# Patient Record
Sex: FEMALE | Race: WHITE | Employment: OTHER | URBAN - METROPOLITAN AREA
[De-identification: names, ages, dates, MRNs, and addresses within clinical notes are randomized per-mention and may not be internally consistent; named-entity substitution may affect disease eponyms.]

---

## 2017-05-09 PROBLEM — Z01.419 WELL WOMAN EXAM WITH ROUTINE GYNECOLOGICAL EXAM: Status: ACTIVE | Noted: 2017-05-09

## 2017-05-09 PROCEDURE — 81001 URINALYSIS AUTO W/SCOPE: CPT | Performed by: INTERNAL MEDICINE

## 2018-01-30 PROBLEM — Z87.442 HISTORY OF RENAL CALCULI: Status: ACTIVE | Noted: 2018-01-30

## 2018-01-30 PROBLEM — R31.9 HEMATURIA, UNSPECIFIED TYPE: Status: ACTIVE | Noted: 2018-01-30

## 2018-01-30 PROCEDURE — 81001 URINALYSIS AUTO W/SCOPE: CPT | Performed by: INTERNAL MEDICINE

## 2018-02-12 PROCEDURE — 87086 URINE CULTURE/COLONY COUNT: CPT | Performed by: INTERNAL MEDICINE

## 2018-06-25 PROBLEM — L30.9 DERMATITIS: Status: ACTIVE | Noted: 2018-06-25

## 2018-06-25 PROBLEM — Z00.00 ENCOUNTER FOR MEDICARE ANNUAL WELLNESS EXAM: Status: ACTIVE | Noted: 2018-06-25

## 2018-07-06 PROCEDURE — 81001 URINALYSIS AUTO W/SCOPE: CPT | Performed by: INTERNAL MEDICINE

## 2019-06-25 PROBLEM — Z00.00 ENCOUNTER FOR MEDICARE ANNUAL WELLNESS EXAM: Status: RESOLVED | Noted: 2018-06-25 | Resolved: 2019-06-25

## 2019-06-25 PROBLEM — R31.9 HEMATURIA, UNSPECIFIED TYPE: Status: RESOLVED | Noted: 2018-01-30 | Resolved: 2019-06-25

## 2019-06-25 PROBLEM — L30.9 DERMATITIS: Status: RESOLVED | Noted: 2018-06-25 | Resolved: 2019-06-25

## 2019-06-25 PROBLEM — R09.82 PND (POST-NASAL DRIP): Status: ACTIVE | Noted: 2019-06-25

## 2019-06-25 PROBLEM — J06.9 UPPER RESPIRATORY TRACT INFECTION, UNSPECIFIED TYPE: Status: ACTIVE | Noted: 2019-06-25

## 2019-06-25 PROCEDURE — 81001 URINALYSIS AUTO W/SCOPE: CPT | Performed by: INTERNAL MEDICINE

## 2020-01-23 PROBLEM — J06.9 UPPER RESPIRATORY TRACT INFECTION, UNSPECIFIED TYPE: Status: RESOLVED | Noted: 2019-06-25 | Resolved: 2020-01-23

## 2020-01-23 PROBLEM — Z78.0 POST-MENOPAUSE: Status: ACTIVE | Noted: 2020-01-23

## 2020-01-23 PROBLEM — R09.82 PND (POST-NASAL DRIP): Status: RESOLVED | Noted: 2019-06-25 | Resolved: 2020-01-23

## 2020-01-23 PROBLEM — E83.42 HYPOMAGNESEMIA: Status: ACTIVE | Noted: 2020-01-23

## 2020-06-05 PROBLEM — R73.9 HYPERGLYCEMIA: Status: ACTIVE | Noted: 2020-06-05

## 2020-06-05 PROBLEM — J22 LOWER RESP. TRACT INFECTION: Status: ACTIVE | Noted: 2020-06-05

## 2020-06-05 PROBLEM — R93.89 ABNORMAL CHEST CT: Status: ACTIVE | Noted: 2020-06-05

## 2020-06-05 PROBLEM — Z13.29 SCREENING FOR ENDOCRINE DISORDER: Status: ACTIVE | Noted: 2017-05-09

## 2020-06-13 PROBLEM — L98.9 SKIN DISORDER: Status: ACTIVE | Noted: 2020-06-13

## 2020-07-07 PROBLEM — I20.89 ANGINAL EQUIVALENT (HCC): Status: ACTIVE | Noted: 2020-07-07

## 2020-07-07 PROBLEM — E83.42 HYPOMAGNESEMIA: Status: RESOLVED | Noted: 2020-01-23 | Resolved: 2020-07-07

## 2020-07-07 PROBLEM — I20.89 ANGINAL EQUIVALENT: Status: ACTIVE | Noted: 2020-07-07

## 2020-07-07 PROBLEM — I20.8 ANGINAL EQUIVALENT (HCC): Status: ACTIVE | Noted: 2020-07-07

## 2020-07-07 PROBLEM — Z13.29 SCREENING FOR ENDOCRINE DISORDER: Status: RESOLVED | Noted: 2017-05-09 | Resolved: 2020-07-07

## 2020-07-07 PROBLEM — R73.01 IMPAIRED FASTING GLUCOSE: Status: ACTIVE | Noted: 2020-07-07

## 2020-07-07 PROBLEM — J22 LOWER RESP. TRACT INFECTION: Status: RESOLVED | Noted: 2020-06-05 | Resolved: 2020-07-07

## 2020-07-07 PROBLEM — R73.9 HYPERGLYCEMIA: Status: RESOLVED | Noted: 2020-06-05 | Resolved: 2020-07-07

## 2020-07-30 PROBLEM — I20.8 ANGINAL EQUIVALENT (HCC): Status: RESOLVED | Noted: 2020-07-07 | Resolved: 2020-07-30

## 2020-07-30 PROBLEM — Z01.818 PREOPERATIVE EXAMINATION: Status: ACTIVE | Noted: 2020-07-30

## 2020-07-30 PROBLEM — I20.89 ANGINAL EQUIVALENT (HCC): Status: RESOLVED | Noted: 2020-07-07 | Resolved: 2020-07-30

## 2020-07-30 PROBLEM — I20.89 ANGINAL EQUIVALENT: Status: RESOLVED | Noted: 2020-07-07 | Resolved: 2020-07-30

## 2021-01-05 PROBLEM — K58.9 IRRITABLE BOWEL SYNDROME, UNSPECIFIED TYPE: Status: ACTIVE | Noted: 2021-01-05

## 2021-02-11 PROCEDURE — 88305 TISSUE EXAM BY PATHOLOGIST: CPT | Performed by: INTERNAL MEDICINE

## 2021-05-05 PROBLEM — K58.8 OTHER IRRITABLE BOWEL SYNDROME: Status: ACTIVE | Noted: 2021-01-05

## 2021-09-21 PROBLEM — J06.9 UPPER RESPIRATORY TRACT INFECTION, UNSPECIFIED TYPE: Status: ACTIVE | Noted: 2021-09-21

## 2021-09-21 PROBLEM — Z01.419 WELL WOMAN EXAM WITH ROUTINE GYNECOLOGICAL EXAM: Status: ACTIVE | Noted: 2021-09-21

## 2022-01-19 PROBLEM — D89.89: Status: ACTIVE | Noted: 2022-01-19

## 2022-01-19 PROBLEM — L98.9 SKIN DISORDER: Status: RESOLVED | Noted: 2020-06-13 | Resolved: 2022-01-19

## 2022-01-19 PROBLEM — J06.9 UPPER RESPIRATORY TRACT INFECTION, UNSPECIFIED TYPE: Status: RESOLVED | Noted: 2021-09-21 | Resolved: 2022-01-19

## 2022-01-19 PROBLEM — J47.9 BRONCHIECTASIS WITHOUT COMPLICATION (HCC): Status: ACTIVE | Noted: 2022-01-19

## 2024-09-11 ENCOUNTER — APPOINTMENT (OUTPATIENT)
Dept: CT IMAGING | Age: 74
DRG: 381 | End: 2024-09-11
Attending: EMERGENCY MEDICINE
Payer: MEDICARE

## 2024-09-11 ENCOUNTER — HOSPITAL ENCOUNTER (INPATIENT)
Facility: HOSPITAL | Age: 74
LOS: 8 days | Discharge: HOME | End: 2024-09-19
Attending: EMERGENCY MEDICINE | Admitting: HOSPITALIST
Payer: MEDICARE

## 2024-09-11 ENCOUNTER — APPOINTMENT (OUTPATIENT)
Dept: CT IMAGING | Age: 74
End: 2024-09-11
Attending: EMERGENCY MEDICINE
Payer: MEDICARE

## 2024-09-11 ENCOUNTER — HOSPITAL ENCOUNTER (INPATIENT)
Facility: HOSPITAL | Age: 74
LOS: 8 days | Discharge: HOME | DRG: 381 | End: 2024-09-19
Attending: EMERGENCY MEDICINE | Admitting: HOSPITALIST
Payer: MEDICARE

## 2024-09-11 DIAGNOSIS — K25.1 ACUTE GASTRIC ULCER WITH PERFORATION (HCC): Primary | ICD-10-CM

## 2024-09-11 LAB
ALBUMIN SERPL-MCNC: 3.4 G/DL (ref 3.4–5)
ALBUMIN/GLOB SERPL: 0.7 {RATIO} (ref 1–2)
ALP LIVER SERPL-CCNC: 64 U/L
ALT SERPL-CCNC: 22 U/L
ANION GAP SERPL CALC-SCNC: 7 MMOL/L (ref 0–18)
AST SERPL-CCNC: 19 U/L (ref 15–37)
BASOPHILS # BLD AUTO: 0.04 X10(3) UL (ref 0–0.2)
BASOPHILS NFR BLD AUTO: 0.3 %
BILIRUB SERPL-MCNC: 0.8 MG/DL (ref 0.1–2)
BILIRUB UR QL CFM: POSITIVE
BUN BLD-MCNC: 37 MG/DL (ref 9–23)
CALCIUM BLD-MCNC: 9.7 MG/DL (ref 8.5–10.1)
CHLORIDE SERPL-SCNC: 96 MMOL/L (ref 98–112)
CLARITY UR REFRACT.AUTO: CLEAR
CO2 SERPL-SCNC: 29 MMOL/L (ref 21–32)
CREAT BLD-MCNC: 1.16 MG/DL
EGFRCR SERPLBLD CKD-EPI 2021: 49 ML/MIN/1.73M2 (ref 60–?)
EOSINOPHIL # BLD AUTO: 0.14 X10(3) UL (ref 0–0.7)
EOSINOPHIL NFR BLD AUTO: 0.9 %
ERYTHROCYTE [DISTWIDTH] IN BLOOD BY AUTOMATED COUNT: 13.7 %
GLOBULIN PLAS-MCNC: 4.8 G/DL (ref 2.8–4.4)
GLUCOSE BLD-MCNC: 109 MG/DL (ref 70–99)
GLUCOSE UR STRIP.AUTO-MCNC: NEGATIVE MG/DL
HCT VFR BLD AUTO: 48.9 %
HGB BLD-MCNC: 16.4 G/DL
IMM GRANULOCYTES # BLD AUTO: 0.06 X10(3) UL (ref 0–1)
IMM GRANULOCYTES NFR BLD: 0.4 %
LIPASE SERPL-CCNC: 41 U/L (ref 12–53)
LYMPHOCYTES # BLD AUTO: 2.25 X10(3) UL (ref 1–4)
LYMPHOCYTES NFR BLD AUTO: 15.2 %
MCH RBC QN AUTO: 30 PG (ref 26–34)
MCHC RBC AUTO-ENTMCNC: 33.5 G/DL (ref 31–37)
MCV RBC AUTO: 89.6 FL
MONOCYTES # BLD AUTO: 1.11 X10(3) UL (ref 0.1–1)
MONOCYTES NFR BLD AUTO: 7.5 %
NEUTROPHILS # BLD AUTO: 11.19 X10 (3) UL (ref 1.5–7.7)
NEUTROPHILS # BLD AUTO: 11.19 X10(3) UL (ref 1.5–7.7)
NEUTROPHILS NFR BLD AUTO: 75.7 %
NITRITE UR QL STRIP.AUTO: POSITIVE
OSMOLALITY SERPL CALC.SUM OF ELEC: 283 MOSM/KG (ref 275–295)
PH UR STRIP.AUTO: 5.5 [PH] (ref 5–8)
PLATELET # BLD AUTO: 572 10(3)UL (ref 150–450)
POTASSIUM SERPL-SCNC: 3.4 MMOL/L (ref 3.5–5.1)
PROT SERPL-MCNC: 8.2 G/DL (ref 6.4–8.2)
RBC # BLD AUTO: 5.46 X10(6)UL
RBC UR QL AUTO: NEGATIVE
SODIUM SERPL-SCNC: 132 MMOL/L (ref 136–145)
SP GR UR STRIP.AUTO: 1.02 (ref 1–1.03)
UROBILINOGEN UR STRIP.AUTO-MCNC: 1 MG/DL
WBC # BLD AUTO: 14.8 X10(3) UL (ref 4–11)

## 2024-09-11 PROCEDURE — 99497 ADVNCD CARE PLAN 30 MIN: CPT | Performed by: HOSPITALIST

## 2024-09-11 PROCEDURE — 74177 CT ABD & PELVIS W/CONTRAST: CPT | Performed by: EMERGENCY MEDICINE

## 2024-09-11 PROCEDURE — 99223 1ST HOSP IP/OBS HIGH 75: CPT | Performed by: HOSPITALIST

## 2024-09-11 PROCEDURE — 99222 1ST HOSP IP/OBS MODERATE 55: CPT | Performed by: STUDENT IN AN ORGANIZED HEALTH CARE EDUCATION/TRAINING PROGRAM

## 2024-09-11 RX ORDER — HYDROMORPHONE HYDROCHLORIDE 1 MG/ML
0.2 INJECTION, SOLUTION INTRAMUSCULAR; INTRAVENOUS; SUBCUTANEOUS EVERY 2 HOUR PRN
Status: DISCONTINUED | OUTPATIENT
Start: 2024-09-11 | End: 2024-09-19

## 2024-09-11 RX ORDER — METOCLOPRAMIDE HYDROCHLORIDE 5 MG/ML
5 INJECTION INTRAMUSCULAR; INTRAVENOUS EVERY 8 HOURS PRN
Status: DISCONTINUED | OUTPATIENT
Start: 2024-09-11 | End: 2024-09-19

## 2024-09-11 RX ORDER — LISINOPRIL 10 MG/1
10 TABLET ORAL DAILY
COMMUNITY

## 2024-09-11 RX ORDER — HYDROMORPHONE HYDROCHLORIDE 1 MG/ML
0.1 INJECTION, SOLUTION INTRAMUSCULAR; INTRAVENOUS; SUBCUTANEOUS EVERY 2 HOUR PRN
Status: DISCONTINUED | OUTPATIENT
Start: 2024-09-11 | End: 2024-09-19

## 2024-09-11 RX ORDER — HYDRALAZINE HYDROCHLORIDE 20 MG/ML
10 INJECTION INTRAMUSCULAR; INTRAVENOUS EVERY 6 HOURS PRN
Status: DISCONTINUED | OUTPATIENT
Start: 2024-09-11 | End: 2024-09-19

## 2024-09-11 RX ORDER — SODIUM CHLORIDE 9 MG/ML
INJECTION, SOLUTION INTRAVENOUS CONTINUOUS
Status: DISCONTINUED | OUTPATIENT
Start: 2024-09-11 | End: 2024-09-18

## 2024-09-11 RX ORDER — ONDANSETRON 2 MG/ML
4 INJECTION INTRAMUSCULAR; INTRAVENOUS ONCE
Status: COMPLETED | OUTPATIENT
Start: 2024-09-11 | End: 2024-09-11

## 2024-09-11 RX ORDER — MELATONIN
3 NIGHTLY PRN
Status: DISCONTINUED | OUTPATIENT
Start: 2024-09-11 | End: 2024-09-19

## 2024-09-11 RX ORDER — HEPARIN SODIUM 5000 [USP'U]/ML
5000 INJECTION, SOLUTION INTRAVENOUS; SUBCUTANEOUS EVERY 12 HOURS SCHEDULED
Status: DISCONTINUED | OUTPATIENT
Start: 2024-09-11 | End: 2024-09-19

## 2024-09-11 RX ORDER — ACETAMINOPHEN 500 MG
500 TABLET ORAL EVERY 4 HOURS PRN
Status: DISCONTINUED | OUTPATIENT
Start: 2024-09-11 | End: 2024-09-19

## 2024-09-11 RX ORDER — SODIUM PHOSPHATE, DIBASIC AND SODIUM PHOSPHATE, MONOBASIC 7; 19 G/230ML; G/230ML
1 ENEMA RECTAL ONCE AS NEEDED
Status: DISCONTINUED | OUTPATIENT
Start: 2024-09-11 | End: 2024-09-19

## 2024-09-11 RX ORDER — SODIUM CHLORIDE 9 MG/ML
INJECTION, SOLUTION INTRAVENOUS ONCE
Status: DISCONTINUED | OUTPATIENT
Start: 2024-09-11 | End: 2024-09-14

## 2024-09-11 RX ORDER — BISACODYL 10 MG
10 SUPPOSITORY, RECTAL RECTAL
Status: DISCONTINUED | OUTPATIENT
Start: 2024-09-11 | End: 2024-09-14

## 2024-09-11 RX ORDER — SODIUM CHLORIDE 9 MG/ML
INJECTION, SOLUTION INTRAVENOUS CONTINUOUS
Status: DISCONTINUED | OUTPATIENT
Start: 2024-09-11 | End: 2024-09-13

## 2024-09-11 RX ORDER — HYDROMORPHONE HYDROCHLORIDE 1 MG/ML
0.4 INJECTION, SOLUTION INTRAMUSCULAR; INTRAVENOUS; SUBCUTANEOUS EVERY 2 HOUR PRN
Status: DISCONTINUED | OUTPATIENT
Start: 2024-09-11 | End: 2024-09-19

## 2024-09-11 RX ORDER — METRONIDAZOLE 500 MG/100ML
500 INJECTION, SOLUTION INTRAVENOUS ONCE
Status: COMPLETED | OUTPATIENT
Start: 2024-09-11 | End: 2024-09-11

## 2024-09-11 RX ORDER — SENNOSIDES 8.6 MG
17.2 TABLET ORAL NIGHTLY PRN
Status: DISCONTINUED | OUTPATIENT
Start: 2024-09-11 | End: 2024-09-19

## 2024-09-11 RX ORDER — ALBUTEROL SULFATE 90 UG/1
2 INHALANT RESPIRATORY (INHALATION) 4 TIMES DAILY
Status: DISCONTINUED | OUTPATIENT
Start: 2024-09-11 | End: 2024-09-19

## 2024-09-11 RX ORDER — POLYETHYLENE GLYCOL 3350 17 G/17G
17 POWDER, FOR SOLUTION ORAL DAILY PRN
Status: DISCONTINUED | OUTPATIENT
Start: 2024-09-11 | End: 2024-09-19

## 2024-09-11 RX ORDER — ONDANSETRON 2 MG/ML
4 INJECTION INTRAMUSCULAR; INTRAVENOUS EVERY 6 HOURS PRN
Status: DISCONTINUED | OUTPATIENT
Start: 2024-09-11 | End: 2024-09-19

## 2024-09-11 NOTE — ED INITIAL ASSESSMENT (HPI)
States intermittent abd pain x1 month with vomiting states this episode started Monday - denies diarrhea

## 2024-09-11 NOTE — ED QUICK NOTES
Orders for admission, patient is aware of plan and ready to go upstairs. Any questions, please call ED RN merly at extension [phone].     Patient Covid vaccination status: Fully vaccinated     COVID Test Ordered in ED: None    COVID Suspicion at Admission: N/A    Running Infusions:    sodium chloride 125 mL (09/11/24 1622)        Mental Status/LOC at time of transport: A&Ox3    Other pertinent information:   CIWA score: N/A   NIH score:  N/A

## 2024-09-11 NOTE — H&P
[facility] [facility] [facility]  History and Physical     Yudy Lama Patient Status:  Emergency    1950 MRN [id number]   Location [facility] [facility] Attending Aj Soria MD   Hosp Day # 0 PCP Robert Kraus MD     Chief Complaint: \"Abdominal pain for a month with associated emesis\"    Subjective:    History of Present Illness:     Yudy Lama is a 74 year old female with PMHx essential hypertension, hyperlipidemia, GERD presents to hospital today with abdominal pain for about a month. Pt first noticed some intermittent abd x1 month. Had some emesis over last 24h. No fevers or chills. Pt decided to come in for further evaluation.     -S/p IV ceftriaxone, IV metronidazole, IV Zofran, IV Protonix, IV fluid bolus, general surgery consulted in the emergency    History/Other:    Past Medical History:  Past Medical History:    Atherosclerosis of coronary artery    Description: mild, medical therapy.     Atrophic vaginitis    Bundle branch block    Calculus of gallbladder without cholecystitis without obstruction    Chronic venous insufficiency    Cyst of ovary    Diverticulosis of colon    Left side    Eczema    Gastroesophageal reflux disease with esophagitis    Hx of colonic polyps    Hyperlipidemia, mixed    Hypertension, essential    Mild intermittent asthma without complication (HCC)    Primary generalized (osteo)arthritis    Ventral hernia without obstruction or gangrene    Repaired 2020     Past Surgical History:   Past Surgical History:   Procedure Laterality Date      1980    Colonoscopy  2015    Diverticulosis    Egd N/A 2021    Procedure: ESOPHAGOGASTRODUODENOSCOPY, COLONOSCOPY, POSSIBLE BIOPSY, POSSIBLE POLYPECTOMY 01127, 25083;  Surgeon: Sampson Damon MD;  Location: [facility]    Inguinal hernia repair  1968    Lithotripsy      Ventral hernia repair  2020    1.7 inch Ventralex ST hernia patch      Family History:   Family  History   Problem Relation Age of Onset    Heart Surgery Mother 63    Other (Other) Mother          at 75y/o;  AAA surgery.    Breast Cancer Maternal Grandmother 87        87    Heart Surgery Brother         Coronary stents in 40's; CABG in 50's    Other (Other) Brother         AAA repair     Social History:    reports that she has quit smoking. She has never used smokeless tobacco. She reports current alcohol use. She reports that she does not use drugs.     Allergies: No Known Allergies    Medications:    No current facility-administered medications on file prior to encounter.     Current Outpatient Medications on File Prior to Encounter   Medication Sig Dispense Refill    lisinopril 10 MG Oral Tab Take 1 tablet (10 mg total) by mouth daily.      albuterol (2.5 MG/3ML) 0.083% Inhalation Nebu Soln Take 3 mL (2.5 mg total) by nebulization every 6 (six) hours as needed for Wheezing. 1 each 5    chlorthalidone 25 MG Oral Tab Take 1 tablet (25 mg total) by mouth daily. 90 tablet 1    pantoprazole 40 MG Oral Tab EC Take 1 tablet (40 mg total) by mouth daily as needed. 90 tablet 1    rosuvastatin 5 MG Oral Tab Take 1 tablet (5 mg total) by mouth nightly. 90 tablet 1    fenofibrate 145 MG Oral Tab Take 1 tablet (145 mg total) by mouth once daily. 90 tablet 1    ADVAIR -21 MCG/ACT Inhalation Aerosol TAKE 2 PUFFS BY MOUTH TWICE A DAY **$492 3 each 2    Fluticasone-Salmeterol 113-14 MCG/ACT Inhalation Aerosol Powder, Breath Activated Inhale 1 puff into the lungs 2 (two) times daily. 3 each 3    Albuterol Sulfate HFA (VENTOLIN HFA) 108 (90 Base) MCG/ACT Inhalation Aero Soln Inhale 2 puffs into the lungs every 6 (six) hours as needed for Wheezing. 18 g 3    DICYCLOMINE HCL 10 MG Oral Cap TAKE 1 CAPSULE (10 MG TOTAL) BY MOUTH 4 (FOUR) TIMES DAILY BEFORE MEALS AND NIGHTLY. 120 capsule 2    Clobetasol Propionate 0.05 % External Ointment Apply twice daily to affected areas, reduce to using once, daily as the areas begin  to resolve. 60 g 5    OYSCO 500 500 MG Oral Tab TAKE 1 TABLET BY MOUTH 3 TIMES A DAY 90 tablet 0    Magnesium Oxide 140 MG Oral Cap 500 MG PO Q DAY 28 capsule 0    silver sulfADIAZINE 1 % External Cream Apply 1 Application topically 3 (three) times daily. 50 g 0    aspirin 325 MG Oral Tab Take 1 tablet (325 mg total) by mouth daily.      Respiratory Therapy Supplies (NEBULIZER) Does not apply Device One nebulizer 1 each 0    Quinapril HCl 10 MG Oral Tab Take 1 tablet (10 mg total) by mouth daily. (Patient not taking: Reported on 9/11/2024) 90 tablet 1    FLORASTOR 250 MG Oral Cap As directed (Patient not taking: Reported on 9/11/2024) 1 capsule 0       Review of Systems:   A comprehensive review of systems was completed.    Pertinent positives and negatives noted in the HPI.    Objective:   Physical Exam:    BP 98/48 (BP Location: Left arm)   Pulse 84   Temp 98.1 °F (36.7 °C) (Oral)   Resp 16   Ht 5' 5\" (1.651 m)   Wt 157 lb (71.2 kg)   LMP 01/01/1995   SpO2 100%   BMI 26.13 kg/m²   General: No acute distress, Alert  Respiratory: No rhonchi, no wheezes  Cardiovascular: S1, S2. Regular rate and rhythm  Abdomen: Soft, epigastric tender, non-distended, positive bowel sounds  Neuro: No new focal deficits  Extremities: No edema      Results:    Labs:      Labs Last 24 Hours:    Recent Labs   Lab 09/11/24  1355   RBC 5.46*   HGB 16.4*   HCT 48.9*   MCV 89.6   MCH 30.0   MCHC 33.5   RDW 13.7   NEPRELIM 11.19*   WBC 14.8*   .0*       Recent Labs   Lab 09/11/24  1355   *   BUN 37*   CREATSERUM 1.16*   EGFRCR 49*   CA 9.7   ALB 3.4   *   K 3.4*   CL 96*   CO2 29.0   ALKPHO 64   AST 19   ALT 22   BILT 0.8   TP 8.2       Lab Results   Component Value Date    INR 1.0 07/30/2020    INR 1 12/14/2011       No results for input(s): \"TROP\", \"TROPHS\", \"CK\" in the last 168 hours.    No results for input(s): \"TROP\", \"PBNP\" in the last 168 hours.    No results for input(s): \"PCT\" in the last 168  hours.    Imaging: Imaging data reviewed in Epic.    Assessment & Plan:      #Perforated gastric ulcer  #Acute vs chronic cholecystitis  -gen sx consulted from ER  -iv zosyn (9/11-)  -iv PPI BID  -pain meds, ivf    #Hyponatremia  #Acute kidney injury  #Dehydration  -ivf, repeat labs    #Pyuira, asymptomatic   -Ucx pending    #Thrombocytosis  -2/2 above    #Hypokalemia  -Replace and monitor     #CAD    #Essential hypertension  -PTA: Lisinopril  -PRN hydralazine for now    #Hyperlipidemia  -PTA: Rosuvastatin, fenofibrate    #GERD  -PTA: PPI    #Mild intermittent asthma  -prn albuterol     #ACP  -full code, 17 mins spent face to face w/ patient. We reviewed the meaning of cardiac arrest and the use of acls/cpr. This was a voluntary conversation. All questions were answered. Order updated on epic.         Plan of care discussed with er physician & patient / patient's  at the bedside    Sharonda Nunez DO    Supplementary Documentation:     The 21st Century Cures Act makes medical notes like these available to patients in the interest of transparency. Please be advised this is a medical document. Medical documents are intended to carry relevant information, facts as evident, and the clinical opinion of the practitioner. The medical note is intended as peer to peer communication and may appear blunt or direct. It is written in medical language and may contain abbreviations or verbiage that are unfamiliar.

## 2024-09-11 NOTE — ED PROVIDER NOTES
Patient Seen in: [facility] In [place]      History     Chief Complaint   Patient presents with    Abdomen/Flank Pain    Nausea/Vomiting/Diarrhea     Stated Complaint: states since Monday has had abd pain and vomiting, no diarrhea, no fever, sent *    Subjective:   HPI    74-year-old female with a past medical history as below including hypertension, hyperlipidemia, GERD, CAD and mild asthma presents with abdominal pain and vomiting that started 2 days ago.  Patient states abdominal pain is mainly in the epigastric and periumbilical area.  She reports multiple episodes of nonbloody/gray-colored emesis Monday afternoon and evening.  Patient is vomiting has gotten less frequent since then with last episode last night.  She still feels nauseated.   states she has had very little to eat and drink since Monday.  Patient states she had a normal bowel movement Monday.  Denies diarrhea, rectal bleeding or melena.  She denies fever, chills, cough or cold symptoms.  Denies urinary symptoms.    Objective:   Past Medical History:    Atherosclerosis of coronary artery    Description: mild, medical therapy.     Atrophic vaginitis    Bundle branch block    Calculus of gallbladder without cholecystitis without obstruction    Chronic venous insufficiency    Cyst of ovary    Diverticulosis of colon    Left side    Eczema    Gastroesophageal reflux disease with esophagitis    Hx of colonic polyps    Hyperlipidemia, mixed    Hypertension, essential    Mild intermittent asthma without complication (HCC)    Primary generalized (osteo)arthritis    Ventral hernia without obstruction or gangrene    Repaired 2020              Past Surgical History:   Procedure Laterality Date      1980    Colonoscopy  2015    Diverticulosis    Egd N/A 2021    Procedure: ESOPHAGOGASTRODUODENOSCOPY, COLONOSCOPY, POSSIBLE BIOPSY, POSSIBLE POLYPECTOMY 27480, 79879;  Surgeon: Sampson Damon MD;  Location:  [facility]    Inguinal hernia repair  06/1968    Lithotripsy  2002    Ventral hernia repair  08/12/2020    1.7 inch Ventralex ST hernia patch                Social History     Socioeconomic History    Marital status:    Tobacco Use    Smoking status: Former    Smokeless tobacco: Never   Vaping Use    Vaping status: Never Used   Substance and Sexual Activity    Alcohol use: Yes     Alcohol/week: 0.0 standard drinks of alcohol     Comment: ocassionally    Drug use: No              Review of Systems    Positive for stated Chief Complaint: Abdomen/Flank Pain and Nausea/Vomiting/Diarrhea    Other systems are as noted in HPI.  Constitutional and vital signs reviewed.      All other systems reviewed and negative except as noted above.    Physical Exam     ED Triage Vitals [09/11/24 1222]   /65   Pulse 100   Resp 20   Temp 98 °F (36.7 °C)   Temp src Temporal   SpO2 96 %   O2 Device None (Room air)       Current Vitals:   Vital Signs  BP: 119/38  Pulse: 79  Resp: 16  Temp: 97.7 °F (36.5 °C)  Temp src: Oral    Oxygen Therapy  SpO2: 95 %  O2 Device: None (Room air)            Physical Exam  Vitals and nursing note reviewed.   Constitutional:       Appearance: She is well-developed.   HENT:      Head: Normocephalic and atraumatic.      Mouth/Throat:      Mouth: Mucous membranes are moist.   Eyes:      General: No scleral icterus.  Cardiovascular:      Rate and Rhythm: Normal rate and regular rhythm.   Pulmonary:      Effort: Pulmonary effort is normal.      Breath sounds: Normal breath sounds.   Abdominal:      General: Bowel sounds are normal. There is no distension.      Palpations: Abdomen is soft.      Tenderness: There is abdominal tenderness. There is no guarding or rebound.      Comments: Diffuse tenderness across upper abdomen greatest in the epigastrium  Soft reducible supraumbilical hernia   Skin:     General: Skin is warm and dry.   Neurological:      General: No focal deficit present.      Mental  Status: She is alert and oriented to person, place, and time.      Cranial Nerves: No cranial nerve deficit.      Motor: No weakness.   Psychiatric:         Mood and Affect: Mood normal.         Behavior: Behavior normal.               ED Course     Labs Reviewed   COMP METABOLIC PANEL (14) - Abnormal; Notable for the following components:       Result Value    Glucose 109 (*)     Sodium 132 (*)     Potassium 3.4 (*)     Chloride 96 (*)     BUN 37 (*)     Creatinine 1.16 (*)     eGFR-Cr 49 (*)     Globulin  4.8 (*)     A/G Ratio 0.7 (*)     All other components within normal limits   CBC WITH DIFFERENTIAL WITH PLATELET - Abnormal; Notable for the following components:    WBC 14.8 (*)     RBC 5.46 (*)     HGB 16.4 (*)     HCT 48.9 (*)     .0 (*)     Neutrophil Absolute Prelim 11.19 (*)     Neutrophil Absolute 11.19 (*)     Monocyte Absolute 1.11 (*)     All other components within normal limits   URINALYSIS WITH CULTURE REFLEX - Abnormal; Notable for the following components:    Urine Color Patrizia (*)     Ketones Urine Trace (*)     Protein Urine 30 mg/dL (*)     Urobilinogen Urine 1.0 (*)     Nitrite Urine Positive (*)     Leukocyte Esterase Urine Trace (*)     All other components within normal limits   UA MICROSCOPIC ONLY, URINE - Abnormal; Notable for the following components:    WBC Urine 6-10 (*)     Bacteria Urine Rare (*)     Squamous Epi. Cells Few (*)     All other components within normal limits   ICTOTEST - Abnormal; Notable for the following components:    Ictotest Positive (*)     All other components within normal limits   LIPASE - Normal   URINE CULTURE, ROUTINE             CT ABDOMEN+PELVIS(CONTRAST ONLY)(CPT=74177)    Result Date: 9/11/2024  CONCLUSION:  1. Findings concerning for perforated gastric ulcer with small amount of free air in the right upper quadrant. 2.  Mild gallbladder wall thickening with cholelithiasis may be seen with chronic versus acute cholecystitis.  This report was  communicated by telephone to Dr. Soria at the dictation time shown below.   LOCATION:  [facility]   Dictated by (CST): Vinnie Bhatia MD on 9/11/2024 at 3:57 PM     Finalized by (CST): Vinnie Bhatia MD on 9/11/2024 at 4:03 PM             A total of 55 minutes of critical care time (exclusive of billable procedures) was administered to manage the patient's critical imaging findings due to her perforated gastric ulcer.  This involved direct patient intervention, complex decision making, and/or extensive discussions with the patient, family, and clinical staff.    MDM      74-year-old female with a past medical history as below including hypertension, hyperlipidemia, GERD, CAD and mild asthma presents with abdominal pain and vomiting that started 2 days ago.      Differential includes but is not limited to gastritis, PUD, pancreatitis, cholecystitis, dehydration, electrolyte abnormality less likely bowel obstruction    Labs show elevated WBC 14.8.  Patient also has some mild hyponatremia and hypokalemia with slightly elevated creatinine.  LFTs and lipase are normal.    Independent interpretation CT abdomen/pelvis shows some free air in the right upper quadrant.  Discussed with radiologist states that findings are concerning for perforated gastric ulcer.    Discussed with general surgery Dr. Arthur recommends sending patient to preop.  Patient treated with Protonix, Rocephin and Flagyl.  Discussed with hospitalist Dr. Goodrich.        Discussed with general surgery Dr. Conley states after his reviewing the CT the perforation appears contained.  Recommends admission but patient will likely not need surgical intervention today.      Admission disposition: 9/11/2024  4:28 PM                                        Medical Decision Making  Amount and/or Complexity of Data Reviewed  Independent Historian: spouse     Details: See HPI  Labs: ordered. Decision-making details documented in ED Course.  Radiology: ordered and  independent interpretation performed. Decision-making details documented in ED Course.  Discussion of management or test interpretation with external provider(s): General Surgery, hospitalist, radiology    Risk  Decision regarding hospitalization.        Disposition and Plan     Clinical Impression:  1. Acute gastric ulcer with perforation (HCC)         Disposition:  Admit  9/11/2024  4:28 pm    Follow-up:  No follow-up provider specified.        Medications Prescribed:  Current Discharge Medication List                            Hospital Problems       Present on Admission  Date Reviewed: 3/10/2022            ICD-10-CM Noted POA    * (Principal) Acute gastric ulcer with perforation (HCC) K25.1 9/11/2024 Unknown

## 2024-09-12 LAB
ALBUMIN SERPL-MCNC: 3.4 G/DL (ref 3.2–4.8)
ALBUMIN/GLOB SERPL: 1.1 {RATIO} (ref 1–2)
ALP LIVER SERPL-CCNC: 49 U/L
ALT SERPL-CCNC: 10 U/L
ANION GAP SERPL CALC-SCNC: 6 MMOL/L (ref 0–18)
AST SERPL-CCNC: 19 U/L (ref ?–34)
BASOPHILS # BLD AUTO: 0.05 X10(3) UL (ref 0–0.2)
BASOPHILS NFR BLD AUTO: 0.6 %
BILIRUB SERPL-MCNC: 0.6 MG/DL (ref 0.2–1.1)
BUN BLD-MCNC: 23 MG/DL (ref 9–23)
CALCIUM BLD-MCNC: 8.9 MG/DL (ref 8.7–10.4)
CHLORIDE SERPL-SCNC: 105 MMOL/L (ref 98–112)
CO2 SERPL-SCNC: 29 MMOL/L (ref 21–32)
CREAT BLD-MCNC: 0.81 MG/DL
EGFRCR SERPLBLD CKD-EPI 2021: 75 ML/MIN/1.73M2 (ref 60–?)
EOSINOPHIL # BLD AUTO: 0.22 X10(3) UL (ref 0–0.7)
EOSINOPHIL NFR BLD AUTO: 2.5 %
ERYTHROCYTE [DISTWIDTH] IN BLOOD BY AUTOMATED COUNT: 13.7 %
GLOBULIN PLAS-MCNC: 3.1 G/DL (ref 2–3.5)
GLUCOSE BLD-MCNC: 86 MG/DL (ref 70–99)
HCT VFR BLD AUTO: 39.5 %
HGB BLD-MCNC: 12.8 G/DL
IMM GRANULOCYTES # BLD AUTO: 0.03 X10(3) UL (ref 0–1)
IMM GRANULOCYTES NFR BLD: 0.3 %
LYMPHOCYTES # BLD AUTO: 1.24 X10(3) UL (ref 1–4)
LYMPHOCYTES NFR BLD AUTO: 14.4 %
MAGNESIUM SERPL-MCNC: 1.8 MG/DL (ref 1.6–2.6)
MCH RBC QN AUTO: 29.7 PG (ref 26–34)
MCHC RBC AUTO-ENTMCNC: 32.4 G/DL (ref 31–37)
MCV RBC AUTO: 91.6 FL
MONOCYTES # BLD AUTO: 0.82 X10(3) UL (ref 0.1–1)
MONOCYTES NFR BLD AUTO: 9.5 %
NEUTROPHILS # BLD AUTO: 6.27 X10 (3) UL (ref 1.5–7.7)
NEUTROPHILS # BLD AUTO: 6.27 X10(3) UL (ref 1.5–7.7)
NEUTROPHILS NFR BLD AUTO: 72.7 %
OSMOLALITY SERPL CALC.SUM OF ELEC: 293 MOSM/KG (ref 275–295)
PLATELET # BLD AUTO: 371 10(3)UL (ref 150–450)
POTASSIUM SERPL-SCNC: 3.8 MMOL/L (ref 3.5–5.1)
PROT SERPL-MCNC: 6.5 G/DL (ref 5.7–8.2)
RBC # BLD AUTO: 4.31 X10(6)UL
SODIUM SERPL-SCNC: 140 MMOL/L (ref 136–145)
WBC # BLD AUTO: 8.6 X10(3) UL (ref 4–11)

## 2024-09-12 PROCEDURE — 99232 SBSQ HOSP IP/OBS MODERATE 35: CPT | Performed by: STUDENT IN AN ORGANIZED HEALTH CARE EDUCATION/TRAINING PROGRAM

## 2024-09-12 PROCEDURE — 99232 SBSQ HOSP IP/OBS MODERATE 35: CPT | Performed by: HOSPITALIST

## 2024-09-12 RX ORDER — MAGNESIUM SULFATE HEPTAHYDRATE 40 MG/ML
2 INJECTION, SOLUTION INTRAVENOUS ONCE
Status: COMPLETED | OUTPATIENT
Start: 2024-09-12 | End: 2024-09-12

## 2024-09-12 NOTE — CONSULTS
[facility]  Report of Surgical Consultation with History and Physical Exam    Yudy Lama Patient Status:  Inpatient    1950 MRN [id number]   Location [facility] 3NW-A Attending Walter Goodrich MD   Hosp Day # 0 PCP Robert Kraus MD     Reason for Surgical Consultation: Seeing this patient in consultation at the request of Dr. Soria for perforated gastric ulcer    History of Present Illness:  Yudy Lama is a a(n) 74 year old female who presented to [place] emergency room after seeing her primary care physician today.  Patient reports having some abdominal bloating which led to severe abdominal pain and vomiting starting Monday.  Eventually, the vomiting improved but patient followed up with her primary care to be evaluated.  Due to the concern for decreased p.o. intake and severe dehydration, patient was recommended to go to the emergency room.    In [place] emergency room, patient was hemodynamically stable.  She had a leukocytosis to 14 and a mild DEBORAH at creatinine 1.16.  Labs were otherwise normal.  CT imaging was performed and showed small bubbles of free air near the liver and thickening of the distal stomach concerning for perforated gastric ulcer.  General surgery was consulted for further evaluation and management.    On interview, patient reports having a similar episode approximately 1 month ago.  She states that she became bloated and had severe pain leading to nausea and vomiting.  She was out of town at this time and did not go to the hospital.  Eventually, her symptoms subsided.  She reports having a similar episode nearly 5 years ago where she was in severe pain.  She was out of town again during this episode.  She reports having an endoscopy at that time which was negative.  Since being admitted to the hospital, patient reports improvement of her abdominal pain as well as nausea and vomiting.    She reports having a  as well as a ventral hernia repair  with mesh as her only abdominal surgeries.      History:  Past Medical History:    Atherosclerosis of coronary artery    Description: mild, medical therapy.     Atrophic vaginitis    Bundle branch block    Calculus of gallbladder without cholecystitis without obstruction    Chronic venous insufficiency    Cyst of ovary    Diverticulosis of colon    Left side    Eczema    Gastroesophageal reflux disease with esophagitis    Hx of colonic polyps    Hyperlipidemia, mixed    Hypertension, essential    Mild intermittent asthma without complication (HCC)    Primary generalized (osteo)arthritis    Ventral hernia without obstruction or gangrene    Repaired 2020       Past Surgical History:   Procedure Laterality Date      1980    Colonoscopy  2015    Diverticulosis    Egd N/A 2021    Procedure: ESOPHAGOGASTRODUODENOSCOPY, COLONOSCOPY, POSSIBLE BIOPSY, POSSIBLE POLYPECTOMY 99368, 52203;  Surgeon: Sampson Damon MD;  Location: [facility]    Inguinal hernia repair  1968    Lithotripsy      Ventral hernia repair  2020    1.7 inch Ventralex ST hernia patch       Family History   Problem Relation Age of Onset    Heart Surgery Mother 63    Other (Other) Mother          at 75y/o;  AAA surgery.    Breast Cancer Maternal Grandmother 87        87    Heart Surgery Brother         Coronary stents in 40's; CABG in 50's    Other (Other) Brother         AAA repair        reports that she has quit smoking. She has never used smokeless tobacco. She reports current alcohol use. She reports that she does not use drugs.      Allergies:  No Known Allergies      Medications:    Current Facility-Administered Medications:     sodium chloride 0.9% infusion, , Intravenous, Continuous    sodium chloride 0.9% infusion, , Intravenous, Once    piperacillin-tazobactam (Zosyn) 3.375 g in dextrose 5% 100 mL IVPB-ADDV, 3.375 g, Intravenous, Q8H    pantoprazole (Protonix) 40 mg in sodium chloride 0.9% PF 10 mL  IV push, 40 mg, Intravenous, Q12H    HYDROmorphone (Dilaudid) 1 MG/ML injection 0.1 mg, 0.1 mg, Intravenous, Q2H PRN **OR** HYDROmorphone (Dilaudid) 1 MG/ML injection 0.2 mg, 0.2 mg, Intravenous, Q2H PRN **OR** HYDROmorphone (Dilaudid) 1 MG/ML injection 0.4 mg, 0.4 mg, Intravenous, Q2H PRN    sodium chloride 0.9% infusion, , Intravenous, Continuous    hydrALAzine (Apresoline) 20 mg/mL injection 10 mg, 10 mg, Intravenous, Q6H PRN    albuterol (Ventolin HFA) 108 (90 Base) MCG/ACT inhaler 2 puff, 2 puff, Inhalation, QID    acetaminophen (Tylenol Extra Strength) tab 500 mg, 500 mg, Oral, Q4H PRN    melatonin tab 3 mg, 3 mg, Oral, Nightly PRN    ondansetron (Zofran) 4 MG/2ML injection 4 mg, 4 mg, Intravenous, Q6H PRN    metoclopramide (Reglan) 5 mg/mL injection 5 mg, 5 mg, Intravenous, Q8H PRN    heparin (Porcine) 5000 UNIT/ML injection 5,000 Units, 5,000 Units, Subcutaneous, 2 times per day    polyethylene glycol (PEG 3350) (Miralax) 17 g oral packet 17 g, 17 g, Oral, Daily PRN    sennosides (Senokot) tab 17.2 mg, 17.2 mg, Oral, Nightly PRN    bisacodyl (Dulcolax) 10 MG rectal suppository 10 mg, 10 mg, Rectal, Daily PRN    fleet enema (Fleet) rectal enema 133 mL, 1 enema, Rectal, Once PRN      Review of Systems:  The review of systems was negative other than the above listed HPI and past medical history including the HEENT, Heart, Lungs, GI, , Neuro, Musculoskeletal, Hematologic, Endocrine and Psych.       Physical Exam:  Blood pressure 94/41, pulse 76, temperature 98.1 °F (36.7 °C), temperature source Oral, resp. rate 18, height 65\", weight 157 lb (71.2 kg), last menstrual period 01/01/1995, SpO2 100%, not currently breastfeeding.  General: Alert, orientated x3.  Cooperative.  No apparent distress.  HEENT: Exam is unremarkable.  Without scleral icterus.  Mucous membranes are moist. EOM are WNL.  Neck: No tenderness to palpitation.  Full range of motion to flexion and extension, lateral rotation and lateral flexion of  cervical spine.  No JVD. Supple.   Lungs: Bilateral chest rise. Good excursion of the diaphragms. No secondary use of accessory respiratory musculature.  Cardiac: Regular rate and rhythm. No murmur.  Abdomen: Soft, tender to palpation but no peritoneal or rebound, nondistended, reducible ventral hernia appreciated just superior to her umbilicus  Extremities:  No lower extremity edema noted.  Without clubbing or cyanosis.  2+ pulses x4  Skin: Normal texture and turgor.      Laboratory Data and Relevant Imaging:  Recent Labs   Lab 09/11/24  1355   RBC 5.46*   HGB 16.4*   HCT 48.9*   MCV 89.6   MCH 30.0   MCHC 33.5   RDW 13.7   NEPRELIM 11.19*   WBC 14.8*   .0*       Recent Labs   Lab 09/11/24  1355   *   BUN 37*   CREATSERUM 1.16*   CA 9.7   ALB 3.4   *   K 3.4*   CL 96*   CO2 29.0   ALKPHO 64   AST 19   ALT 22   BILT 0.8   TP 8.2         No results for input(s): \"PTP\", \"INR\", \"PTT\" in the last 168 hours.    Imaging  CT ABDOMEN+PELVIS(CONTRAST ONLY)(CPT=74177)    Result Date: 9/11/2024  CONCLUSION:  1. Findings concerning for perforated gastric ulcer with small amount of free air in the right upper quadrant. 2.  Mild gallbladder wall thickening with cholelithiasis may be seen with chronic versus acute cholecystitis.  This report was communicated by telephone to Dr. Soria at the dictation time shown below.   LOCATION:  [facility]   Dictated by (CST): Vinnie Bhatia MD on 9/11/2024 at 3:57 PM     Finalized by (CST): Vinnie Bhatia MD on 9/11/2024 at 4:03 PM          Impression/Plan:  Patient Active Problem List   Diagnosis    Essential hypertension    Mixed hyperlipidemia    Other hemorrhoids    Mild intermittent asthma without complication (HCC)    Colon cancer screening    Eczema    Medicare annual wellness visit, subsequent    Visit for screening mammogram    Fatty liver    Asymptomatic varicose veins of unspecified lower extremity    Calculus of gallbladder without cholecystitis without obstruction     Atherosclerosis of coronary artery    Gastroesophageal reflux disease without esophagitis    Benign hematuria    Atrophic vaginitis    Primary generalized (osteo)arthritis    Irreducible umbilical hernia    Chronic venous insufficiency    History of renal calculi    Post-menopause    Abnormal chest CT    Impaired fasting glucose    Diverticulosis of colon    Hx of colonic polyps    Other irritable bowel syndrome    Well woman exam with routine gynecological exam    Kappa light chain deposition disorder (HCC)    Bronchiectasis without complication (HCC)    Acute gastric ulcer with perforation (HCC)       74 year old female with contained perforation of gastric ulcer    CT images were reviewed personally by me  CT shows potentially some fluid collection near the anterior wall of the distal stomach/pylorus with small bubbles of free air noted around the liver  There is no gross pneumoperitoneum concerning for free perforation  No acute surgical intervention at this time  Recommend trial of conservative management as patient may have sealed her perforation  Recommend NG tube placement for gastric decompression  IV fluids for hydration  IV antibiotics  Monitor for leukocytosis and fevers  If patient continues to show signs of infection, she may need IR drainage of fluid collection on imaging    Surgery will continue to follow  Rest of care per primary    Thank you for letting me participate in the care of this patient    Selin Conley MD  9/11/2024  9:33 PM

## 2024-09-12 NOTE — PLAN OF CARE
Upon assessment pt A&Ox4.  Bps soft but remain stable.  Other VS wdl.  Nsr on tele.  Denies chest pain, sob.  Nausea noted after NG placement, improved w/antiemetic.  Abdomen soft, round, tender.  Passing gas. NG secured at 55cm, bile and now clear output. Voids freely in adequate amounts.  2 rn skin check performed; flattened red patches scattered throughout body.  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 Patches are intact, not open.  PT npo. Ivf and abx running per order.  Pt updated on poc. Call light within reach.

## 2024-09-12 NOTE — PROGRESS NOTES
[facility]  General Surgery Progress Note    Yudy SEBAS Kelby Patient Status:  Inpatient    1950 MRN [id number]   Location [facility] 3NW-A Attending Bradly Fuentes MD   Hosp Day # 1 PCP Robert Kraus MD     Subjective:  NG tube was placed with 300 cc out.  Patient reports some worsening of her abdominal pain and reports back pain today.  She still feels bloated.  She remains afebrile.  Blood pressure was soft in the high 90s overnight but is now in the 100s.    Objective/Physical Exam:      Intake/Output Summary (Last 24 hours) at 2024 0823  Last data filed at 2024 0807  Gross per 24 hour   Intake 907 ml   Output 800 ml   Net 107 ml       Vital Signs:  Blood pressure 101/48, pulse 60, temperature 97.9 °F (36.6 °C), temperature source Oral, resp. rate 16, height 65\", weight 157 lb (71.2 kg), last menstrual period 1995, SpO2 100%, not currently breastfeeding.    General: Alert, orientated x3.  Cooperative.  No apparent distress.  HEENT: Exam is unremarkable.  Without scleral icterus.  Mucous membranes are moist. Oropharynx is clear.  NG tube in place with clear output  Neck: No JVD. Supple.   Lungs: Non labored breathing, equal chest rise  Cardiac: Regular rate and rhythm. No murmur.  Abdomen:  Soft, non-distended, tender to palpation but no rebound or peritoneal signs  Extremities:  No lower extremity edema noted.  Without clubbing or cyanosis.  2+ pulses x4, motor and sensation grossly intact      Labs:  Lab Results   Component Value Date    WBC 8.6 2024    RBC 4.31 2024    HGB 12.8 2024    HCT 39.5 2024    MCV 91.6 2024    MCH 29.7 2024    MCHC 32.4 2024    RDW 13.7 2024    .0 2024     Lab Results   Component Value Date     2024    K 3.8 2024     2024    CO2 29.0 2024    BUN 23 2024    CREATSERUM 0.81 2024    GLU 86 2024    CA 8.9 2024    ALKPHO 49 2024     ALT 10 09/12/2024    AST 19 09/12/2024    BILT 0.6 09/12/2024    ALB 3.4 09/12/2024    TP 6.5 09/12/2024     Lab Results   Component Value Date    MG 1.8 09/12/2024       Images:  CT ABDOMEN+PELVIS(CONTRAST ONLY)(CPT=74177)    Result Date: 9/11/2024  CONCLUSION:  1. Findings concerning for perforated gastric ulcer with small amount of free air in the right upper quadrant. 2.  Mild gallbladder wall thickening with cholelithiasis may be seen with chronic versus acute cholecystitis.  This report was communicated by telephone to Dr. Soria at the dictation time shown below.   LOCATION:  [facility]   Dictated by (CST): Vinnie Bhatia MD on 9/11/2024 at 3:57 PM     Finalized by (CST): Vinnie Bhatia MD on 9/11/2024 at 4:03 PM        Assessment/Plan:  Patient Active Problem List   Diagnosis    Essential hypertension    Mixed hyperlipidemia    Other hemorrhoids    Mild intermittent asthma without complication (HCC)    Colon cancer screening    Eczema    Medicare annual wellness visit, subsequent    Visit for screening mammogram    Fatty liver    Asymptomatic varicose veins of unspecified lower extremity    Calculus of gallbladder without cholecystitis without obstruction    Atherosclerosis of coronary artery    Gastroesophageal reflux disease without esophagitis    Benign hematuria    Atrophic vaginitis    Primary generalized (osteo)arthritis    Irreducible umbilical hernia    Chronic venous insufficiency    History of renal calculi    Post-menopause    Abnormal chest CT    Impaired fasting glucose    Diverticulosis of colon    Hx of colonic polyps    Other irritable bowel syndrome    Well woman exam with routine gynecological exam    Kappa light chain deposition disorder (HCC)    Bronchiectasis without complication (HCC)    Acute gastric ulcer with perforation (HCC)       74 year old female with contained gastric ulcer perforation    The patient remains afebrile  Patient did have soft blood pressures in the high 90s but this  is up to the 100s this morning  Leukocytosis has resolved  On physical exam, patient remains tender but is not peritonitic  NG tube has been placed to decompress her stomach  No acute surgical invention at this time  Will continue conservative management with NG tube decompression  Serial abdominal exams  I discussed with the patient that she may need surgical intervention especially if she begins to decompensate with her hemodynamics or have worsening abdominal pain  Continue IV antibiotics  Surgery will continue to follow  Rest of care per primary    Selin Conley MD  [facility]  9/12/2024  8:23 AM

## 2024-09-12 NOTE — PROGRESS NOTES
A&Ox4. VSS. RA.   Telemetry: NSR  GI: Abdomen soft, nondistended. Passing gas.  Nausea controlled with PRN zofran  : Voids.  Pain controlled with PRN pain medications  Up with standby assist.  Drains: NGT to lis in R nare at 55cm  Diet: NPO  IVF running per order. IV zosyn  All appropriate safety measures in place. Patient updated on plan of care. All questions and concerns addressed.

## 2024-09-12 NOTE — PROGRESS NOTES
[facility]   part of [facility]     Hospitalist Progress Note     Yudy Lama Patient Status:  Inpatient    1950 MRN [id number]   Location [facility] 3NW-A Attending Bradly Fuentes MD   Hosp Day # 1 PCP Robert Kraus MD     Chief Complaint: abd pain    Subjective:     Patient w/ epigastric pain but improving. No RUQ pain     Objective:    Review of Systems:   A comprehensive review of systems was completed; pertinent positive and negatives stated in subjective.    Vital signs:  Temp:  [97.6 °F (36.4 °C)-98.2 °F (36.8 °C)] 97.9 °F (36.6 °C)  Pulse:  [60-84] 70  Resp:  [16-18] 16  BP: ()/(41-56) 111/45  SpO2:  [100 %] 100 %    Physical Exam:    General: No acute distress  Respiratory: No wheezes, no rhonchi  Cardiovascular: S1, S2, regular rate and rhythm  Abdomen: Soft, epigastric-tender, non-distended, positive bowel sounds  Neuro: No new focal deficits.   Extremities: No edema      Diagnostic Data:    Labs:  Recent Labs   Lab 24  1355 24  0525   WBC 14.8* 8.6   HGB 16.4* 12.8   MCV 89.6 91.6   .0* 371.0       Recent Labs   Lab 24  1355 24  0525   * 86   BUN 37* 23   CREATSERUM 1.16* 0.81   CA 9.7 8.9   ALB 3.4 3.4   * 140   K 3.4* 3.8   CL 96* 105   CO2 29.0 29.0   ALKPHO 64 49*   AST 19 19   ALT 22 10   BILT 0.8 0.6   TP 8.2 6.5       Estimated Creatinine Clearance: 53.2 mL/min (based on SCr of 0.81 mg/dL).    No results for input(s): \"TROP\", \"TROPHS\", \"CK\" in the last 168 hours.    No results for input(s): \"PTP\", \"INR\" in the last 168 hours.               Microbiology    Hospital Encounter on 24   1. Urine Culture, Routine     Status: None (Preliminary result)    Collection Time: 24  3:15 PM    Specimen: Urine, clean catch   Result Value Ref Range    Urine Culture No Growth at <18 hours N/A         Imaging: Reviewed in Epic.    Medications:    sodium chloride   Intravenous Once    piperacillin-tazobactam  3.375 g  Intravenous Q8H    pantoprazole  40 mg Intravenous Q12H    albuterol  2 puff Inhalation QID    heparin  5,000 Units Subcutaneous 2 times per day       Assessment & Plan:      #Perforated gastric ulcer  #Acute vs chronic cholecystitis - no RUQ pain, likely chronic  -gen sx consulted from ER  -iv zosyn (9/11-)  -iv PPI BID  -pain meds, ivf  -no urgent surgery  -Ngt to decompress     #Hyponatremia  #Acute kidney injury  #Dehydration  -ivf, repeat labs     #Pyuira, asymptomatic   -Ucx no growth. No abx needed     #Thrombocytosis  -2/2 above     #Hypokalemia  -Replace and monitor      #CAD     #Essential hypertension  -PTA: Lisinopril  -PRN hydralazine for now     #Hyperlipidemia  -PTA: Rosuvastatin, fenofibrate     #GERD  -PTA: PPI    #Mild intermittent asthma  -prn albuterol      #ACP  -full code, 17 mins spent face to face w/ patient. We reviewed the meaning of cardiac arrest and the use of acls/cpr. This was a voluntary conversation. All questions were answered. Order updated on epic.          Bradly Fuentes MD    Supplementary Documentation:     Quality:  DVT Mechanical Prophylaxis:   SCDs,    DVT Pharmacologic Prophylaxis   Medication    heparin (Porcine) 5000 UNIT/ML injection 5,000 Units                Code Status: Full Code  Rosales: No urinary catheter in place  Rosales Duration (in days):   Central line:    ROSA:     Discharge is dependent on: course  At this point Ms. Lama is expected to be discharge to: home    The 21st Century Cures Act makes medical notes like these available to patients in the interest of transparency. Please be advised this is a medical document. Medical documents are intended to carry relevant information, facts as evident, and the clinical opinion of the practitioner. The medical note is intended as peer to peer communication and may appear blunt or direct. It is written in medical language and may contain abbreviations or verbiage that are unfamiliar.               **Certification      PHYSICIAN Certification of Need for Inpatient Hospitalization - Initial Certification    Patient will require inpatient services that will reasonably be expected to span two midnight's based on the clinical documentation in H+P.   Based on patients current state of illness, I anticipate that, after discharge, patient will require TBD.

## 2024-09-12 NOTE — PROGRESS NOTES
NURSING ADMISSION NOTE      Patient admitted via Ambulance  Oriented to room.  Safety precautions initiated.  Bed in low position.  Call light in reach.  Alert and Oriented x 4 VSS  Afebrile.    Denies nausea or emesis..  Denies pain at this time.  IVF continues per  orders.  Resting calmly in bed. Family at bedside.  Surgery informed that patient has been admitted.

## 2024-09-13 LAB
ALBUMIN SERPL-MCNC: 3.4 G/DL (ref 3.2–4.8)
ALBUMIN/GLOB SERPL: 1.2 {RATIO} (ref 1–2)
ALP LIVER SERPL-CCNC: 48 U/L
ALT SERPL-CCNC: 10 U/L
ANION GAP SERPL CALC-SCNC: 9 MMOL/L (ref 0–18)
AST SERPL-CCNC: 18 U/L (ref ?–34)
BASOPHILS # BLD AUTO: 0.04 X10(3) UL (ref 0–0.2)
BASOPHILS NFR BLD AUTO: 0.8 %
BILIRUB SERPL-MCNC: 0.5 MG/DL (ref 0.2–1.1)
BUN BLD-MCNC: 14 MG/DL (ref 9–23)
CALCIUM BLD-MCNC: 8.8 MG/DL (ref 8.7–10.4)
CHLORIDE SERPL-SCNC: 106 MMOL/L (ref 98–112)
CO2 SERPL-SCNC: 27 MMOL/L (ref 21–32)
CREAT BLD-MCNC: 0.67 MG/DL
EGFRCR SERPLBLD CKD-EPI 2021: 91 ML/MIN/1.73M2 (ref 60–?)
EOSINOPHIL # BLD AUTO: 0.25 X10(3) UL (ref 0–0.7)
EOSINOPHIL NFR BLD AUTO: 4.9 %
ERYTHROCYTE [DISTWIDTH] IN BLOOD BY AUTOMATED COUNT: 13.5 %
GLOBULIN PLAS-MCNC: 2.8 G/DL (ref 2–3.5)
GLUCOSE BLD-MCNC: 72 MG/DL (ref 70–99)
HCT VFR BLD AUTO: 37.1 %
HGB BLD-MCNC: 12.3 G/DL
IMM GRANULOCYTES # BLD AUTO: 0.02 X10(3) UL (ref 0–1)
IMM GRANULOCYTES NFR BLD: 0.4 %
LYMPHOCYTES # BLD AUTO: 0.96 X10(3) UL (ref 1–4)
LYMPHOCYTES NFR BLD AUTO: 18.9 %
MAGNESIUM SERPL-MCNC: 1.9 MG/DL (ref 1.6–2.6)
MAGNESIUM SERPL-MCNC: 1.9 MG/DL (ref 1.6–2.6)
MCH RBC QN AUTO: 30.4 PG (ref 26–34)
MCHC RBC AUTO-ENTMCNC: 33.2 G/DL (ref 31–37)
MCV RBC AUTO: 91.8 FL
MONOCYTES # BLD AUTO: 0.45 X10(3) UL (ref 0.1–1)
MONOCYTES NFR BLD AUTO: 8.9 %
NEUTROPHILS # BLD AUTO: 3.36 X10 (3) UL (ref 1.5–7.7)
NEUTROPHILS # BLD AUTO: 3.36 X10(3) UL (ref 1.5–7.7)
NEUTROPHILS NFR BLD AUTO: 66.1 %
OSMOLALITY SERPL CALC.SUM OF ELEC: 293 MOSM/KG (ref 275–295)
PLATELET # BLD AUTO: 300 10(3)UL (ref 150–450)
POTASSIUM SERPL-SCNC: 3.5 MMOL/L (ref 3.5–5.1)
PROT SERPL-MCNC: 6.2 G/DL (ref 5.7–8.2)
RBC # BLD AUTO: 4.04 X10(6)UL
SODIUM SERPL-SCNC: 142 MMOL/L (ref 136–145)
WBC # BLD AUTO: 5.1 X10(3) UL (ref 4–11)

## 2024-09-13 PROCEDURE — 99232 SBSQ HOSP IP/OBS MODERATE 35: CPT | Performed by: HOSPITALIST

## 2024-09-13 RX ORDER — DIPHENHYDRAMINE HCL 25 MG
25 CAPSULE ORAL EVERY 4 HOURS PRN
Status: DISCONTINUED | OUTPATIENT
Start: 2024-09-13 | End: 2024-09-19

## 2024-09-13 RX ORDER — DIPHENHYDRAMINE HYDROCHLORIDE 50 MG/ML
12.5 INJECTION INTRAMUSCULAR; INTRAVENOUS NIGHTLY PRN
Status: DISCONTINUED | OUTPATIENT
Start: 2024-09-13 | End: 2024-09-19

## 2024-09-13 NOTE — PAYOR COMM NOTE
--------------  ADMISSION REVIEW     Payor: [employer] MEDICARE ADV PPO  Subscriber #:  [id number]  Authorization Number: [id number]    Admit date: 9/11/24  Admit time:  6:40 PM       REVIEW DOCUMENTATION:     ED Provider Notes        ED Provider Notes signed by Aj Soria MD at 9/11/2024  5:12 PM       Author: Aj Soria MD Service: -- Author Type: Physician    Filed: 9/11/2024  5:12 PM Date of Service: 9/11/2024  2:00 PM Status: Signed    : Aj Soria MD (Physician)           Patient Seen in: [facility] [place]      History     Chief Complaint   Patient presents with    Abdomen/Flank Pain    Nausea/Vomiting/Diarrhea     Stated Complaint: states since Monday has had abd pain and vomiting, no diarrhea, no fever, sent *    Subjective:   HPI    74-year-old female with a past medical history as below including hypertension, hyperlipidemia, GERD, CAD and mild asthma presents with abdominal pain and vomiting that started 2 days ago.  Patient states abdominal pain is mainly in the epigastric and periumbilical area.  She reports multiple episodes of nonbloody/gray-colored emesis Monday afternoon and evening.  Patient is vomiting has gotten less frequent since then with last episode last night.  She still feels nauseated.   states she has had very little to eat and drink since Monday.  Patient states she had a normal bowel movement Monday.  Denies diarrhea, rectal bleeding or melena.  She denies fever, chills, cough or cold symptoms.  Denies urinary symptoms.    Objective:   Past Medical History:    Atherosclerosis of coronary artery    Description: mild, medical therapy.     Atrophic vaginitis    Bundle branch block    Calculus of gallbladder without cholecystitis without obstruction    Chronic venous insufficiency    Cyst of ovary    Diverticulosis of colon    Left side    Eczema    Gastroesophageal reflux disease with esophagitis    Hx of colonic polyps    Hyperlipidemia,  mixed    Hypertension, essential    Mild intermittent asthma without complication (HCC)    Primary generalized (osteo)arthritis    Ventral hernia without obstruction or gangrene    Repaired 2020              Past Surgical History:   Procedure Laterality Date      1980    Colonoscopy  2015    Diverticulosis    Egd N/A 2021    Procedure: ESOPHAGOGASTRODUODENOSCOPY, COLONOSCOPY, POSSIBLE BIOPSY, POSSIBLE POLYPECTOMY 69280, 24433;  Surgeon: Sampson Damon MD;  Location: [facility]    Inguinal hernia repair  1968    Lithotripsy      Ventral hernia repair  2020    1.7 inch Ventralex ST hernia patch                Social History     Socioeconomic History    Marital status:    Tobacco Use    Smoking status: Former    Smokeless tobacco: Never   Vaping Use    Vaping status: Never Used   Substance and Sexual Activity    Alcohol use: Yes     Alcohol/week: 0.0 standard drinks of alcohol     Comment: ocassionally    Drug use: No              Review of Systems    Positive for stated Chief Complaint: Abdomen/Flank Pain and Nausea/Vomiting/Diarrhea    Other systems are as noted in HPI.  Constitutional and vital signs reviewed.      All other systems reviewed and negative except as noted above.    Physical Exam     ED Triage Vitals [24 1222]   /65   Pulse 100   Resp 20   Temp 98 °F (36.7 °C)   Temp src Temporal   SpO2 96 %   O2 Device None (Room air)       Current Vitals:   Vital Signs  BP: 119/38  Pulse: 79  Resp: 16  Temp: 97.7 °F (36.5 °C)  Temp src: Oral    Oxygen Therapy  SpO2: 95 %  O2 Device: None (Room air)            Physical Exam  Vitals and nursing note reviewed.   Constitutional:       Appearance: She is well-developed.   HENT:      Head: Normocephalic and atraumatic.      Mouth/Throat:      Mouth: Mucous membranes are moist.   Eyes:      General: No scleral icterus.  Cardiovascular:      Rate and Rhythm: Normal rate and regular rhythm.   Pulmonary:       Effort: Pulmonary effort is normal.      Breath sounds: Normal breath sounds.   Abdominal:      General: Bowel sounds are normal. There is no distension.      Palpations: Abdomen is soft.      Tenderness: There is abdominal tenderness. There is no guarding or rebound.      Comments: Diffuse tenderness across upper abdomen greatest in the epigastrium  Soft reducible supraumbilical hernia   Skin:     General: Skin is warm and dry.   Neurological:      General: No focal deficit present.      Mental Status: She is alert and oriented to person, place, and time.      Cranial Nerves: No cranial nerve deficit.      Motor: No weakness.   Psychiatric:         Mood and Affect: Mood normal.         Behavior: Behavior normal.               ED Course     Labs Reviewed   COMP METABOLIC PANEL (14) - Abnormal; Notable for the following components:       Result Value    Glucose 109 (*)     Sodium 132 (*)     Potassium 3.4 (*)     Chloride 96 (*)     BUN 37 (*)     Creatinine 1.16 (*)     eGFR-Cr 49 (*)     Globulin  4.8 (*)     A/G Ratio 0.7 (*)     All other components within normal limits   CBC WITH DIFFERENTIAL WITH PLATELET - Abnormal; Notable for the following components:    WBC 14.8 (*)     RBC 5.46 (*)     HGB 16.4 (*)     HCT 48.9 (*)     .0 (*)     Neutrophil Absolute Prelim 11.19 (*)     Neutrophil Absolute 11.19 (*)     Monocyte Absolute 1.11 (*)     All other components within normal limits   URINALYSIS WITH CULTURE REFLEX - Abnormal; Notable for the following components:    Urine Color Patrizia (*)     Ketones Urine Trace (*)     Protein Urine 30 mg/dL (*)     Urobilinogen Urine 1.0 (*)     Nitrite Urine Positive (*)     Leukocyte Esterase Urine Trace (*)     All other components within normal limits   UA MICROSCOPIC ONLY, URINE - Abnormal; Notable for the following components:    WBC Urine 6-10 (*)     Bacteria Urine Rare (*)     Squamous Epi. Cells Few (*)     All other components within normal limits   ICTOTEST -  Abnormal; Notable for the following components:    Ictotest Positive (*)     All other components within normal limits   LIPASE - Normal   URINE CULTURE, ROUTINE             CT ABDOMEN+PELVIS(CONTRAST ONLY)(CPT=74177)    Result Date: 9/11/2024  CONCLUSION:  1. Findings concerning for perforated gastric ulcer with small amount of free air in the right upper quadrant. 2.  Mild gallbladder wall thickening with cholelithiasis may be seen with chronic versus acute cholecystitis.  This report was communicated by telephone to Dr. Soria at the dictation time shown below.   LOCATION:  [facility]   Dictated by (CST): Vinnie Bhatia MD on 9/11/2024 at 3:57 PM     Finalized by (CST): Vinnie Bhatia MD on 9/11/2024 at 4:03 PM            A total of 55 minutes of critical care time (exclusive of billable procedures) was administered to manage the patient's critical imaging findings due to her perforated gastric ulcer.  This involved direct patient intervention, complex decision making, and/or extensive discussions with the patient, family, and clinical staff.    MDM      74-year-old female with a past medical history as below including hypertension, hyperlipidemia, GERD, CAD and mild asthma presents with abdominal pain and vomiting that started 2 days ago.      Differential includes but is not limited to gastritis, PUD, pancreatitis, cholecystitis, dehydration, electrolyte abnormality less likely bowel obstruction    Labs show elevated WBC 14.8.  Patient also has some mild hyponatremia and hypokalemia with slightly elevated creatinine.  LFTs and lipase are normal.    Independent interpretation CT abdomen/pelvis shows some free air in the right upper quadrant.  Discussed with radiologist states that findings are concerning for perforated gastric ulcer.    Discussed with general surgery Dr. Arthur recommends sending patient to preop.  Patient treated with Protonix, Rocephin and Flagyl.  Discussed with hospitalist Dr. Goodrich.         Discussed with general surgery Dr. Conley states after his reviewing the CT the perforation appears contained.  Recommends admission but patient will likely not need surgical intervention today.      Admission disposition: 9/11/2024  4:28 PM                                        Medical Decision Making  Amount and/or Complexity of Data Reviewed  Independent Historian: spouse     Details: See HPI  Labs: ordered. Decision-making details documented in ED Course.  Radiology: ordered and independent interpretation performed. Decision-making details documented in ED Course.  Discussion of management or test interpretation with external provider(s): General Surgery, hospitalist, radiology    Risk  Decision regarding hospitalization.        Disposition and Plan     Clinical Impression:  1. Acute gastric ulcer with perforation (HCC)         Disposition:  Admit  9/11/2024  4:28 pm    Follow-up:  No follow-up provider specified.        Medications Prescribed:  Current Discharge Medication List                            Hospital Problems       Present on Admission  Date Reviewed: 3/10/2022            ICD-10-CM Noted POA    * (Principal) Acute gastric ulcer with perforation (HCC) K25.1 9/11/2024 Unknown                     Signed by Aj Soria MD on 9/11/2024  5:12 PM         MEDICATIONS ADMINISTERED IN LAST 1 DAY:  albuterol (Ventolin HFA) 108 (90 Base) MCG/ACT inhaler 2 puff       Date Action Dose Route User    9/13/2024 1242 Given 2 puff Inhalation Yolande Swanson RN    9/13/2024 0846 Given 2 puff Inhalation Yolande Swanson RN    9/12/2024 2006 Given 2 puff Inhalation Minerva Guerra RN          heparin (Porcine) 5000 UNIT/ML injection 5,000 Units       Date Action Dose Route User    9/13/2024 0846 Given 5,000 Units Subcutaneous (Left Lower Abdomen) Yolande Swanson RN    9/12/2024 2006 Given 5,000 Units Subcutaneous (Left Lower Abdomen) Minerva Guerra RN          HYDROmorphone (Dilaudid) 1 MG/ML injection 0.2 mg        Date Action Dose Route User    9/12/2024 2352 Given 0.2 mg Intravenous Minerva Guerra RN          ondansetron (Zofran) 4 MG/2ML injection 4 mg       Date Action Dose Route User    9/12/2024 1829 Given 4 mg Intravenous Yolande Swanson RN          pantoprazole (Protonix) 40 mg in sodium chloride 0.9% PF 10 mL IV push       Date Action Dose Route User    9/13/2024 0422 Given 40 mg Intravenous Minerva Guerra RN          piperacillin-tazobactam (Zosyn) 3.375 g in dextrose 5% 100 mL IVPB-ADDV       Date Action Dose Route User    9/13/2024 1242 New Bag 3.375 g Intravenous Yolande Swanson RN    9/13/2024 0422 New Bag 3.375 g Intravenous Minerva Guerra RN    9/12/2024 2006 New Bag 3.375 g Intravenous Minerva Guerra RN          sodium chloride 0.9% infusion       Date Action Dose Route User    9/13/2024 1206 Rate/Dose Change (none) Intravenous Yolande Swanson RN    9/13/2024 0421 New Bag (none) Intravenous Minerva Guerra RN    9/12/2024 1811 New Bag (none) Intravenous Leonarda Acosta RN            Vitals (last day)       Date/Time Temp Pulse Resp BP SpO2 Weight O2 Device O2 Flow Rate (L/min) [facility]    09/13/24 1249 -- -- -- 105/49 -- -- -- --     09/13/24 1247 -- -- -- 114/42 -- -- -- --     09/13/24 1245 -- -- -- 105/38 -- -- -- --     09/13/24 1220 97.7 °F (36.5 °C) 63 18 107/48 95 % -- None (Room air) --     09/13/24 0808 97.7 °F (36.5 °C) 57 18 103/41 97 % -- None (Room air) --     09/13/24 0422 98.1 °F (36.7 °C) 58 18 103/38 99 % -- None (Room air) --     09/12/24 2346 98.8 °F (37.1 °C) 61 18 103/42 98 % -- None (Room air) 0 L/min KS    09/12/24 2203 -- 78 -- -- -- -- -- -- KS    09/12/24 1914 98.4 °F (36.9 °C) 68 18 116/44 -- -- None (Room air) 0 L/min KS    09/12/24 1650 97.9 °F (36.6 °C) 70 16 111/45 100 % -- None (Room air) --     09/12/24 1322 97.6 °F (36.4 °C) 63 16 113/53 100 % -- None (Room air) --     09/12/24 0807 97.9 °F (36.6 °C) 60 16 101/48 100 % -- None (Room air) --     09/12/24  0409 97.9 °F (36.6 °C) 60 16 108/56 100 % -- None (Room air) -- AJ         H&P    Subjective:  History of Present Illness:      Yudy Lama is a 74 year old female with PMHx essential hypertension, hyperlipidemia, GERD presents to hospital today with abdominal pain for about a month. Pt first noticed some intermittent abd x1 month. Had some emesis over last 24h. No fevers or chills. Pt decided to come in for further evaluation.      -S/p IV ceftriaxone, IV metronidazole, IV Zofran, IV Protonix, IV fluid bolus, general surgery consulted in the emergency           Assessment & Plan:  #Perforated gastric ulcer  #Acute vs chronic cholecystitis  -gen sx consulted from ER  -iv zosyn (9/11-)  -iv PPI BID  -pain meds, ivf     #Hyponatremia  #Acute kidney injury  #Dehydration  -ivf, repeat labs     #Pyuira, asymptomatic   -Ucx pending     #Thrombocytosis  -2/2 above     #Hypokalemia  -Replace and monitor      #CAD     #Essential hypertension  -PTA: Lisinopril  -PRN hydralazine for now     #Hyperlipidemia  -PTA: Rosuvastatin, fenofibrate     #GERD  -PTA: PPI    #Mild intermittent asthma  -prn albuterol      #ACP  -full code,    9/11 GEN SURGERY CONSULT NOTE  Reason for Surgical Consultation: Seeing this patient in consultation at the request of Dr. Soria for perforated gastric ulcer     History of Present Illness:  Yudy Lama is a a(n) 74 year old female who presented to [facility] emergency room after seeing her primary care physician today.  Patient reports having some abdominal bloating which led to severe abdominal pain and vomiting starting Monday.  Eventually, the vomiting improved but patient followed up with her primary care to be evaluated.  Due to the concern for decreased p.o. intake and severe dehydration, patient was recommended to go to the emergency room.     In [facility] emergency room, patient was hemodynamically stable.  She had a leukocytosis to 14 and a mild DEBORAH at creatinine 1.16.  Labs  were otherwise normal.  CT imaging was performed and showed small bubbles of free air near the liver and thickening of the distal stomach concerning for perforated gastric ulcer.  General surgery was consulted for further evaluation and management.     On interview, patient reports having a similar episode approximately 1 month ago.  She states that she became bloated and had severe pain leading to nausea and vomiting.  She was out of town at this time and did not go to the hospital.  Eventually, her symptoms subsided.  She reports having a similar episode nearly 5 years ago where she was in severe pain.  She was out of town again during this episode.  She reports having an endoscopy at that time which was negative.  Since being admitted to the hospital, patient reports improvement of her abdominal pain as well as nausea and vomiting.     She reports having a  as well as a ventral hernia repair with mesh as her only abdominal surgeries.      Impression/Plan:       74 year old female with contained perforation of gastric ulcer     CT images were reviewed personally by me  CT shows potentially some fluid collection near the anterior wall of the distal stomach/pylorus with small bubbles of free air noted around the liver  There is no gross pneumoperitoneum concerning for free perforation  No acute surgical intervention at this time  Recommend trial of conservative management as patient may have sealed her perforation  Recommend NG tube placement for gastric decompression  IV fluids for hydration  IV antibiotics  Monitor for leukocytosis and fevers  If patient continues to show signs of infection, she may need IR drainage of fluid collection on imaging     Surgery will continue to follow  Rest of care per primary     GENSURGERY NOTE    Subjective:  NG tube was placed with 300 cc out.  Patient reports some worsening of her abdominal pain and reports back pain today.  She still feels bloated.  She remains  afebrile.  Blood pressure was soft in the high 90s overnight but is now in the 100s.     Objective/Physical Exam:        Intake/Output Summary (Last 24 hours) at 9/12/2024 0823  Last data filed at 9/12/2024 0807      Gross per 24 hour   Intake 907 ml   Output 800 ml   Net 107 ml         Vital Signs:  Blood pressure 101/48, pulse 60, temperature 97.9 °F (36.6 °C), temperature source Oral, resp. rate 16, height 65\", weight 157 lb (71.2 kg), last menstrual period 01/01/1995, SpO2 100%, not currently breastfeeding.    Abdomen:  Soft, non-distended, tender to palpation but no rebound or peritoneal signs          Labs:        Lab Results   Component Value Date     WBC 8.6 09/12/2024     RBC 4.31 09/12/2024     HGB 12.8 09/12/2024     HCT 39.5 09/12/2024     MCV 91.6 09/12/2024     MCH 29.7 09/12/2024     MCHC 32.4 09/12/2024     RDW 13.7 09/12/2024     .0 09/12/2024            Lab Results   Component Value Date      09/12/2024     K 3.8 09/12/2024      09/12/2024     CO2 29.0 09/12/2024     BUN 23 09/12/2024     CREATSERUM 0.81 09/12/2024     GLU 86 09/12/2024     CA 8.9 09/12/2024     ALKPHO 49 09/12/2024     ALT 10 09/12/2024     AST 19 09/12/2024     BILT 0.6 09/12/2024     ALB 3.4 09/12/2024     TP 6.5 09/12/2024            Lab Results   Component Value Date     MG 1.8 09/12/2024        Assessment/Plan:     74 year old female with contained gastric ulcer perforation     The patient remains afebrile  Patient did have soft blood pressures in the high 90s but this is up to the 100s this morning  Leukocytosis has resolved  On physical exam, patient remains tender but is not peritonitic  NG tube has been placed to decompress her stomach  No acute surgical invention at this time  Will continue conservative management with NG tube decompression  Serial abdominal exams  I discussed with the patient that she may need surgical intervention especially if she begins to decompensate with her hemodynamics or have  worsening abdominal pain  Continue IV antibiotics  Surgery will continue to follow  Rest of care per primary    9/13 GENSURGERY NOTE    Subjective:  Patient is resting comfortably in bed.  No acute overnight events or complaints.  She continues to endorse upper abdominal discomfort, though states that this is stable or may be slightly improved.  NG remains in place.  Reports some mild nausea.  Afebrile.     Objective/Physical Exam:  General: Alert, orientated x3.  Cooperative.  No apparent distress.  Vital Signs:  Blood pressure 103/41, pulse 57, temperature 97.7 °F (36.5 °C), temperature source Oral, resp. rate 18, height 65\", weight 157 lb (71.2 kg),    Abdomen:  Soft, non distended, mild epigastric tenderness, with no rebound or guarding.  No peritoneal signs.       Labs:        Lab Results   Component Value Date     WBC 5.1 09/13/2024     HGB 12.3 09/13/2024     HCT 37.1 09/13/2024     .0 09/13/2024            Lab Results   Component Value Date      09/13/2024     K 3.5 09/13/2024      09/13/2024     CO2 27.0 09/13/2024     BUN 14 09/13/2024     CREATSERUM 0.67 09/13/2024     GLU 72 09/13/2024     CA 8.8 09/13/2024     ALKPHO 48 09/13/2024     ALT 10 09/13/2024     AST 18 09/13/2024     BILT 0.5 09/13/2024     ALB 3.4 09/13/2024     TP 6.2 09/13/2024        Assessment       Contained gastric ulcer perforation     Plan:  Patient remained stable/improving with conservative management.  No acute surgical intervention planned at this time.  Continue n.p.o. with NG to LIS  Continue IV antibiotics and IV Protonix  Analgesics and antiemetics as needed  Plan to repeat CT with oral contrast prior to removal of NG  Ambulate and up to chair  DVT prophylaxis with heparin  GI prophylaxis with Protonix

## 2024-09-13 NOTE — PROGRESS NOTES
A&Ox4. VSS. RA. .  Telemetry: NSR  GI: Abdomen soft, nondistended.   Denies nausea.NG to LIS, small amount of brown drainage.  : Voids.  Pain controlled with PRN pain medications  Up ad cary.  Diet:strict NPO  IVF running per order.  All appropriate safety measures in place. All questions and concerns addressed. Will continue to monitor

## 2024-09-13 NOTE — PROGRESS NOTES
[facility]   part of [facility]     Hospitalist Progress Note     Yudy Lama Patient Status:  Inpatient    1950 MRN [id number]   Location [facility] 3NW-A Attending Bradly Fuentes MD   Hosp Day # 2 PCP Robert Kraus MD     Chief Complaint: abd pain    Subjective:     Improving. Some nausea but no vomiting.     Objective:    Review of Systems:   A comprehensive review of systems was completed; pertinent positive and negatives stated in subjective.    Vital signs:  Temp:  [97.6 °F (36.4 °C)-98.8 °F (37.1 °C)] 97.7 °F (36.5 °C)  Pulse:  [57-78] 57  Resp:  [16-18] 18  BP: (103-116)/(38-53) 103/41  SpO2:  [97 %-100 %] 97 %    Physical Exam:    General: No acute distress  Respiratory: No wheezes, no rhonchi  Cardiovascular: S1, S2, regular rate and rhythm  Abdomen: Soft, epigastric-tender, non-distended, positive bowel sounds  Neuro: No new focal deficits.   Extremities: No edema      Diagnostic Data:    Labs:  Recent Labs   Lab 24  1355 24  0525 24  0601   WBC 14.8* 8.6 5.1   HGB 16.4* 12.8 12.3   MCV 89.6 91.6 91.8   .0* 371.0 300.0       Recent Labs   Lab 24  1355 24  0525 24  0601   * 86 72   BUN 37* 23 14   CREATSERUM 1.16* 0.81 0.67   CA 9.7 8.9 8.8   ALB 3.4 3.4 3.4   * 140 142   K 3.4* 3.8 3.5   CL 96* 105 106   CO2 29.0 29.0 27.0   ALKPHO 64 49* 48*   AST 19 19 18   ALT 22 10 10   BILT 0.8 0.6 0.5   TP 8.2 6.5 6.2       Estimated Creatinine Clearance: 64.3 mL/min (based on SCr of 0.67 mg/dL).    No results for input(s): \"TROP\", \"TROPHS\", \"CK\" in the last 168 hours.    No results for input(s): \"PTP\", \"INR\" in the last 168 hours.               Microbiology    Hospital Encounter on 24   1. Urine Culture, Routine     Status: None    Collection Time: 24  3:15 PM    Specimen: Urine, clean catch   Result Value Ref Range    Urine Culture No Growth at 18-24 hrs. N/A         Imaging: Reviewed in Epic.    Medications:    sodium  chloride   Intravenous Once    piperacillin-tazobactam  3.375 g Intravenous Q8H    pantoprazole  40 mg Intravenous Q12H    albuterol  2 puff Inhalation QID    heparin  5,000 Units Subcutaneous 2 times per day       Assessment & Plan:      #Perforated gastric ulcer  #Acute vs chronic cholecystitis - no RUQ pain, likely chronic  -gen sx consulted from ER  -iv zosyn (9/11-)  -iv PPI BID  -pain meds, ivf  -no urgent surgery  -Ngt to decompress  -Likely CT tomrorow     #borderline low BP  #Hyponatremia  #Acute kidney injury - resolved  #Dehydration  -increase ivf, repeat labs     #Pyuira, asymptomatic   -Ucx no growth. No abx needed     #Thrombocytosis  -2/2 above     #Hypokalemia  -Replace and monitor      #CAD     #Essential hypertension  -PTA: Lisinopril  -PRN hydralazine for now     #Hyperlipidemia  -PTA: Rosuvastatin, fenofibrate     #GERD  -PTA: PPI    #Mild intermittent asthma  -prn albuterol      #ACP  -full code         Bradly Fuentes MD    Supplementary Documentation:     Quality:  DVT Mechanical Prophylaxis:   SCDs,    DVT Pharmacologic Prophylaxis   Medication    heparin (Porcine) 5000 UNIT/ML injection 5,000 Units                Code Status: Full Code  Rosales: No urinary catheter in place  Rosales Duration (in days):   Central line:    ROSA:     Discharge is dependent on: course  At this point Ms. Lama is expected to be discharge to: home    The 21st Century Cures Act makes medical notes like these available to patients in the interest of transparency. Please be advised this is a medical document. Medical documents are intended to carry relevant information, facts as evident, and the clinical opinion of the practitioner. The medical note is intended as peer to peer communication and may appear blunt or direct. It is written in medical language and may contain abbreviations or verbiage that are unfamiliar.              **Certification      PHYSICIAN Certification of Need for Inpatient Hospitalization - Initial  Certification    Patient will require inpatient services that will reasonably be expected to span two midnight's based on the clinical documentation in H+P.   Based on patients current state of illness, I anticipate that, after discharge, patient will require TBD.

## 2024-09-13 NOTE — PLAN OF CARE
Upon assessment pt A&Ox4.  VSS on room air.  Nsr on tele.  Denies chest pain, sob.  Intermittent nausea pt states related to NG tube.  Abdomen soft, round, tender.  Passing gas. NG secured at 55cm to right nare. Voids freely in adequate amounts. npo. Ivf and abx running per order.  Pt updated on poc. Call light within reach.

## 2024-09-13 NOTE — PROGRESS NOTES
[facility]  Progress Note    Yudy Lama Patient Status:  Inpatient    1950 MRN [id number]   Location [facility] 3NW-A Attending Bradly Fuentes MD   Hosp Day # 2 PCP Robert Kraus MD     Subjective:  Patient is resting comfortably in bed.  No acute overnight events or complaints.  She continues to endorse upper abdominal discomfort, though states that this is stable or may be slightly improved.  NG remains in place.  Reports some mild nausea.  Afebrile.    Objective/Physical Exam:  General: Alert, orientated x3.  Cooperative.  No apparent distress.  Vital Signs:  Blood pressure 103/41, pulse 57, temperature 97.7 °F (36.5 °C), temperature source Oral, resp. rate 18, height 65\", weight 157 lb (71.2 kg), last menstrual period 1995, SpO2 97%, not currently breastfeeding.  Lungs: No respiratory distress.  Cardiac: Regular rate and rhythm.   Abdomen:  Soft, non distended, mild epigastric tenderness, with no rebound or guarding.  No peritoneal signs.   Extremities:  No lower extremity edema noted.        Labs:  Lab Results   Component Value Date    WBC 5.1 2024    HGB 12.3 2024    HCT 37.1 2024    .0 2024     Lab Results   Component Value Date     2024    K 3.5 2024     2024    CO2 27.0 2024    BUN 14 2024    CREATSERUM 0.67 2024    GLU 72 2024    CA 8.8 2024    ALKPHO 48 2024    ALT 10 2024    AST 18 2024    BILT 0.5 2024    ALB 3.4 2024    TP 6.2 2024     Lab Results   Component Value Date    INR 1.0 2020    INR 1 2011       I/O last 3 completed shifts:  In: 3135 [I.V.:3135]  Out: 2000 [Urine:1250; Emesis/NG output:750]  I/O this shift:  In: 0   Out: 600 [Urine:400; Emesis/NG output:200]    Assessment  Patient Active Problem List   Diagnosis    Essential hypertension    Mixed hyperlipidemia    Other hemorrhoids    Mild intermittent asthma without  complication (HCC)    Colon cancer screening    Eczema    Medicare annual wellness visit, subsequent    Visit for screening mammogram    Fatty liver    Asymptomatic varicose veins of unspecified lower extremity    Calculus of gallbladder without cholecystitis without obstruction    Atherosclerosis of coronary artery    Gastroesophageal reflux disease without esophagitis    Benign hematuria    Atrophic vaginitis    Primary generalized (osteo)arthritis    Irreducible umbilical hernia    Chronic venous insufficiency    History of renal calculi    Post-menopause    Abnormal chest CT    Impaired fasting glucose    Diverticulosis of colon    Hx of colonic polyps    Other irritable bowel syndrome    Well woman exam with routine gynecological exam    Kappa light chain deposition disorder (HCC)    Bronchiectasis without complication (HCC)    Acute gastric ulcer with perforation (HCC)       Contained gastric ulcer perforation    Plan:  Patient remained stable/improving with conservative management.  No acute surgical intervention planned at this time.  Continue n.p.o. with NG to LIS  Continue IV antibiotics and IV Protonix  Analgesics and antiemetics as needed  Plan to repeat CT with oral contrast prior to removal of NG  Ambulate and up to chair  DVT prophylaxis with heparin  GI prophylaxis with Protonix      Vymatilde Tristan PA-C  9/13/2024  12:20 PM    Patient seen and examined, I agree with the documentation above with the following addendum.    No acute events overnight. Feels improved. Reports abdominal pain is decreasing.   Physical exam:  General: nad  Abdomen: soft, tender in the epigastrium, no rebound or guarding, non distended      Assesment & Plan:  74 year old female who presented with perforated gastric ulcer   This seems to have sealed and responding well to medical management   CT with oral contrast tomorrow to assess for leak      Jose Mendenhall MD  [facility] [place]

## 2024-09-13 NOTE — PROGRESS NOTES
Orthostatic BP   09/13/24 1245 09/13/24 1247 09/13/24 1249   Vital Signs   /38 114/42 105/49   MAP (mmHg) (!) 53 (!) 60 (!) 62   BP Location Left arm Left arm Left arm   BP Method Automatic Automatic Automatic   Patient Position Lying Sitting Standing

## 2024-09-13 NOTE — PROGRESS NOTES
Orthostatic BP     09/13/24 1245 09/13/24 1247 09/13/24 1249   Vital Signs   /38 114/42 105/49   MAP (mmHg) (!) 53 (!) 60 (!) 62   BP Location Left arm Left arm Left arm   BP Method Automatic Automatic Automatic   Patient Position Lying Sitting Lying

## 2024-09-14 ENCOUNTER — APPOINTMENT (OUTPATIENT)
Dept: CT IMAGING | Facility: HOSPITAL | Age: 74
DRG: 381 | End: 2024-09-14
Payer: MEDICARE

## 2024-09-14 ENCOUNTER — APPOINTMENT (OUTPATIENT)
Dept: CT IMAGING | Facility: HOSPITAL | Age: 74
End: 2024-09-14
Payer: MEDICARE

## 2024-09-14 LAB
CREAT BLD-MCNC: 0.55 MG/DL
EGFRCR SERPLBLD CKD-EPI 2021: 95 ML/MIN/1.73M2 (ref 60–?)
PLATELET # BLD AUTO: 260 10(3)UL (ref 150–450)

## 2024-09-14 PROCEDURE — 99233 SBSQ HOSP IP/OBS HIGH 50: CPT | Performed by: HOSPITALIST

## 2024-09-14 PROCEDURE — 74177 CT ABD & PELVIS W/CONTRAST: CPT

## 2024-09-14 RX ORDER — BISACODYL 10 MG
10 SUPPOSITORY, RECTAL RECTAL
Status: DISCONTINUED | OUTPATIENT
Start: 2024-09-14 | End: 2024-09-19

## 2024-09-14 NOTE — PROGRESS NOTES
[facility]  Progress Note    Yudy SEBAS Kelby Patient Status:  Inpatient    1950 MRN [id number]   Location [facility] 3NW-A Attending Bradly Fuentes MD   Hosp Day # 3 PCP Robert Kraus MD     Subjective:  The patient is resting in bed.  She reports feeling better today.  She reports mild epigastric discomfort.  She does report a short episode of nausea yesterday.  She reports passing flatus, denies having a bowel movement.    Objective/Physical Exam:  General: Alert, orientated x3.  Cooperative.  No apparent distress.  Vital Signs:  Blood pressure 109/43, pulse 56, temperature 97.9 °F (36.6 °C), temperature source Oral, resp. rate 16, height 65\", weight 157 lb (71.2 kg), last menstrual period 1995, SpO2 96%, not currently breastfeeding.  HEENT: Normocephalic, atraumatic. No scleral icterus.  NG tube in place.  Abdomen:  Soft, non-distended, slightly tender to palpation in epigastrium, with no rebound or guarding.  No peritoneal signs.    Labs:  CBC:    Lab Results   Component Value Date    WBC 5.1 2024    WBC 8.6 2024    WBC 14.8 (H) 2024     Lab Results   Component Value Date    HEMOGLOBIN 14.9 2016    HEMOGLOBIN 14.3 2016    HEMOGLOBIN 14.9 2016    HGB 12.3 2024    HGB 12.8 2024    HGB 16.4 (H) 2024      Lab Results   Component Value Date    .0 2024    .0 2024    .0 2024     Lab Results   Component Value Date    .0 2024    CREATSERUM 0.55 2024         Assessment/Plan:  Patient Active Problem List   Diagnosis    Essential hypertension    Mixed hyperlipidemia    Other hemorrhoids    Mild intermittent asthma without complication (HCC)    Colon cancer screening    Eczema    Medicare annual wellness visit, subsequent    Visit for screening mammogram    Fatty liver    Asymptomatic varicose veins of unspecified lower extremity    Calculus of gallbladder without cholecystitis without  obstruction    Atherosclerosis of coronary artery    Gastroesophageal reflux disease without esophagitis    Benign hematuria    Atrophic vaginitis    Primary generalized (osteo)arthritis    Irreducible umbilical hernia    Chronic venous insufficiency    History of renal calculi    Post-menopause    Abnormal chest CT    Impaired fasting glucose    Diverticulosis of colon    Hx of colonic polyps    Other irritable bowel syndrome    Well woman exam with routine gynecological exam    Kappa light chain deposition disorder (HCC)    Bronchiectasis without complication (HCC)    Acute gastric ulcer with perforation (HCC)     Contained gastric ulcer perforation    Repeat CT with oral contrast today.  Further recommendations pending results.  Continue NPO.  Continue NG tube to low intermittent suction.  Continue IV antibiotics.  Continue pain control and antiemetics as needed.  Encourage ambulation and up to chair.  DVT prophylaxis with heparin.  GI prophylaxis with Protonix.      STEFANIE Romo  9/14/2024  11:18 AM     Patient seen and examined, I agree with the documentation above with the following addendum.    No acute events overnight. Feels improved. Mild pain in the epigastrium. Some nausea yesterday that resolved. Vitals stable, no fevers.   Physical exam:  General: NAD   Abdomen: soft, tender in the epigastrium, no rebound or guarding      Assesment & Plan:  74 year old female who presented with a perforated gastric ulcer  This appears to have spontaneously sealed  Clinically stable and improving  Will obtain a CT scan with oral and iv contrast today to assess for any evidence of leak  Further plans pending CT results      Jose Mendenhall MD  [facility] Surgery

## 2024-09-14 NOTE — PROGRESS NOTES
[facility]   part of [facility]     Hospitalist Progress Note     Yudy Lama Patient Status:  Inpatient    1950 MRN [id number]   Location [facility] 3NW-A Attending Bradly Fuentes MD   Hosp Day # 3 PCP Robert Kraus MD     Chief Complaint: abd pain    Subjective:     Improving. No complaints     Objective:    Review of Systems:   A comprehensive review of systems was completed; pertinent positive and negatives stated in subjective.    Vital signs:  Temp:  [97.7 °F (36.5 °C)-98.6 °F (37 °C)] 98.3 °F (36.8 °C)  Pulse:  [56-68] 68  Resp:  [16-18] 16  BP: ()/(38-64) 140/45  SpO2:  [94 %-97 %] 97 %    Physical Exam:    General: No acute distress  Respiratory: No wheezes, no rhonchi  Cardiovascular: S1, S2, regular rate and rhythm  Abdomen: Soft, epigastric-tender, non-distended, positive bowel sounds  Neuro: No new focal deficits.   Extremities: No edema      Diagnostic Data:    Labs:  Recent Labs   Lab 24  1355 24  0525 24  0601 24  0626   WBC 14.8* 8.6 5.1  --    HGB 16.4* 12.8 12.3  --    MCV 89.6 91.6 91.8  --    .0* 371.0 300.0 260.0       Recent Labs   Lab 24  1355 24  0525 24  0601 24  0900   * 86 72  --    BUN 37* 23 14  --    CREATSERUM 1.16* 0.81 0.67 0.55   CA 9.7 8.9 8.8  --    ALB 3.4 3.4 3.4  --    * 140 142  --    K 3.4* 3.8 3.5  --    CL 96* 105 106  --    CO2 29.0 29.0 27.0  --    ALKPHO 64 49* 48*  --    AST 19 19 18  --    ALT 22 10 10  --    BILT 0.8 0.6 0.5  --    TP 8.2 6.5 6.2  --        Estimated Creatinine Clearance: 78.3 mL/min (based on SCr of 0.55 mg/dL).    No results for input(s): \"TROP\", \"TROPHS\", \"CK\" in the last 168 hours.    No results for input(s): \"PTP\", \"INR\" in the last 168 hours.               Microbiology    Hospital Encounter on 24   1. Urine Culture, Routine     Status: None    Collection Time: 24  3:15 PM    Specimen: Urine, clean catch   Result Value Ref Range     Urine Culture No Growth at 18-24 hrs. N/A         Imaging: Reviewed in Epic.    Medications:    sodium chloride   Intravenous Once    piperacillin-tazobactam  3.375 g Intravenous Q8H    pantoprazole  40 mg Intravenous Q12H    albuterol  2 puff Inhalation QID    heparin  5,000 Units Subcutaneous 2 times per day       Assessment & Plan:      #Perforated gastric ulcer  #Acute vs chronic cholecystitis - no RUQ pain, likely chronic  -gen sx consulted from ER  -iv zosyn (9/11-)  -iv PPI BID  -pain meds, ivf  -no urgent surgery  -Ngt to decompress  -CT reviewed indep. Possible fistua b/w bowel and GB? Awit gen surg recs. May need surgery      #borderline low BP  #Hyponatremia  #Acute kidney injury - resolved  #Dehydration  -decrease ivf, repeat labs     #Pyuira, asymptomatic   -Ucx no growth. No abx needed     #Thrombocytosis  -2/2 above     #Hypokalemia  -Replace and monitor      #CAD     #Essential hypertension  -PTA: Lisinopril  -PRN hydralazine for now     #Hyperlipidemia  -PTA: Rosuvastatin, fenofibrate     #GERD  -PTA: PPI    #Mild intermittent asthma  -prn albuterol      #ACP  -full code         Bradly Fuentes MD    Supplementary Documentation:     Quality:  DVT Mechanical Prophylaxis:   SCDs,    DVT Pharmacologic Prophylaxis   Medication    heparin (Porcine) 5000 UNIT/ML injection 5,000 Units                Code Status: Full Code  Rosales: No urinary catheter in place  Rosales Duration (in days):   Central line:    ROSA:     Discharge is dependent on: course  At this point Ms. Lama is expected to be discharge to: home    The 21st Century Cures Act makes medical notes like these available to patients in the interest of transparency. Please be advised this is a medical document. Medical documents are intended to carry relevant information, facts as evident, and the clinical opinion of the practitioner. The medical note is intended as peer to peer communication and may appear blunt or direct. It is written in medical  language and may contain abbreviations or verbiage that are unfamiliar.              **Certification      PHYSICIAN Certification of Need for Inpatient Hospitalization - Initial Certification    Patient will require inpatient services that will reasonably be expected to span two midnight's based on the clinical documentation in H+P.   Based on patients current state of illness, I anticipate that, after discharge, patient will require TBD.

## 2024-09-14 NOTE — PLAN OF CARE
Patient A & O x4. VSS, on RA. Denies pain at this time. NGT to LIS to right nare. Intermittent nausea. Voiding freely. Up standby assist. IVF infusing per order.IV abx. NPO. Safety measures in place. Instructed to use call light.

## 2024-09-14 NOTE — PLAN OF CARE
Abd soft, no c/o n/v this am.  No c/o pain.  NGT to rt nare draining light brown output.  Passing flatus.Tele NSR, ambulates without problems.  Cont. IVF, abx, protonix.  Awaiting CT to be done.

## 2024-09-15 PROBLEM — K31.6 GASTRIC FISTULA: Status: ACTIVE | Noted: 2024-09-15

## 2024-09-15 LAB
ANION GAP SERPL CALC-SCNC: 11 MMOL/L (ref 0–18)
BASOPHILS # BLD AUTO: 0.03 X10(3) UL (ref 0–0.2)
BASOPHILS NFR BLD AUTO: 0.4 %
BUN BLD-MCNC: 7 MG/DL (ref 9–23)
CALCIUM BLD-MCNC: 9.2 MG/DL (ref 8.7–10.4)
CHLORIDE SERPL-SCNC: 106 MMOL/L (ref 98–112)
CO2 SERPL-SCNC: 25 MMOL/L (ref 21–32)
CREAT BLD-MCNC: 0.58 MG/DL
EGFRCR SERPLBLD CKD-EPI 2021: 94 ML/MIN/1.73M2 (ref 60–?)
EOSINOPHIL # BLD AUTO: 0.18 X10(3) UL (ref 0–0.7)
EOSINOPHIL NFR BLD AUTO: 2.2 %
ERYTHROCYTE [DISTWIDTH] IN BLOOD BY AUTOMATED COUNT: 13.3 %
GLUCOSE BLD-MCNC: 80 MG/DL (ref 70–99)
HCT VFR BLD AUTO: 37.3 %
HGB BLD-MCNC: 11.8 G/DL
IMM GRANULOCYTES # BLD AUTO: 0.04 X10(3) UL (ref 0–1)
IMM GRANULOCYTES NFR BLD: 0.5 %
LYMPHOCYTES # BLD AUTO: 0.83 X10(3) UL (ref 1–4)
LYMPHOCYTES NFR BLD AUTO: 10.2 %
MCH RBC QN AUTO: 29.5 PG (ref 26–34)
MCHC RBC AUTO-ENTMCNC: 31.6 G/DL (ref 31–37)
MCV RBC AUTO: 93.3 FL
MONOCYTES # BLD AUTO: 0.55 X10(3) UL (ref 0.1–1)
MONOCYTES NFR BLD AUTO: 6.8 %
NEUTROPHILS # BLD AUTO: 6.48 X10 (3) UL (ref 1.5–7.7)
NEUTROPHILS # BLD AUTO: 6.48 X10(3) UL (ref 1.5–7.7)
NEUTROPHILS NFR BLD AUTO: 79.9 %
OSMOLALITY SERPL CALC.SUM OF ELEC: 291 MOSM/KG (ref 275–295)
PLATELET # BLD AUTO: 254 10(3)UL (ref 150–450)
POTASSIUM SERPL-SCNC: 3.1 MMOL/L (ref 3.5–5.1)
RBC # BLD AUTO: 4 X10(6)UL
SODIUM SERPL-SCNC: 142 MMOL/L (ref 136–145)
WBC # BLD AUTO: 8.1 X10(3) UL (ref 4–11)

## 2024-09-15 PROCEDURE — 99233 SBSQ HOSP IP/OBS HIGH 50: CPT | Performed by: HOSPITALIST

## 2024-09-15 RX ORDER — FLUTICASONE PROPIONATE AND SALMETEROL XINAFOATE 230; 21 UG/1; UG/1
2 AEROSOL, METERED RESPIRATORY (INHALATION) 2 TIMES DAILY
Status: DISCONTINUED | OUTPATIENT
Start: 2024-09-15 | End: 2024-09-19

## 2024-09-15 NOTE — PLAN OF CARE
BS active, passing flatus, no c/o pain, no n/v.  Pt. stated \"had a couple little bowel movements after her initial small bowel movement last night.\"  Pt. Having some rectal bleeding from hemorrhoids which pt. states happens sometimes at home.  Pad placed.  Declined suppository at this time.  Instructed pt. to call when if she wanted it later.  NGT to LIS with brown clear drainage.  NPO.  RLL with crackles, Left lung clear.  Encouraged CDB and I.S.  Performed IS to 1500.  Pt. on scheduled inhaler  Pt. states she does use Cpap machine at home but MD did not want pt to utilize here.  On tele NSR.  SPO2 spot checks wnl.  Will add continuous pulse ox at night.  Pt. ambulated in halls well.

## 2024-09-15 NOTE — PROGRESS NOTES
Faxed release of records and hardcopy CD's of scans form for patient to [facility] in [place].  Daughter-in-law will pick them up and Express Mail them here when available.

## 2024-09-15 NOTE — CONSULTS
[facility]    Yudy Lama Patient Status:  Inpatient    1950 MRN [id number]   Location [facility] 3NW-A Attending Bradly Fuentes MD   Hosp Day # 4 PCP Robert Kraus MD     Yudynaz Lama is a 74 year old female for NV abdominal pain consult.  Has had symptoms for 1 month with dyspepsia.  Sig pain NV 6 days ago.  Had recurrent emesis no blood.  Had abd pain.  CT thick antrum with fluid collection.  Perf ?  Repeat CT scan yesterday.  Cholecysto-gastric fistula thick antrum, gallstone in colon?  Pain is better. Denies rectal bleeding, diarrhea, constipation, or weight loss. Had abd pain in 2020 EGD colon CT then WNL.   Lab Results   Component Value Date    WBC 8.1 09/15/2024    HGB 11.8 09/15/2024    HCT 37.3 09/15/2024    .0 09/15/2024    CREATSERUM 0.58 09/15/2024    BUN 7 09/15/2024     09/15/2024    K 3.1 09/15/2024     09/15/2024    CO2 25.0 09/15/2024    GLU 80 09/15/2024    CA 9.2 09/15/2024     Allergies: No Known Allergies   No current outpatient medications on file.      Past Medical History:    Atherosclerosis of coronary artery    Description: mild, medical therapy.     Atrophic vaginitis    Bundle branch block    Calculus of gallbladder without cholecystitis without obstruction    Chronic venous insufficiency    Cyst of ovary    Diverticulosis of colon    Left side    Eczema    Gastroesophageal reflux disease with esophagitis    Hx of colonic polyps    Hyperlipidemia, mixed    Hypertension, essential    Mild intermittent asthma without complication (HCC)    Primary generalized (osteo)arthritis    Ventral hernia without obstruction or gangrene    Repaired 2020      Past Surgical History:   Procedure Laterality Date      1980    Colonoscopy  2015    Diverticulosis    Egd N/A 2021    Procedure: ESOPHAGOGASTRODUODENOSCOPY, COLONOSCOPY, POSSIBLE BIOPSY, POSSIBLE POLYPECTOMY 83548, 47437;  Surgeon: Sampson Damon MD;  Location:  [facility]    Inguinal hernia repair  1968    Lithotripsy      Ventral hernia repair  2020    1.7 inch Ventralex ST hernia patch      Social History     Socioeconomic History    Marital status:    Tobacco Use    Smoking status: Former    Smokeless tobacco: Never   Vaping Use    Vaping status: Never Used   Substance and Sexual Activity    Alcohol use: Yes     Alcohol/week: 0.0 standard drinks of alcohol     Comment: ocassionally    Drug use: No     Social Determinants of Health     Food Insecurity: No Food Insecurity (2024)    Food Insecurity     Food Insecurity: Never true   Transportation Needs: No Transportation Needs (2024)    Transportation Needs     Lack of Transportation: No   Housing Stability: Low Risk  (2024)    Housing Stability     Housing Instability: No     Occupation:    FAMILY HX: GI HX: None, no hx of colon cancer  Family History   Problem Relation Age of Onset    Heart Surgery Mother 63    Other (Other) Mother          at 77y/o;  AAA surgery.    Breast Cancer Maternal Grandmother 87        87    Heart Surgery Brother         Coronary stents in 40's; CABG in 50's    Other (Other) Brother         AAA repair        REVIEW OF SYSTEMS:   GEN: feels well otherwise, no F/C, no wt loss  SKIN: no skin lesions  HEENT: no jaundice   LUNGS: no SOB   CV: no chest pain GI: no dysphagia, N/V  : no hematuria    MSK: no joint pain   NDOCR: no diabetes or thyroid history  EXAM:   /47 (BP Location: Left arm)   Pulse 73   Temp 98.1 °F (36.7 °C) (Oral)   Resp 16   Ht 5' 5\" (1.651 m)   Wt 157 lb (71.2 kg)   LMP 1995   SpO2 96%   BMI 26.13 kg/m²  - Body mass index is 26.13 kg/m².  GEN: well developed, well nourished, NAD LUNGS: CTA  CARDIO: RRR  GI: good BS's,no masses, HSM or tenderness RECTAL: Exam not done. EXTREM.: no edema NEURO: A + O    ASSESSMENT AND PLAN:   Yudy Lama is a 74 year old female with NV abdominal pain.  Symptoms for 1 month  dyspepsia.  Recent sig abd pain NV 6 days ago.  No hematemesis. Poss perf  with localized free air. Repeat CT scan with poss cholecysto-gastric fistula.  Gallstone in colon?? d .  Pain is better.  Had EGD colon Ct 2020/2021 WNL.  Benign exam.  Rule our perf  fistula  other.    UGI tomorrow.  May neef EGD +/- colon depending on results.  NPO    Total time spent on patient care:  35 minutes    cc: Dr. Post ref. provider found    The patient indicates understanding of these issues and agrees to the plan.    Antonio Lopez MD  9/15/2024  4:32 PM

## 2024-09-15 NOTE — PLAN OF CARE
9/14/24, 2300: Pt VSS, AOx4. Pt up with standby, steady gait, encourage to ambulate halls and she did walk the unit once this evening. Pt on room air; denies EVI/SOB/lightheadedness. Incentive spirometer ordered. Subcutaneous heparin continued, SCDs declined, pt denies leg or calf pain. Pt voids without difficulty. Pt reports last BM was 9/9; Dr rojo, orders to give rectal suppository. Pt agreeable and tolerated suppository well. Pt passing gas and belching, abdomen soft, bowel sounds present x4. Pt denies pain in abdomen at this time, complains of discomfort in nose from NGT. NGT taped at 55 cm, continued to LIS; green/brown drainage noted to cannister in moderate amounts. NPO continued. Fall & safety precautions in place. POC discussed.     9/14/24, 1667: Pt reports urge to have BM, but unsuccessful. Pt reports 5-6/10 back pain she is suggesting possibly from bed. Pain managed per MAR.      9/15/24, 0619: Pt had 1 BM overnight, small/formed/brown. Continues to pass flatus & belch. Pain minimal. VSS.

## 2024-09-15 NOTE — PROGRESS NOTES
[facility]   part of [facility]     Hospitalist Progress Note     Yudy Lama Patient Status:  Inpatient    1950 MRN [id number]   Location [facility] 3NW-A Attending Bradly Fuentes MD   Hosp Day # 4 PCP Robert Kraus MD     Chief Complaint: abd pain    Subjective:     Improving. No complaints     Objective:    Review of Systems:   A comprehensive review of systems was completed; pertinent positive and negatives stated in subjective.    Vital signs:  Temp:  [98 °F (36.7 °C)-98.8 °F (37.1 °C)] 98.1 °F (36.7 °C)  Pulse:  [63-73] 73  Resp:  [16-18] 16  BP: (115-140)/(45-62) 128/47  SpO2:  [94 %-97 %] 96 %    Physical Exam:    General: No acute distress  Respiratory: No wheezes, no rhonchi  Cardiovascular: S1, S2, regular rate and rhythm  Abdomen: Soft, epigastric-tender, non-distended, positive bowel sounds  Neuro: No new focal deficits.   Extremities: No edema      Diagnostic Data:    Labs:  Recent Labs   Lab 24  1355 24  0525 24  0601 24  0626 09/15/24  1123   WBC 14.8* 8.6 5.1  --  8.1   HGB 16.4* 12.8 12.3  --  11.8*   MCV 89.6 91.6 91.8  --  93.3   .0* 371.0 300.0 260.0 254.0       Recent Labs   Lab 24  1355 24  0524  0601 24  0900 09/15/24  1123   * 86 72  --  80   BUN 37* 23 14  --  7*   CREATSERUM 1.16* 0.81 0.67 0.55 0.58   CA 9.7 8.9 8.8  --  9.2   ALB 3.4 3.4 3.4  --   --    * 140 142  --  142   K 3.4* 3.8 3.5  --  3.1*   CL 96* 105 106  --  106   CO2 29.0 29.0 27.0  --  25.0   ALKPHO 64 49* 48*  --   --    AST 19 19 18  --   --    ALT 22 10 10  --   --    BILT 0.8 0.6 0.5  --   --    TP 8.2 6.5 6.2  --   --        Estimated Creatinine Clearance: 74.3 mL/min (based on SCr of 0.58 mg/dL).    No results for input(s): \"TROP\", \"TROPHS\", \"CK\" in the last 168 hours.    No results for input(s): \"PTP\", \"INR\" in the last 168 hours.               Microbiology    Hospital Encounter on 24   1. Urine Culture, Routine      Status: None    Collection Time: 09/11/24  3:15 PM    Specimen: Urine, clean catch   Result Value Ref Range    Urine Culture No Growth at 18-24 hrs. N/A         Imaging: Reviewed in Epic.    Medications:    potassium chloride  40 mEq Intravenous Once    piperacillin-tazobactam  3.375 g Intravenous Q8H    pantoprazole  40 mg Intravenous Q12H    albuterol  2 puff Inhalation QID    heparin  5,000 Units Subcutaneous 2 times per day       Assessment & Plan:      #Perforated gastric ulcer  #Acute vs chronic cholecystitis - no RUQ pain, likely chronic  #gastric-gallblader fistula  #gallstone in colon   -gen sx consulted from ER  -iv zosyn (9/11-)  -iv PPI BID  -pain meds, ivf  -no urgent surgery  -Ngt to decompress  -d/w surgery   -upper GI tomorrow, possible GI for scope     #borderline low BP  #Hyponatremia  #Acute kidney injury - resolved  #Dehydration  -decrease ivf, repeat labs     #Pyuira, asymptomatic   -Ucx no growth. No abx needed     #Thrombocytosis  -2/2 above     #Hypokalemia  -Replace and monitor      #CAD     #Essential hypertension  -PTA: Lisinopril  -PRN hydralazine for now     #Hyperlipidemia  -PTA: Rosuvastatin, fenofibrate     #GERD  -PTA: PPI    #Mild intermittent asthma  -prn albuterol      #ACP  -full code         Bradly Fuentes MD    Supplementary Documentation:     Quality:  DVT Mechanical Prophylaxis:   SCDs,    DVT Pharmacologic Prophylaxis   Medication    heparin (Porcine) 5000 UNIT/ML injection 5,000 Units                Code Status: Full Code  Rosales: No urinary catheter in place  Rosales Duration (in days):   Central line:    ROSA:     Discharge is dependent on: course  At this point Ms. Lama is expected to be discharge to: home    The 21st Century Cures Act makes medical notes like these available to patients in the interest of transparency. Please be advised this is a medical document. Medical documents are intended to carry relevant information, facts as evident, and the clinical opinion  of the practitioner. The medical note is intended as peer to peer communication and may appear blunt or direct. It is written in medical language and may contain abbreviations or verbiage that are unfamiliar.              **Certification      PHYSICIAN Certification of Need for Inpatient Hospitalization - Initial Certification    Patient will require inpatient services that will reasonably be expected to span two midnight's based on the clinical documentation in H+P.   Based on patients current state of illness, I anticipate that, after discharge, patient will require TBD.

## 2024-09-15 NOTE — PROGRESS NOTES
[facility]  Progress Note    Yudy SEBAS Kelby Patient Status:  Inpatient    1950 MRN [id number]   Location [facility] 3NW-A Attending Bradly Fuentes MD   Hosp Day # 4 PCP Robert Kraus MD     Subjective:  No acute events or complaints.  Patient states she is overall feeling well today.  She reports mild ongoing upper abdominal discomfort, though states this is well-controlled.  NG remains to LIS.  She denies any nausea or vomiting.  She had 1 small formed bowel movement overnight following suppository.  She is passing flatus.  Denies any fevers or chills.    Objective/Physical Exam:  General: Alert, orientated x3.  Cooperative.  No apparent distress.  Vital Signs:  Blood pressure 128/47, pulse 73, temperature 98.1 °F (36.7 °C), temperature source Oral, resp. rate 16, height 65\", weight 157 lb (71.2 kg), last menstrual period 1995, SpO2 96%, not currently breastfeeding.  Lungs: No respiratory distress.  Cardiac: Regular rate and rhythm.   Abdomen:  Soft, non distended, mild epigastric tenderness with no rebound or guarding.  No peritoneal signs.   Extremities:  No lower extremity edema noted.        Labs:  Lab Results   Component Value Date    WBC 8.1 09/15/2024    HGB 11.8 09/15/2024    HCT 37.3 09/15/2024    .0 09/15/2024     Lab Results   Component Value Date     09/15/2024    K 3.1 09/15/2024     09/15/2024    CO2 25.0 09/15/2024    BUN 7 09/15/2024    CREATSERUM 0.58 09/15/2024    GLU 80 09/15/2024    CA 9.2 09/15/2024     Lab Results   Component Value Date    INR 1.0 2020    INR 1 2011       I/O last 3 completed shifts:  In: 3831 [I.V.:3831]  Out: 2725 [Urine:1875; Emesis/NG output:850]  I/O this shift:  In: 0   Out: 150 [Emesis/NG output:150]    Assessment  Patient Active Problem List   Diagnosis    Essential hypertension    Mixed hyperlipidemia    Other hemorrhoids    Mild intermittent asthma without complication (HCC)    Colon cancer screening    Eczema     Medicare annual wellness visit, subsequent    Visit for screening mammogram    Fatty liver    Asymptomatic varicose veins of unspecified lower extremity    Calculus of gallbladder without cholecystitis without obstruction    Atherosclerosis of coronary artery    Gastroesophageal reflux disease without esophagitis    Benign hematuria    Atrophic vaginitis    Primary generalized (osteo)arthritis    Irreducible umbilical hernia    Chronic venous insufficiency    History of renal calculi    Post-menopause    Abnormal chest CT    Impaired fasting glucose    Diverticulosis of colon    Hx of colonic polyps    Other irritable bowel syndrome    Well woman exam with routine gynecological exam    Kappa light chain deposition disorder (HCC)    Bronchiectasis without complication (HCC)    Acute gastric ulcer with perforation (HCC)       Contained gastric ulcer perforation versus possible fistula from gallbladder to stomach      Plan:  Repeat CT from yesterday reviewed.  Air noted extending from gastric antrum into the gallbladder, favoring to represent a fistula between the gallbladder and gastric antrum.  Large gallstone also seen in the splenic flexure of the colon.  Case was discussed with radiologist by Dr. Mendenhall  Plan for upper GI study tomorrow.  Will also consult GI for possible scope to better delineate and rule out ulcer  In the interim, patient should remain n.p.o. with NG to LIS  Analgesics and antiemetics as needed  The above listed plan was discussed with the patient in detail and all of her questions were addressed      Vy Tristan PA-C  9/15/2024  1:00 PM     This note was initiated by STEFANIE Mcghee.  The PA saw the patient in conjunction with me. The PA performed a history, exam, and developed the assessment and plan. I agree with the mentioned assessment and plan and have provided further documentation of my own, if necessary.    Jose Mendenhall MD  [facility]

## 2024-09-16 ENCOUNTER — APPOINTMENT (OUTPATIENT)
Dept: GENERAL RADIOLOGY | Facility: HOSPITAL | Age: 74
End: 2024-09-16
Attending: PHYSICIAN ASSISTANT
Payer: MEDICARE

## 2024-09-16 ENCOUNTER — APPOINTMENT (OUTPATIENT)
Dept: GENERAL RADIOLOGY | Facility: HOSPITAL | Age: 74
DRG: 381 | End: 2024-09-16
Attending: PHYSICIAN ASSISTANT
Payer: MEDICARE

## 2024-09-16 LAB
ANION GAP SERPL CALC-SCNC: 11 MMOL/L (ref 0–18)
BASOPHILS # BLD AUTO: 0.04 X10(3) UL (ref 0–0.2)
BASOPHILS NFR BLD AUTO: 0.6 %
BUN BLD-MCNC: 6 MG/DL (ref 9–23)
CALCIUM BLD-MCNC: 8.9 MG/DL (ref 8.7–10.4)
CHLORIDE SERPL-SCNC: 107 MMOL/L (ref 98–112)
CO2 SERPL-SCNC: 26 MMOL/L (ref 21–32)
CREAT BLD-MCNC: 0.56 MG/DL
EGFRCR SERPLBLD CKD-EPI 2021: 95 ML/MIN/1.73M2 (ref 60–?)
EOSINOPHIL # BLD AUTO: 0.15 X10(3) UL (ref 0–0.7)
EOSINOPHIL NFR BLD AUTO: 2.1 %
ERYTHROCYTE [DISTWIDTH] IN BLOOD BY AUTOMATED COUNT: 13.6 %
GLUCOSE BLD-MCNC: 79 MG/DL (ref 70–99)
HCT VFR BLD AUTO: 34.2 %
HGB BLD-MCNC: 11 G/DL
IMM GRANULOCYTES # BLD AUTO: 0.02 X10(3) UL (ref 0–1)
IMM GRANULOCYTES NFR BLD: 0.3 %
LYMPHOCYTES # BLD AUTO: 0.82 X10(3) UL (ref 1–4)
LYMPHOCYTES NFR BLD AUTO: 11.4 %
MCH RBC QN AUTO: 30 PG (ref 26–34)
MCHC RBC AUTO-ENTMCNC: 32.2 G/DL (ref 31–37)
MCV RBC AUTO: 93.2 FL
MONOCYTES # BLD AUTO: 0.61 X10(3) UL (ref 0.1–1)
MONOCYTES NFR BLD AUTO: 8.5 %
NEUTROPHILS # BLD AUTO: 5.56 X10 (3) UL (ref 1.5–7.7)
NEUTROPHILS # BLD AUTO: 5.56 X10(3) UL (ref 1.5–7.7)
NEUTROPHILS NFR BLD AUTO: 77.1 %
OSMOLALITY SERPL CALC.SUM OF ELEC: 295 MOSM/KG (ref 275–295)
PLATELET # BLD AUTO: 221 10(3)UL (ref 150–450)
POTASSIUM SERPL-SCNC: 3.5 MMOL/L (ref 3.5–5.1)
POTASSIUM SERPL-SCNC: 3.5 MMOL/L (ref 3.5–5.1)
RBC # BLD AUTO: 3.67 X10(6)UL
SODIUM SERPL-SCNC: 144 MMOL/L (ref 136–145)
WBC # BLD AUTO: 7.2 X10(3) UL (ref 4–11)

## 2024-09-16 PROCEDURE — 74240 X-RAY XM UPR GI TRC 1CNTRST: CPT | Performed by: PHYSICIAN ASSISTANT

## 2024-09-16 PROCEDURE — 99232 SBSQ HOSP IP/OBS MODERATE 35: CPT | Performed by: HOSPITALIST

## 2024-09-16 NOTE — PROGRESS NOTES
A&Ox4. VSS. RA. .  Telemetry: NSR  GI: Abdomen soft, nondistended. Passing gas, reports BM today.  Denies nausea. NG to LIS draining brown drainage.  : Voids.  Pain controlled with PRN pain medications  Up ad cary.  Diet:NPO  IVF running per order.  All appropriate safety measures in place. All questions and concerns addressed. Will continue to monitor

## 2024-09-16 NOTE — PROGRESS NOTES
Gi addendum      Narrative   PROCEDURE:  XR UGI/ESOPHAGUS SINGLE CONTRAST (CPT=74240)     TECHNIQUE:  Single contrast upper gastrointestinal series was performed in the usual manner.  No  abdominal radiograph was performed.     COMPARISON:  [facility] , CT, CT ABDOMEN+PELVIS(CONTRAST ONLY)(CPT=74177), 9/14/2024, 12:25 PM.     INDICATIONS:  possible gastric ulcer with contained perforation vs now concern for gastric fistula to gallbladder, abdominal pain     PATIENT STATED HISTORY: (As transcribed by Technologist)  Patient states she had a gall stone that has created a fistula and moved towards stomach or small bowel.      FLUOROSCOPY IMAGES OBTAINED:  18  FLUOROSCOPY TIME:  2 minutes 45 seconds  RADIATION DOSE (AIR KERMA PRODUCT):  71667.0uGy/m2  CONTRAST USED:  120mL gastrografin and 200mL thin barium via NG tube     FINDINGS:    Limited single contrast study was performed initially with Gastrografin followed by barium.  Contrast was infused through the pre-existing NG tube.     There is free passage of contrast into the stomach and small bowel.  There is no obvious perforated viscus or significant gastric ulceration identified.  There is no free air identified on the  imaging.  There is no obvious passage of enteric  contrast into the gallbladder fossa where a densely calcified gallstone is noted.                      Impression   CONCLUSION:  No evidence of perforated gastric ulcer or obvious opacification of a cholecysto-enteric fistula.        LOCATION:  [facility]        Dictated by (CST): Juan Manuel Chung MD on 9/16/2024 at 11:43 AM             Reviewed barium study and discussed w/ Dr Mendenhall.  No clear fistula or ulcer on barium    Reviewed w/ pt/spouse as well       --will sign off for now, pls call w/ questions     Dimas Irizarry MD  [facility]

## 2024-09-16 NOTE — PLAN OF CARE
Pt VSS, AOx4. Pt up with standby, steady gait, encourage to ambulate halls and she did walk the unit once so far this evening as well as multiple times in the day. Pt on room air; denies EVI/SOB/lightheadedness. Achieves approximately 7296-1506 on incentive spirometer. Right side of lung/lower lobes small crackles heard during auscultation; pt scheduled inhaler from home sent to pharmacy, verified, and restarted here in hospital. Subcutaneous heparin continued, SCDs declined, pt denies leg or calf pain. Pt voids without difficulty. Pt passing gas and belching, abdomen soft, bowel sounds present x4. No BM this evening but, has had a few in the day; per her report Bms described as small/brown/formed. Pt reports some small bleeding of hemorrhoids at the rectum (peripad continued). Pt reports she experiences hemorrhoids bleeding when she is constipated and/or straining. Pt denies pain in abdomen at this time, denies rectal pain, but complains of discomfort in nose from NGT and some back pain. NGT taped at 55 cm, continued to LIS; green/brown drainage noted to cannister in moderate amounts. NPO continued.  and Tele continued. Fall & safety precautions in place. POC discussed.

## 2024-09-16 NOTE — PROGRESS NOTES
[facility]  Progress Note    Yudy Lama Patient Status:  Inpatient    1950 MRN [id number]   Former[facility] 3NW-A Attending Bradly Fuentes MD   Hosp Day # 5 PCP Robert Kraus MD     Subjective:  Yudy Lama is a(n) 74 year old female.     at bedside    Current complaints:     Denies new issues    Pain better since admit    On chronic omeprazole as outpt for chronic gerd, states controls symptoms. Also on chronic asa qday. Nsaids about 1x/1-2 weeks. Risk pud reviewed    Risk of  egd  including air insufflation, sedation, bleeding, perforation, infection, miss rate or issues with accuracy, etc and possible morbidity/mortalty discussed.  We discussed risk, benefit, alternatives, limitations as well as not having the procedures.  All questions answered, pt/spouse demonstrated understanding.        Current Facility-Administered Medications   Medication Dose Route Frequency    fluticasone-salmeterol (ADVAIR HFA) 230-21 MCG/ACT AERO 2 puff - PT Supplied  2 puff Oral BID    bisacodyl (Dulcolax) 10 MG rectal suppository 10 mg  10 mg Rectal Daily PRN    diphenhydrAMINE (Benadryl) 50 mg/mL  injection 12.5 mg  12.5 mg Intravenous Nightly PRN    Or    diphenhydrAMINE (Benadryl) cap/tab 25 mg  25 mg Oral Q4H PRN    piperacillin-tazobactam (Zosyn) 3.375 g in dextrose 5% 100 mL IVPB-ADDV  3.375 g Intravenous Q8H    pantoprazole (Protonix) 40 mg in sodium chloride 0.9% PF 10 mL IV push  40 mg Intravenous Q12H    HYDROmorphone (Dilaudid) 1 MG/ML injection 0.1 mg  0.1 mg Intravenous Q2H PRN    Or    HYDROmorphone (Dilaudid) 1 MG/ML injection 0.2 mg  0.2 mg Intravenous Q2H PRN    Or    HYDROmorphone (Dilaudid) 1 MG/ML injection 0.4 mg  0.4 mg Intravenous Q2H PRN    sodium chloride 0.9% infusion   Intravenous Continuous    hydrALAzine (Apresoline) 20 mg/mL injection 10 mg  10 mg Intravenous Q6H PRN    albuterol (Ventolin HFA) 108 (90 Base) MCG/ACT inhaler 2 puff  2 puff Inhalation QID     acetaminophen (Tylenol Extra Strength) tab 500 mg  500 mg Oral Q4H PRN    melatonin tab 3 mg  3 mg Oral Nightly PRN    ondansetron (Zofran) 4 MG/2ML injection 4 mg  4 mg Intravenous Q6H PRN    metoclopramide (Reglan) 5 mg/mL injection 5 mg  5 mg Intravenous Q8H PRN    heparin (Porcine) 5000 UNIT/ML injection 5,000 Units  5,000 Units Subcutaneous 2 times per day    polyethylene glycol (PEG 3350) (Miralax) 17 g oral packet 17 g  17 g Oral Daily PRN    sennosides (Senokot) tab 17.2 mg  17.2 mg Oral Nightly PRN    fleet enema (Fleet) rectal enema 133 mL  1 enema Rectal Once PRN        Objective:    /54 (BP Location: Right arm)   Pulse 79   Temp 98.8 °F (37.1 °C) (Oral)   Resp 18   Ht 5' 5\" (1.651 m)   Wt 157 lb (71.2 kg)   LMP 01/01/1995   SpO2 91%   BMI 26.13 kg/m²   General appearance: alert, appears stated age, and cooperative  Lungs: clear to auscultation bilaterally  Heart: reg rate    Abdomen: nl bs. Soft. Minimal/mild tender w/ deep palpation but no shawna/guard/rigidity. Midline lower abd scar (states from cscection).  Extremities: no le edema  Neurologic: Grossly normal           Labs:     Recent Labs   Lab 09/14/24  0626 09/15/24  1123 09/16/24  0630   RBC  --  4.00 3.67*   HGB  --  11.8* 11.0*   HCT  --  37.3 34.2*   MCV  --  93.3 93.2   MCH  --  29.5 30.0   MCHC  --  31.6 32.2   RDW  --  13.3 13.6   NEPRELIM  --  6.48 5.56   WBC  --  8.1 7.2   .0 254.0 221.0       Lab Results   Component Value Date    CREATSERUM 0.56 09/16/2024    BUN 6 09/16/2024     09/16/2024    K 3.5 09/16/2024    K 3.5 09/16/2024     09/16/2024    CO2 26.0 09/16/2024    GLU 79 09/16/2024    CA 8.9 09/16/2024             )             Assessment and Plan:  Patient Active Problem List   Diagnosis    Essential hypertension    Mixed hyperlipidemia    Other hemorrhoids    Mild intermittent asthma without complication (HCC)    Colon cancer screening    Eczema    Medicare annual wellness visit, subsequent    Visit  for screening mammogram    Fatty liver    Asymptomatic varicose veins of unspecified lower extremity    Calculus of gallbladder without cholecystitis without obstruction    Atherosclerosis of coronary artery    Gastroesophageal reflux disease without esophagitis    Benign hematuria    Atrophic vaginitis    Primary generalized (osteo)arthritis    Irreducible umbilical hernia    Chronic venous insufficiency    History of renal calculi    Post-menopause    Abnormal chest CT    Impaired fasting glucose    Diverticulosis of colon    Hx of colonic polyps    Other irritable bowel syndrome    Well woman exam with routine gynecological exam    Kappa light chain deposition disorder (HCC)    Bronchiectasis without complication (HCC)    Acute gastric ulcer with perforation (HCC)    Gastric fistula       1 abd pain  2 n/v  3 abnormal ct of abd    Ct w/ possible perf  with localized free air. Repeat CT scan with poss cholecysto-gastric fistula. Gallstone in colon     Symptoms controlled. On outpt and inpt ppi    --await barium study  --cont ppi for now  --management otherwise per surgery        Pt/ demonstrated understanding of risks of morbidity/mortality if diagnoses and/or treatments were delayed balanced against risks of further investigation and/or treatment.     --/patient demonstrated understanding of the results and recommendations and risks, benefits, limitations, and alternatives to the plan. All of their questions and concerns were addressed and were agreeable to the plan as listed      Dimas Irizarry MD

## 2024-09-16 NOTE — PROGRESS NOTES
[facility]  Progress Note    Yudy Lama Patient Status:  Inpatient    1950 MRN [id number]   Location [facility] 3NW-A Attending Bradly Fuentes MD   Hosp Day # 5 PCP Robert Kraus MD     Subjective:  The patient is resting is resting in bed with family at bedside. She reports improvement in her abdominal pain, but still has some mild discomfort in her epigastrium. She reports abdominal bloating which has worsened over the past few days. She also reports intermittent episodes of nausea, denies vomiting. She reports passing flatus, denies bowel movement.     Objective/Physical Exam:  General: Alert, orientated x3.  Cooperative.  No apparent distress.  Vital Signs:  Blood pressure 136/54, pulse 79, temperature 98.8 °F (37.1 °C), temperature source Oral, resp. rate 18, height 65\", weight 157 lb (71.2 kg), last menstrual period 1995, SpO2 91%, not currently breastfeeding.  HEENT: Normocephalic, atraumatic. No scleral icterus. NG tube in place.  Abdomen:  Soft, distended, slightly tender to palpation in epigastrium, with no rebound or guarding.  No peritoneal signs.    Labs:  CBC:    Lab Results   Component Value Date    WBC 7.2 2024    WBC 8.1 09/15/2024    WBC 5.1 2024     Lab Results   Component Value Date    HEMOGLOBIN 14.9 2016    HEMOGLOBIN 14.3 2016    HEMOGLOBIN 14.9 2016    HGB 11.0 (L) 2024    HGB 11.8 (L) 09/15/2024    HGB 12.3 2024      Lab Results   Component Value Date    .0 2024    .0 09/15/2024    .0 2024     Lab Results   Component Value Date    WBC 7.2 2024    HGB 11.0 2024    HCT 34.2 2024    .0 2024    CREATSERUM 0.56 2024    BUN 6 2024     2024    K 3.5 2024    K 3.5 2024     2024    CO2 26.0 2024    GLU 79 2024    CA 8.9 2024         Assessment/Plan:  Patient Active Problem List   Diagnosis     Essential hypertension    Mixed hyperlipidemia    Other hemorrhoids    Mild intermittent asthma without complication (HCC)    Colon cancer screening    Eczema    Medicare annual wellness visit, subsequent    Visit for screening mammogram    Fatty liver    Asymptomatic varicose veins of unspecified lower extremity    Calculus of gallbladder without cholecystitis without obstruction    Atherosclerosis of coronary artery    Gastroesophageal reflux disease without esophagitis    Benign hematuria    Atrophic vaginitis    Primary generalized (osteo)arthritis    Irreducible umbilical hernia    Chronic venous insufficiency    History of renal calculi    Post-menopause    Abnormal chest CT    Impaired fasting glucose    Diverticulosis of colon    Hx of colonic polyps    Other irritable bowel syndrome    Well woman exam with routine gynecological exam    Kappa light chain deposition disorder (HCC)    Bronchiectasis without complication (HCC)    Acute gastric ulcer with perforation (HCC)    Gastric fistula     Contained gastric ulcer perforation versus possible fistula from gallbladder to stomach     Plan for upper GI today. Further recommendations pending results.  Continue NG tube to LIS. Continue npo.  Continue IV antibiotics.  Continue pain control and antiemetics as needed.  Encourage ambulation and up to chair.  DVT prophylaxis with heparin  GI prophylaxis with Protonix      STEFANIE Romo  9/16/2024  10:16 AM     This note was initiated by Parul Alvarenga PA-C.  The PA saw the patient in conjunction with me. The PA performed a history, exam, and developed the assessment and plan. I agree with the mentioned assessment and plan and have provided further documentation of my own, if necessary.    Jose Mendenhall MD  [facility]

## 2024-09-16 NOTE — DIETARY MALNUTRITION NOTE
[facility]   part of [facility]    NUTRITION ASSESSMENT    Pt meets moderate malnutrition criteria at this time.    CRITERIA FOR MALNUTRITION DIAGNOSIS:  Criteria for non-severe malnutrition diagnosis: acute illness/injury related to wt loss 5% in 1 month and energy intake less than 75% for greater than 7 days      NUTRITION INTERVENTION:    RD nutrition Care Plan- See RD nutrition assessment for additional recommendations  Meal and Snacks - Monitor and encourage adequate PO intake.   Medical Food Supplements - Ensure Plus High Protein Daily  chocolate if diet advances to fulls and Ensure Clear Daily apple if diet advances to clears Rationale/use for oral supplements discussed.      PATIENT STATUS: 09/16/24 74 year old female with contained perforation of gastric ulcer vs possible fistula from gallbladder to stomach. Chart reviewed for NPO status. NG to LIS.  Has not had anything to eat since 9/8 Sunday. Describes a fluctuating appetite for about 1 month PTA- pt experienced early satiety, nausea, and emesis. NPO since admission here on 9/11. Plan for Upper GI today.  Diet to advance as medically feasible. If diet is unable to advance  in next 1-2 days, recommend nutrition support if aggressive care is pursued. Pt denies troubles chewing or swallowing, no dietary related intolerances. RD available to make recommendations.# about a month ago. Down ~13# (7.6%) x 1 month. Will continue to monitor and follow pt nutritional status.     ANTHROPOMETRICS:  Ht: 165.1 cm (5' 5\")  Wt: 71.2 kg (157 lb).   BMI: Body mass index is 26.13 kg/m².  IBW: 56.8 kg  UBW: 170#    WEIGHT HISTORY:   Weight loss: Yes 13# (7.6%) x 1 month    Wt Readings from Last 10 Encounters:   09/11/24 71.2 kg (157 lb)   03/10/22 82.1 kg (181 lb)   01/19/22 82.1 kg (181 lb)   12/08/21 79.8 kg (176 lb)   11/15/21 79.8 kg (176 lb)   11/03/21 78.5 kg (173 lb)   10/15/21 80.2 kg (176 lb 12.8 oz)   09/21/21 79.8 kg (176 lb)   05/05/21 74.4 kg  (164 lb)   02/03/21 71.2 kg (157 lb)        NUTRITION:  Diet:       Procedures    NPO      Food Allergies: No  Cultural/Ethnic/Confucianism Preferences Addressed: Yes    Percent Meals Eaten (last 3 days)       Date/Time Percent Meals Eaten (%)    09/13/24 0808 0 %    09/13/24 1123 0 %    09/13/24 1220 0 %    09/13/24 1718 0 %            GI system review: WNL Last BM: 9/14  Skin and wounds: intact    NUTRITION RELATED PHYSICAL FINDINGS:     1. Body Fat/Muscle Mass: no wasting noted, malnutrition related to PO intake and weight loss     2. Fluid Accumulation: none per RN documentation     NUTRITION PRESCRIPTION: 71.2kg  Calories: 0507-8059 calories/day (25-30 kcal/kg)  Protein:  grams protein/day (1.2-1.5 grams protein per kg)  Fluid: ~1 ml/kcal or per MD discretion    NUTRITION DIAGNOSIS/PROBLEM:  Malnutrition related to altered GI function/GI disorder as evidenced by documented/reported insufficient oral intake and documented/reported unintentional weight loss      MONITOR AND EVALUATE/NUTRITION GOALS:  Weight stable within 1 to 2 lbs during admission - New  Return to PO intake or advance diet in 24-48 hrs - New  Start alternative nutrition in 24-48 hrs if diet is not able to advance- New      MEDICATIONS:  Reviewed    LABS:  Reviewed    Pt is at High nutrition risk    Candy Crowe RD, LDN  Clinical Nutrition  [id number]

## 2024-09-16 NOTE — PROGRESS NOTES
[facility]   part of [facility]     Hospitalist Progress Note     Yudy Lama Patient Status:  Inpatient    1950 MRN [id number]   Location [facility] 3NW-A Attending Bradly Fuentes MD   Hosp Day # 5 PCP Robert Kraus MD     Chief Complaint: abd pain    Subjective:     Improving. No complaints     Objective:    Review of Systems:   A comprehensive review of systems was completed; pertinent positive and negatives stated in subjective.    Vital signs:  Temp:  [98 °F (36.7 °C)-98.8 °F (37.1 °C)] 98.4 °F (36.9 °C)  Pulse:  [71-79] 78  Resp:  [17-18] 18  BP: (110-136)/(46-74) 128/46  SpO2:  [91 %-98 %] 93 %    Physical Exam:    General: No acute distress  Respiratory: No wheezes, no rhonchi  Cardiovascular: S1, S2, regular rate and rhythm  Abdomen: Soft, epigastric-tender, non-distended, positive bowel sounds  Neuro: No new focal deficits.   Extremities: No edema      Diagnostic Data:    Labs:  Recent Labs   Lab 24  1355 24  0525 24  0601 24  0626 09/15/24  1123 24  0630   WBC 14.8* 8.6 5.1  --  8.1 7.2   HGB 16.4* 12.8 12.3  --  11.8* 11.0*   MCV 89.6 91.6 91.8  --  93.3 93.2   .0* 371.0 300.0 260.0 254.0 221.0       Recent Labs   Lab 24  1355 24  0525 24  0601 24  0900 09/15/24  1123 24  0630   * 86 72  --  80 79   BUN 37* 23 14  --  7* 6*   CREATSERUM 1.16* 0.81 0.67 0.55 0.58 0.56   CA 9.7 8.9 8.8  --  9.2 8.9   ALB 3.4 3.4 3.4  --   --   --    * 140 142  --  142 144   K 3.4* 3.8 3.5  --  3.1* 3.5  3.5   CL 96* 105 106  --  106 107   CO2 29.0 29.0 27.0  --  25.0 26.0   ALKPHO 64 49* 48*  --   --   --    AST 19 19 18  --   --   --    ALT 22 10 10  --   --   --    BILT 0.8 0.6 0.5  --   --   --    TP 8.2 6.5 6.2  --   --   --        Estimated Creatinine Clearance: 76.9 mL/min (based on SCr of 0.56 mg/dL).    No results for input(s): \"TROP\", \"TROPHS\", \"CK\" in the last 168 hours.    No results for input(s): \"PTP\",  \"INR\" in the last 168 hours.               Microbiology    Hospital Encounter on 09/11/24   1. Urine Culture, Routine     Status: None    Collection Time: 09/11/24  3:15 PM    Specimen: Urine, clean catch   Result Value Ref Range    Urine Culture No Growth at 18-24 hrs. N/A         Imaging: Reviewed in Epic.    Medications:    fluticasone-salmeterol  2 puff Oral BID    piperacillin-tazobactam  3.375 g Intravenous Q8H    pantoprazole  40 mg Intravenous Q12H    albuterol  2 puff Inhalation QID    heparin  5,000 Units Subcutaneous 2 times per day       Assessment & Plan:      #Perforated gastric ulcer  #Acute vs chronic cholecystitis - no RUQ pain, likely chronic  #cholecysto-gastric fistula  #gallstone in colon   -gen sx consulted from ER  -iv zosyn (9/11-)  -iv PPI BID  -pain meds, ivf  -no urgent surgery  -Ngt to decompress - remove per surgery?  -d/w surgery today. UGI today w/ no leak     #borderline low BP  #Hyponatremia  #Acute kidney injury - resolved  #Dehydration  -decrease ivf, repeat labs     #Pyuira, asymptomatic   -Ucx no growth. No abx needed     #Thrombocytosis  -2/2 above     #Hypokalemia  -Replace and monitor      #CAD     #Essential hypertension  -PTA: Lisinopril  -PRN hydralazine for now     #Hyperlipidemia  -PTA: Rosuvastatin, fenofibrate     #GERD  -PTA: PPI    #Mild intermittent asthma  -prn albuterol      #ACP  -full code         Bradly Fuentes MD    Supplementary Documentation:     Quality:  DVT Mechanical Prophylaxis:   SCDs,    DVT Pharmacologic Prophylaxis   Medication    heparin (Porcine) 5000 UNIT/ML injection 5,000 Units                Code Status: Full Code  Rosales: No urinary catheter in place  Rosales Duration (in days):   Central line:    ROSA:     Discharge is dependent on: course  At this point Ms. Lama is expected to be discharge to: home    The 21st Century Cures Act makes medical notes like these available to patients in the interest of transparency. Please be advised this is a  medical document. Medical documents are intended to carry relevant information, facts as evident, and the clinical opinion of the practitioner. The medical note is intended as peer to peer communication and may appear blunt or direct. It is written in medical language and may contain abbreviations or verbiage that are unfamiliar.     Dietitian Malnutrition Assessment    Evaluation for Malnutrition: Criteria for non-severe malnutrition diagnosis-         acute illness/injury related to Wt loss 5% in 1 month., Energy intake less than 75% for greater than 7 days.         RD Malnutrition Care Plan: See RD nutrition assessment for additional recommendations.    Body Fat/Muscle Mass:          Physician Assessment     Patient has a diagnosis of moderate malnutrition        **Certification      PHYSICIAN Certification of Need for Inpatient Hospitalization - Initial Certification    Patient will require inpatient services that will reasonably be expected to span two midnight's based on the clinical documentation in H+P.   Based on patients current state of illness, I anticipate that, after discharge, patient will require TBD.

## 2024-09-17 PROCEDURE — 99232 SBSQ HOSP IP/OBS MODERATE 35: CPT | Performed by: HOSPITALIST

## 2024-09-17 NOTE — PLAN OF CARE
Patient ambulating independently. VSS. Denies chest/calf pain. Bowel movement this morning. Denies any abdominal pain/nausea at present time. POC discussed with patient, all questions and concerns addressed. All safety measures in place.

## 2024-09-17 NOTE — PROGRESS NOTES
[facility]  Progress Note    Yudy SEBAS Kelby Patient Status:  Inpatient    1950 MRN [id number]   Location [facility] 3NW-A Attending Bradly Fuentes MD   Hosp Day # 6 PCP Robert Kraus MD     Subjective:  The patient was seen and examined at bedside. She denies abdominal pain this morning. She states she tolerated a clear liquid diet without nausea or vomiting yesterday. She is passing flatus and having bowel movements.     Upper GI yesterday with no extravasation of contrast. No evidence of perforated gastric ulcer or obvious opacification of a cholecysto-enteric fistula.     Objective/Physical Exam:  /44 (BP Location: Left arm)   Pulse 82   Temp 98.7 °F (37.1 °C) (Oral)   Resp 18   Ht 65\"   Wt 157 lb (71.2 kg)   LMP 1995   SpO2 90%   BMI 26.13 kg/m²     Intake/Output Summary (Last 24 hours) at 2024 0812  Last data filed at 2024 0601  Gross per 24 hour   Intake 2298 ml   Output 1100 ml   Net 1198 ml         General: Alert, oriented x3. No acute distress.  HEENT: Normocephalic, atraumatic. No scleral icterus.  Pulmonary: No respiratory distress, effort normal.   Abdomen: mildly-distended, with tympany to percussion. Soft, non-tender to palpation. No rebound or guarding. No peritoneal signs.   Extremities: No lower extremity edema. No clubbing or cyanosis.   Skin: Warm, dry. No jaundice.       Labs:      Lab Results   Component Value Date    INR 1.0 2020    INR 1 2011             Assessment:  Patient Active Problem List   Diagnosis    Essential hypertension    Mixed hyperlipidemia    Other hemorrhoids    Mild intermittent asthma without complication (HCC)    Colon cancer screening    Eczema    Medicare annual wellness visit, subsequent    Visit for screening mammogram    Fatty liver    Asymptomatic varicose veins of unspecified lower extremity    Calculus of gallbladder without cholecystitis without obstruction    Atherosclerosis of coronary artery     Gastroesophageal reflux disease without esophagitis    Benign hematuria    Atrophic vaginitis    Primary generalized (osteo)arthritis    Irreducible umbilical hernia    Chronic venous insufficiency    History of renal calculi    Post-menopause    Abnormal chest CT    Impaired fasting glucose    Diverticulosis of colon    Hx of colonic polyps    Other irritable bowel syndrome    Well woman exam with routine gynecological exam    Kappa light chain deposition disorder (HCC)    Bronchiectasis without complication (HCC)    Acute gastric ulcer with perforation (HCC)    Gastric fistula     Contained gastric ulcer perforation    Plan:  Advance to full liquid diet. Advised patient to go slow  Antiemetics and analgesics as needed  Continue IV antibiotics-zosyn  DVT prophylaxis with heparin  GI prophylaxis with protonix BID     My total face time with this patient was 25 minutes. Greater than half of the visit was spent in counseling the patient on the above listed diagnoses and treatment options.     Tayler Gomez PA-C  9/17/2024  8:12 AM    Patient seen and examined, I agree with the documentation above with the following addendum.    Upper GI yesterday did not show a perforation or free extravasation into the peritoneum. No acute events overnight. Feels better. Pain is improved. Tolerating clear liquid diet . Having flatus and diarrhea.   Physical exam:  General: NAD   Abdomen: Soft, mild tenderness at the epigastrium, non distended, no rebound or guarding      Assesment & Plan:  74 year old female admitted with abdominal pain. She has a cholecysto-enteric fistula on CT scan with a gallstone within the lumen of the colon in the splenic flexure. At this time I ruled out a free intraperitoneal perforation. The NG tube was removed and she was started on clear liquid diet. She is tolerating diet and can advance as such. She has no obstructive symptoms from the stone in the colon and this should pass with ease. May  be warranted to image with CT scan one more time to see if this stone had moved or passed prior to discharge.         Jose Mendenhall MD  [facility]

## 2024-09-17 NOTE — PROGRESS NOTES
Pt is alert and oriented, VSS on room air, Tele-NSR,   Pt denies pain, denies nausea, tolerating clears.  IVF infusing, IV Zosyn as scheduled, pt up ad cary, voids adequately.  Poc discussed, call light in reach.

## 2024-09-17 NOTE — PAYOR COMM NOTE
[facility] 9/14 - 9/17  CONTINUED STAY REVIEW    Payor: [employer] MEDICARE ADV PPO  Subscriber #:  [id number]  Authorization Number: [id number]    Admit date: 9/11/24  Admit time:  6:40 PM         MEDICATIONS ADMINISTERED IN LAST 1 DAY:  albuterol (Ventolin HFA) 108 (90 Base) MCG/ACT inhaler 2 puff       Date Action Dose Route User    9/17/2024 1257 Given 2 puff Inhalation Samantha Zamora RN    9/17/2024 0818 Given 2 puff Inhalation Samantha Zamora RN    9/16/2024 2117 Given 2 puff Inhalation Mahad Haywood RN    9/16/2024 1715 Given 2 puff Inhalation Yolande Swanson RN          fluticasone-salmeterol (ADVAIR HFA) 230-21 MCG/ACT AERO 2 puff - PT Supplied       Date Action Dose Route User    9/17/2024 0817 Given 2 puff Oral Samantha Zamora RN    9/16/2024 2117 Given 2 puff Oral Mahad Haywood RN          heparin (Porcine) 5000 UNIT/ML injection 5,000 Units       Date Action Dose Route User    9/17/2024 0817 Given 5,000 Units Subcutaneous (Left Lower Abdomen) Samantha Zamora RN    9/16/2024 2117 Given 5,000 Units Subcutaneous (Left Lower Abdomen) Mahad Haywood RN          pantoprazole (Protonix) 40 mg in sodium chloride 0.9% PF 10 mL IV push       Date Action Dose Route User    9/17/2024 0600 Given 40 mg Intravenous Mahad Haywood RN    9/16/2024 1715 Given 40 mg Intravenous Yolande Swanson RN          piperacillin-tazobactam (Zosyn) 3.375 g in dextrose 5% 100 mL IVPB-ADDV       Date Action Dose Route User    9/17/2024 1257 New Bag 3.375 g Intravenous Samantha Zamora RN    9/17/2024 0600 New Bag 3.375 g Intravenous Mahad Haywood RN    9/16/2024 2117 New Bag 3.375 g Intravenous Mahad Haywood RN          sodium chloride 0.9% infusion       Date Action Dose Route User    9/17/2024 0600 New Bag (none) Intravenous Mahad Haywood RN            Vitals (last day)       Date/Time Temp Pulse Resp BP SpO2 Weight O2 Device O2 Flow Rate (L/min) [facility]    09/17/24 1148 98 °F (36.7  °C) 87 18 108/47 98 % -- None (Room air) 0 L/min KS    09/17/24 1100 -- 87 -- -- -- -- -- -- KS    09/17/24 0831 98.6 °F (37 °C) 80 18 118/51 90 % -- None (Room air) 0 L/min KS    09/17/24 0401 98.7 °F (37.1 °C) 82 18 118/44 90 % -- None (Room air) --     09/16/24 2330 98.9 °F (37.2 °C) 86 18 128/45 90 % -- None (Room air) --     09/16/24 1916 98.2 °F (36.8 °C) 80 18 118/45 92 % -- None (Room air) --     09/16/24 1745 -- 79 -- -- -- -- -- -- AK    09/16/24 1737 98.8 °F (37.1 °C) 70 18 118/49 98 % -- None (Room air) -- AK    09/16/24 1209 98.4 °F (36.9 °C) 78 18 128/46 93 % -- None (Room air) --     09/16/24 0724 98.8 °F (37.1 °C) 79 18 136/54 91 % -- None (Room air) -- AK    09/16/24 0514 98.1 °F (36.7 °C) 74 18 110/53 96 % -- None (Room air) 0 L/min HB         9/14 GEN SURGERY   Subjective:  The patient is resting in bed.  She reports feeling better today.  She reports mild epigastric discomfort.  She does report a short episode of nausea yesterday.  She reports passing flatus, denies having a bowel movement.     Objective/Physical Exam:  General: Alert, orientated x3.  Cooperative.  No apparent distress.  Vital Signs:  Blood pressure 109/43, pulse 56, temperature 97.9 °F (36.6 °C), temperature source Oral, resp. rate 16, height 65\", weight 157 lb (71.2 kg), last menstrual period 01/01/1995, SpO2 96%, not currently breastfeeding.  HEENT: Normocephalic, atraumatic. No scleral icterus.  NG tube in place.  Abdomen:  Soft, non-distended, slightly tender to palpation in epigastrium, with no rebound or guarding.  No peritoneal signs.      Assessment/Plan:    Contained gastric ulcer perforation     Repeat CT with oral contrast today.  Further recommendations pending results.  Continue NPO.  Continue NG tube to low intermittent suction.  Continue IV antibiotics.  Continue pain control and antiemetics as needed.  Encourage ambulation and up to chair.  DVT prophylaxis with heparin.  GI prophylaxis with Protonix.      9/14 HOSPITALIST NOTE    Vital signs:  Temp:  [97.7 °F (36.5 °C)-98.6 °F (37 °C)] 98.3 °F (36.8 °C)  Pulse:  [56-68] 68  Resp:  [16-18] 16  BP: ()/(38-64) 140/45  SpO2:  [94 %-97 %] 97 %     Physical Exam:    General: No acute distress  Respiratory: No wheezes, no rhonchi  Cardiovascular: S1, S2, regular rate and rhythm  Abdomen: Soft, epigastric-tender, non-distended, positive bowel sounds  Neuro: No new focal deficits.   Extremities: No edema        Diagnostic Data:    Labs:         Recent Labs   Lab 09/11/24  1355 09/12/24  0525 09/13/24  0601 09/14/24  0626   WBC 14.8* 8.6 5.1  --    HGB 16.4* 12.8 12.3  --    MCV 89.6 91.6 91.8  --    .0* 371.0 300.0 260.0                Recent Labs   Lab 09/11/24  1355 09/12/24  0525 09/13/24  0601 09/14/24  0900   * 86 72  --    BUN 37* 23 14  --    CREATSERUM 1.16* 0.81 0.67 0.55   CA 9.7 8.9 8.8  --    ALB 3.4 3.4 3.4  --    * 140 142  --    K 3.4* 3.8 3.5  --    CL 96* 105 106  --    CO2 29.0 29.0 27.0  --    ALKPHO 64 49* 48*  --    AST 19 19 18  --    ALT 22 10 10  --    BILT 0.8 0.6 0.5  --         Assessment & Plan:  #Perforated gastric ulcer  #Acute vs chronic cholecystitis - no RUQ pain, likely chronic  -gen sx consulted from ER  -iv zosyn (9/11-)  -iv PPI BID  -pain meds, ivf  -no urgent surgery  -Ngt to decompress  -CT reviewed indep. Possible fistua b/w bowel and GB? Awit gen surg recs. May need surgery      #borderline low BP  #Hyponatremia  #Acute kidney injury - resolved  #Dehydration  -decrease ivf, repeat labs     #Pyuira, asymptomatic   -Ucx no growth. No abx needed     #Thrombocytosis  -2/2 above     #Hypokalemia  -Replace and monitor      #CAD     #Essential hypertension  -PTA: Lisinopril  -PRN hydralazine for now     #Hyperlipidemia  -PTA: Rosuvastatin, fenofibrate     #GERD  -PTA: PPI    #Mild intermittent asthma  -prn albuterol      #ACP  -full code     9/15 GEN SURGERY NOTE    Subjective:  No acute events or complaints.   Patient states she is overall feeling well today.  She reports mild ongoing upper abdominal discomfort, though states this is well-controlled.  NG remains to LIS.  She denies any nausea or vomiting.  She had 1 small formed bowel movement overnight following suppository.  She is passing flatus.  Denies any fevers or chills.     Objective/Physical Exam:  General: Alert, orientated x3.  Cooperative.  No apparent distress.  Vital Signs:  Blood pressure 128/47, pulse 73, temperature 98.1 °F (36.7 °C), temperature source Oral, resp. rate 16, height 65\", weight 157 lb (71.2 kg)      Assessment    Contained gastric ulcer perforation versus possible fistula from gallbladder to stomach        Plan:  Repeat CT from yesterday reviewed.  Air noted extending from gastric antrum into the gallbladder, favoring to represent a fistula between the gallbladder and gastric antrum.  Large gallstone also seen in the splenic flexure of the colon.  Case was discussed with radiologist by Dr. Mendenhall  Plan for upper GI study tomorrow.  Will also consult GI for possible scope to better delineate and rule out ulcer  In the interim, patient should remain n.p.o. with NG to LIS  Analgesics and antiemetics as needed  The above listed plan was discussed with the patient in detail and all of her questions were addressed      9/15 HOSPITALIST NOTE    Subjective:  Improving. No complaints            Objective:  Review of Systems:   A comprehensive review of systems was completed; pertinent positive and negatives stated in subjective.     Vital signs:  Temp:  [98 °F (36.7 °C)-98.8 °F (37.1 °C)] 98.1 °F (36.7 °C)  Pulse:  [63-73] 73  Resp:  [16-18] 16  BP: (115-140)/(45-62) 128/47  SpO2:  [94 %-97 %] 96 %     Physical Exam:    General: No acute distress  Respiratory: No wheezes, no rhonchi  Cardiovascular: S1, S2, regular rate and rhythm  Abdomen: Soft, epigastric-tender, non-distended, positive bowel sounds  Neuro: No new focal deficits.    Extremities: No edema        Diagnostic Data:    Labs:          Recent Labs   Lab 09/11/24  1355 09/12/24  0525 09/13/24  0601 09/14/24  0626 09/15/24  1123   WBC 14.8* 8.6 5.1  --  8.1   HGB 16.4* 12.8 12.3  --  11.8*   MCV 89.6 91.6 91.8  --  93.3   .0* 371.0 300.0 260.0 254.0                 Recent Labs   Lab 09/11/24  1355 09/12/24  0525 09/13/24  0601 09/14/24  0900 09/15/24  1123   * 86 72  --  80   BUN 37* 23 14  --  7*   CREATSERUM 1.16* 0.81 0.67 0.55 0.58   CA 9.7 8.9 8.8  --  9.2   ALB 3.4 3.4 3.4  --   --    * 140 142  --  142   K 3.4* 3.8 3.5  --  3.1*   CL 96* 105 106  --  106   CO2 29.0 29.0 27.0  --  25.0   ALKPHO 64 49* 48*  --   --    AST 19 19 18  --   --    ALT 22 10 10  --   --    BILT 0.8 0.6 0.5  --   --    TP 8.2 6.5 6.2  --   --         Assessment & Plan:  #Perforated gastric ulcer  #Acute vs chronic cholecystitis - no RUQ pain, likely chronic  #gastric-gallblader fistula  #gallstone in colon   -gen sx consulted from ER  -iv zosyn (9/11-)  -iv PPI BID  -pain meds, ivf  -no urgent surgery  -Ngt to decompress  -d/w surgery   -upper GI tomorrow, possible GI for scope     #borderline low BP  #Hyponatremia  #Acute kidney injury - resolved  #Dehydration  -decrease ivf, repeat labs     #Pyuira, asymptomatic   -Ucx no growth. No abx needed     #Thrombocytosis  -2/2 above     #Hypokalemia  -Replace and monitor      #CAD     #Essential hypertension  -PTA: Lisinopril  -PRN hydralazine for now     #Hyperlipidemia  -PTA: Rosuvastatin, fenofibrate     #GERD  -PTA: PPI    #Mild intermittent asthma  -prn albuterol      #ACP  -full code     9/15 GI CONSULT NOTE    Yudy Lama is a 74 year old female for NV abdominal pain consult.  Has had symptoms for 1 month with dyspepsia.  Sig pain NV 6 days ago.  Had recurrent emesis no blood.  Had abd pain.  CT thick antrum with fluid collection.  Perf ?  Repeat CT scan yesterday.  Cholecysto-gastric fistula thick antrum, gallstone in  colon?  Pain is better. Denies rectal bleeding, diarrhea, constipation, or weight loss. [place]d pain in [place] 2020 EGD colon CT then WNL.   EXAM:   /47 (BP Location: Left arm)   Pulse 73   Temp 98.1 °F (36.7 °C) (Oral)   Resp 16   Ht 5' 5\" (1.651 m)   Wt 157 lb (71.2 kg)   LMP 01/01/1995   SpO2 96%   BMI 26.13 kg/m²  - Body mass index is 26.13 kg/m².  GEN: well developed, well nourished, NAD LUNGS: CTA  CARDIO: RRR  GI: good BS's,no masses, HSM or tenderness RECTAL: Exam not done. EXTREM.: no edema NEURO: A + O     ASSESSMENT AND PLAN:   Yudy Lama is a 74 year old female with NV abdominal pain.  Symptoms for 1 month dyspepsia.  Recent sig abd pain NV 6 days ago.  No hematemesis. Poss perf  with localized free air. Repeat CT scan with poss cholecysto-gastric fistula.  Gallstone in colon?? d .  Pain is better.  Had EGD colon Ct 2020/2021 WNL.  Benign exam.  Rule our perf  fistula  other.     UGI tomorrow.  May neef EGD +/- colon depending on results.  NPO    9/16 GEN SURGERY NOTE    Subjective:  The patient is resting is resting in bed with family at bedside. She reports improvement in her abdominal pain, but still has some mild discomfort in her epigastrium. She reports abdominal bloating which has worsened over the past few days. She also reports intermittent episodes of nausea, denies vomiting. She reports passing flatus, denies bowel movement.      Objective/Physical Exam:  General: Alert, orientated x3.  Cooperative.  No apparent distress.  Vital Signs:  Blood pressure 136/54, pulse 79, temperature 98.8 °F (37.1 °C), temperature source Oral, resp. rate 18, height 65\", weight 157 lb (71.2 kg)      Assessment/Plan:    Contained gastric ulcer perforation versus possible fistula from gallbladder to stomach      Plan for upper GI today. Further recommendations pending results.  Continue NG tube to LIS. Continue npo.  Continue IV antibiotics.  Continue pain control and antiemetics as  needed.  Encourage ambulation and up to chair.  DVT prophylaxis with heparin  GI prophylaxis with Protonix      9/16 GI NOTE  Pain better since admit     On chronic omeprazole as outpt for chronic gerd, states controls symptoms. Also on chronic asa qday. Nsaids about 1x/1-2 weeks. Risk pud reviewed         Assessment and Plan:     1 abd pain  2 n/v  3 abnormal ct of abd     Ct w/ possible perf  with localized free air. Repeat CT scan with poss cholecysto-gastric fistula. Gallstone in colon      Symptoms controlled. On outpt and inpt ppi     --await barium study  --cont ppi for now  --management otherwise per surgery    Gi addendum       FINDINGS:    Limited single contrast study was performed initially with Gastrografin followed by barium.  Contrast was infused through the pre-existing NG tube.     There is free passage of contrast into the stomach and small bowel.  There is no obvious perforated viscus or significant gastric ulceration identified.  There is no free air identified on the  imaging.  There is no obvious passage of enteric  contrast into the gallbladder fossa where a densely calcified gallstone is noted.                      Impression   CONCLUSION:  No evidence of perforated gastric ulcer or obvious opacification of a cholecysto-enteric fistula.        9/16 HOSPITALIST NOTE    Subjective:  Improving. No complaints            Objective:  Review of Systems:   A comprehensive review of systems was completed; pertinent positive and negatives stated in subjective.     Vital signs:  Temp:  [98 °F (36.7 °C)-98.8 °F (37.1 °C)] 98.4 °F (36.9 °C)  Pulse:  [71-79] 78  Resp:  [17-18] 18  BP: (110-136)/(46-74) 128/46  SpO2:  [91 %-98 %] 93 %     Physical Exam:    General: No acute distress  Respiratory: No wheezes, no rhonchi  Cardiovascular: S1, S2, regular rate and rhythm  Abdomen: Soft, epigastric-tender, non-distended, positive bowel sounds  Neuro: No new focal deficits.   Extremities: No edema         Diagnostic Data:    Labs:           Recent Labs   Lab 09/11/24  1355 09/12/24  0525 09/13/24  0601 09/14/24  0626 09/15/24  1123 09/16/24  0630   WBC 14.8* 8.6 5.1  --  8.1 7.2   HGB 16.4* 12.8 12.3  --  11.8* 11.0*   MCV 89.6 91.6 91.8  --  93.3 93.2   .0* 371.0 300.0 260.0 254.0 221.0                  Recent Labs   Lab 09/11/24  1355 09/12/24  0525 09/13/24  0601 09/14/24  0900 09/15/24  1123 09/16/24  0630   * 86 72  --  80 79   BUN 37* 23 14  --  7* 6*   CREATSERUM 1.16* 0.81 0.67 0.55 0.58 0.56   CA 9.7 8.9 8.8  --  9.2 8.9   ALB 3.4 3.4 3.4  --   --   --    * 140 142  --  142 144   K 3.4* 3.8 3.5  --  3.1* 3.5  3.5   CL 96* 105 106  --  106 107   CO2 29.0 29.0 27.0  --  25.0 26.0   ALKPHO 64 49* 48*  --   --   --    AST 19 19 18  --   --   --    ALT 22 10 10  --   --   --    BILT 0.8 0.6 0.5  --   --   --    TP 8.2 6.5 6.2  --   --   --             Assessment & Plan:  #Perforated gastric ulcer  #Acute vs chronic cholecystitis - no RUQ pain, likely chronic  #cholecysto-gastric fistula  #gallstone in colon   -gen sx consulted from ER  -iv zosyn (9/11-)  -iv PPI BID  -pain meds, ivf  -no urgent surgery  -Ngt to decompress - remove per surgery?  -d/w surgery today. UGI today w/ no leak     #borderline low BP  #Hyponatremia  #Acute kidney injury - resolved  #Dehydration  -decrease ivf, repeat labs     #Pyuira, asymptomatic   -Ucx no growth. No abx needed     #Thrombocytosis  -2/2 above     #Hypokalemia  -Replace and monitor      #CAD     #Essential hypertension  -PTA: Lisinopril  -PRN hydralazine for now     #Hyperlipidemia  -PTA: Rosuvastatin, fenofibrate     #GERD  -PTA: PPI    #Mild intermittent asthma  -prn albuterol      #ACP  -full code            Cigarettes

## 2024-09-18 ENCOUNTER — APPOINTMENT (OUTPATIENT)
Dept: CT IMAGING | Facility: HOSPITAL | Age: 74
End: 2024-09-18
Attending: PHYSICIAN ASSISTANT
Payer: MEDICARE

## 2024-09-18 ENCOUNTER — APPOINTMENT (OUTPATIENT)
Dept: CT IMAGING | Facility: HOSPITAL | Age: 74
DRG: 381 | End: 2024-09-18
Attending: PHYSICIAN ASSISTANT
Payer: MEDICARE

## 2024-09-18 LAB
ANION GAP SERPL CALC-SCNC: 6 MMOL/L (ref 0–18)
BASOPHILS # BLD AUTO: 0.03 X10(3) UL (ref 0–0.2)
BASOPHILS NFR BLD AUTO: 0.5 %
BUN BLD-MCNC: <5 MG/DL (ref 9–23)
CALCIUM BLD-MCNC: 8.8 MG/DL (ref 8.7–10.4)
CHLORIDE SERPL-SCNC: 110 MMOL/L (ref 98–112)
CO2 SERPL-SCNC: 29 MMOL/L (ref 21–32)
CREAT BLD-MCNC: 0.45 MG/DL
EGFRCR SERPLBLD CKD-EPI 2021: 100 ML/MIN/1.73M2 (ref 60–?)
EOSINOPHIL # BLD AUTO: 0.32 X10(3) UL (ref 0–0.7)
EOSINOPHIL NFR BLD AUTO: 5.4 %
ERYTHROCYTE [DISTWIDTH] IN BLOOD BY AUTOMATED COUNT: 14.2 %
GLUCOSE BLD-MCNC: 101 MG/DL (ref 70–99)
HCT VFR BLD AUTO: 34.4 %
HGB BLD-MCNC: 10.9 G/DL
IMM GRANULOCYTES # BLD AUTO: 0.02 X10(3) UL (ref 0–1)
IMM GRANULOCYTES NFR BLD: 0.3 %
LYMPHOCYTES # BLD AUTO: 0.74 X10(3) UL (ref 1–4)
LYMPHOCYTES NFR BLD AUTO: 12.5 %
MAGNESIUM SERPL-MCNC: 1.4 MG/DL (ref 1.6–2.6)
MCH RBC QN AUTO: 29.6 PG (ref 26–34)
MCHC RBC AUTO-ENTMCNC: 31.7 G/DL (ref 31–37)
MCV RBC AUTO: 93.5 FL
MONOCYTES # BLD AUTO: 0.55 X10(3) UL (ref 0.1–1)
MONOCYTES NFR BLD AUTO: 9.3 %
NEUTROPHILS # BLD AUTO: 4.27 X10 (3) UL (ref 1.5–7.7)
NEUTROPHILS # BLD AUTO: 4.27 X10(3) UL (ref 1.5–7.7)
NEUTROPHILS NFR BLD AUTO: 72 %
PLATELET # BLD AUTO: 221 10(3)UL (ref 150–450)
POTASSIUM SERPL-SCNC: 2.9 MMOL/L (ref 3.5–5.1)
POTASSIUM SERPL-SCNC: 3.4 MMOL/L (ref 3.5–5.1)
RBC # BLD AUTO: 3.68 X10(6)UL
SODIUM SERPL-SCNC: 145 MMOL/L (ref 136–145)
WBC # BLD AUTO: 5.9 X10(3) UL (ref 4–11)

## 2024-09-18 PROCEDURE — 99232 SBSQ HOSP IP/OBS MODERATE 35: CPT | Performed by: INTERNAL MEDICINE

## 2024-09-18 PROCEDURE — 74176 CT ABD & PELVIS W/O CONTRAST: CPT | Performed by: PHYSICIAN ASSISTANT

## 2024-09-18 RX ORDER — LISINOPRIL 10 MG/1
10 TABLET ORAL DAILY
Status: DISCONTINUED | OUTPATIENT
Start: 2024-09-18 | End: 2024-09-19

## 2024-09-18 RX ORDER — POTASSIUM CHLORIDE 1500 MG/1
40 TABLET, EXTENDED RELEASE ORAL EVERY 4 HOURS
Status: COMPLETED | OUTPATIENT
Start: 2024-09-18 | End: 2024-09-18

## 2024-09-18 RX ORDER — POTASSIUM CHLORIDE 1500 MG/1
40 TABLET, EXTENDED RELEASE ORAL ONCE
Status: COMPLETED | OUTPATIENT
Start: 2024-09-18 | End: 2024-09-18

## 2024-09-18 RX ORDER — ROSUVASTATIN CALCIUM 5 MG/1
5 TABLET, COATED ORAL NIGHTLY
Status: DISCONTINUED | OUTPATIENT
Start: 2024-09-18 | End: 2024-09-19

## 2024-09-18 NOTE — PROGRESS NOTES
Alert & oriented x4. VSS on room air. IS encouraged. Voids. Tolerating full liquid diet without nausea. Ambulates independently. Encouraged to sit in chair and walk in halls. Denies chest pain/SOB. Denies pain. Patient updated on plan of care. Questions and concerns addressed. Frequent rounding performed

## 2024-09-18 NOTE — PROGRESS NOTES
[facility]   part of [facility]     Hospitalist Progress Note     Yudy Lmaa Patient Status:  Inpatient    1950 MRN [id number]   Location [facility] 3NW-A Attending Bradly Fuentes MD   Hosp Day # 7 PCP Robert Kraus MD     Chief Complaint: abd pain    Subjective:     Feels well, tolerated liquid diet without issue     Objective:    Review of Systems:   A comprehensive review of systems was completed; pertinent positive and negatives stated in subjective.    Vital signs:  Temp:  [97.8 °F (36.6 °C)-98.6 °F (37 °C)] 98.6 °F (37 °C)  Pulse:  [72-78] 74  Resp:  [16-18] 18  BP: (110-130)/(45-54) 130/54  SpO2:  [90 %-98 %] 93 %    Physical Exam:    General: No acute distress  Respiratory: No wheezes, no rhonchi  Cardiovascular: S1, S2, regular rate and rhythm  Abdomen: Soft, nontender   Neuro: No new focal deficits.   Extremities: No edema      Diagnostic Data:    Labs:  Recent Labs   Lab 24  0525 24  0601 24  0626 09/15/24  1123 24  0630 24  0536   WBC 8.6 5.1  --  8.1 7.2 5.9   HGB 12.8 12.3  --  11.8* 11.0* 10.9*   MCV 91.6 91.8  --  93.3 93.2 93.5   .0 300.0 260.0 254.0 221.0 221.0       Recent Labs   Lab 24  0525 24  0601 24  0900 09/15/24  1123 24  0630 24  0536   GLU 86 72  --  80 79 101*   BUN 23 14  --  7* 6* <5*   CREATSERUM 0.81 0.67   < > 0.58 0.56 0.45*   CA 8.9 8.8  --  9.2 8.9 8.8   ALB 3.4 3.4  --   --   --   --     142  --  142 144 145   K 3.8 3.5  --  3.1* 3.5  3.5 2.9*    106  --  106 107 110   CO2 29.0 27.0  --  25.0 26.0 29.0   ALKPHO 49* 48*  --   --   --   --    AST 19 18  --   --   --   --    ALT 10 10  --   --   --   --    BILT 0.6 0.5  --   --   --   --    TP 6.5 6.2  --   --   --   --     < > = values in this interval not displayed.       Estimated Creatinine Clearance: 95.7 mL/min (A) (based on SCr of 0.45 mg/dL (L)).    No results for input(s): \"TROP\", \"TROPHS\", \"CK\" in the last 168  hours.    No results for input(s): \"PTP\", \"INR\" in the last 168 hours.               Microbiology    Hospital Encounter on 09/11/24   1. Urine Culture, Routine     Status: None    Collection Time: 09/11/24  3:15 PM    Specimen: Urine, clean catch   Result Value Ref Range    Urine Culture No Growth at 18-24 hrs. N/A         Imaging: Reviewed in Epic.    Medications:    fluticasone-salmeterol  2 puff Oral BID    piperacillin-tazobactam  3.375 g Intravenous Q8H    pantoprazole  40 mg Intravenous Q12H    albuterol  2 puff Inhalation QID    heparin  5,000 Units Subcutaneous 2 times per day       Assessment & Plan:      #Perforated gastric ulcer  #Acute vs chronic cholecystitis - no RUQ pain, likely chronic  #cholecysto-gastric fistula  -diet advancement per surgery  -iv zosyn (9/11-)  -iv PPI BID  -UGI w/ no leak     #DEBORAH  -resolved      #Thrombocytosis  -resolved     #Hypokalemia  -Replace and monitor      #CAD     #Essential hypertension  -PTA: Lisinopril  -hold Chlorthalidone      #Hyperlipidemia  -PTA: Rosuvastatin, fenofibrate     #GERD  -PTA: PPI    #Mild intermittent asthma  -prn albuterol      POCL  Discharge home today or tomorrow if tolerating diet and okay with general surgery     Quality:  DVT Mechanical Prophylaxis:   SCDs,    DVT Pharmacologic Prophylaxis   Medication    heparin (Porcine) 5000 UNIT/ML injection 5,000 Units                Code Status: Full Code  Rosales: No urinary catheter in place  Rosales Duration (in days):   Central line:    ROSA: 9/18/2024    Discharge is dependent on: course  At this point Ms. Lama is expected to be discharge to: home    The 21st Century Cures Act makes medical notes like these available to patients in the interest of transparency. Please be advised this is a medical document. Medical documents are intended to carry relevant information, facts as evident, and the clinical opinion of the practitioner. The medical note is intended as peer to peer communication and may  appear blunt or direct. It is written in medical language and may contain abbreviations or verbiage that are unfamiliar.     Dietitian Malnutrition Assessment    Evaluation for Malnutrition: Criteria for non-severe malnutrition diagnosis-         acute illness/injury related to Wt loss 5% in 1 month., Energy intake less than 75% for greater than 7 days.         RD Malnutrition Care Plan: See RD nutrition assessment for additional recommendations.    Body Fat/Muscle Mass:          Physician Assessment     Patient has a diagnosis of moderate malnutrition        **Certification      PHYSICIAN Certification of Need for Inpatient Hospitalization - Initial Certification    Patient will require inpatient services that will reasonably be expected to span two midnight's based on the clinical documentation in H+P.   Based on patients current state of illness, I anticipate that, after discharge, patient will require TBD.

## 2024-09-18 NOTE — PLAN OF CARE
Upon assessment pt A&Ox4.  VSS on room air.  Nsr on tele.  Denies chest pain, sob, nausea or emesis.  Abdomen soft, round, tender.  Passing gas. Voids freely in adequate amounts. Tolerating full liquid diet. Ivf and abx running per order.  Heparin for dvt prophylaxis. Declines SCDs, denies calf pain or tenderness. Pt updated on poc. Call light within reach.     13beats of vtach reported, MD notified, awaiting am labs.     0650:Critical lab reported.  K:2.9.  Treating per protocol.  MD notified

## 2024-09-18 NOTE — PROGRESS NOTES
[facility]   part of [facility]     Hospitalist Progress Note     Yudy Lama Patient Status:  Inpatient    1950 MRN [id number]   Location [facility] 3N-A Attending Bradly Fuentes MD   Hosp Day # 6 PCP Robert Kraus MD     Chief Complaint: abd pain    Subjective:     Improving. No complaints     Objective:    Review of Systems:   A comprehensive review of systems was completed; pertinent positive and negatives stated in subjective.    Vital signs:  Temp:  [97.8 °F (36.6 °C)-98.9 °F (37.2 °C)] 97.8 °F (36.6 °C)  Pulse:  [78-87] 78  Resp:  [18] 18  BP: (108-128)/(44-51) 110/45  SpO2:  [90 %-98 %] 98 %    Physical Exam:    General: No acute distress  Respiratory: No wheezes, no rhonchi  Cardiovascular: S1, S2, regular rate and rhythm  Abdomen: Soft, epigastric-tender, non-distended, positive bowel sounds  Neuro: No new focal deficits.   Extremities: No edema      Diagnostic Data:    Labs:  Recent Labs   Lab 24  1355 24  0525 24  0601 24  0626 09/15/24  1123 24  0630   WBC 14.8* 8.6 5.1  --  8.1 7.2   HGB 16.4* 12.8 12.3  --  11.8* 11.0*   MCV 89.6 91.6 91.8  --  93.3 93.2   .0* 371.0 300.0 260.0 254.0 221.0       Recent Labs   Lab 24  1355 24  0525 24  0601 24  0900 09/15/24  1123 24  0630   * 86 72  --  80 79   BUN 37* 23 14  --  7* 6*   CREATSERUM 1.16* 0.81 0.67 0.55 0.58 0.56   CA 9.7 8.9 8.8  --  9.2 8.9   ALB 3.4 3.4 3.4  --   --   --    * 140 142  --  142 144   K 3.4* 3.8 3.5  --  3.1* 3.5  3.5   CL 96* 105 106  --  106 107   CO2 29.0 29.0 27.0  --  25.0 26.0   ALKPHO 64 49* 48*  --   --   --    AST 19 19 18  --   --   --    ALT 22 10 10  --   --   --    BILT 0.8 0.6 0.5  --   --   --    TP 8.2 6.5 6.2  --   --   --        Estimated Creatinine Clearance: 76.9 mL/min (based on SCr of 0.56 mg/dL).    No results for input(s): \"TROP\", \"TROPHS\", \"CK\" in the last 168 hours.    No results for input(s): \"PTP\",  \"INR\" in the last 168 hours.               Microbiology    Hospital Encounter on 09/11/24   1. Urine Culture, Routine     Status: None    Collection Time: 09/11/24  3:15 PM    Specimen: Urine, clean catch   Result Value Ref Range    Urine Culture No Growth at 18-24 hrs. N/A         Imaging: Reviewed in Epic.    Medications:    fluticasone-salmeterol  2 puff Oral BID    piperacillin-tazobactam  3.375 g Intravenous Q8H    pantoprazole  40 mg Intravenous Q12H    albuterol  2 puff Inhalation QID    heparin  5,000 Units Subcutaneous 2 times per day       Assessment & Plan:      #Perforated gastric ulcer  #Acute vs chronic cholecystitis - no RUQ pain, likely chronic  #cholecysto-gastric fistula  #gallstone in colon   -gen sx consulted from ER  -iv zosyn (9/11-)  -iv PPI BID  -pain meds, ivf  -no urgent surgery  -Ngt removed. Tolerating FLD so far  -UGI w/ no leak  -CT tomorrow     #borderline low BP  #Hyponatremia  #Acute kidney injury - resolved  #Dehydration  -decrease ivf, repeat labs. Having some loose stools, keep IVF for now     #Pyuira, asymptomatic   -Ucx no growth. No abx needed     #Thrombocytosis  -2/2 above     #Hypokalemia  -Replace and monitor      #CAD     #Essential hypertension  -PTA: Lisinopril  -PRN hydralazine for now     #Hyperlipidemia  -PTA: Rosuvastatin, fenofibrate     #GERD  -PTA: PPI    #Mild intermittent asthma  -prn albuterol      #ACP  -full code         Bradly Fuentes MD    Supplementary Documentation:     Quality:  DVT Mechanical Prophylaxis:   SCDs,    DVT Pharmacologic Prophylaxis   Medication    heparin (Porcine) 5000 UNIT/ML injection 5,000 Units                Code Status: Full Code  Rosales: No urinary catheter in place  Rosales Duration (in days):   Central line:    ROSA: 9/18/2024    Discharge is dependent on: course  At this point Ms. Lama is expected to be discharge to: home    The 21st Century Cures Act makes medical notes like these available to patients in the interest of  transparency. Please be advised this is a medical document. Medical documents are intended to carry relevant information, facts as evident, and the clinical opinion of the practitioner. The medical note is intended as peer to peer communication and may appear blunt or direct. It is written in medical language and may contain abbreviations or verbiage that are unfamiliar.     Dietitian Malnutrition Assessment    Evaluation for Malnutrition: Criteria for non-severe malnutrition diagnosis-         acute illness/injury related to Wt loss 5% in 1 month., Energy intake less than 75% for greater than 7 days.         RD Malnutrition Care Plan: See RD nutrition assessment for additional recommendations.    Body Fat/Muscle Mass:          Physician Assessment     Patient has a diagnosis of moderate malnutrition        **Certification      PHYSICIAN Certification of Need for Inpatient Hospitalization - Initial Certification    Patient will require inpatient services that will reasonably be expected to span two midnight's based on the clinical documentation in H+P.   Based on patients current state of illness, I anticipate that, after discharge, patient will require TBD.

## 2024-09-18 NOTE — PROGRESS NOTES
[facility]  Progress Note    Yudy Lama Patient Status:  Inpatient    1950 MRN [id number]   Location [facility] 3NW-A Attending Michel Denis,    Hosp Day # 7 PCP Robert Kraus MD     Subjective:  The patient was seen and examined at bedside.  She denies abdominal pain.  She states she is tolerating a full liquid diet.  She denies nausea or vomiting.  She is passing flatus and having bowel movements.    Objective/Physical Exam:  /51 (BP Location: Right arm)   Pulse 76   Temp 97.8 °F (36.6 °C) (Oral)   Resp 18   Ht 65\"   Wt 157 lb (71.2 kg)   LMP 1995   SpO2 93%   BMI 26.13 kg/m²     Intake/Output Summary (Last 24 hours) at 2024 1217  Last data filed at 2024 0856  Gross per 24 hour   Intake 3429 ml   Output 0 ml   Net 3429 ml         General: Alert, oriented x3. No acute distress.  HEENT: Normocephalic, atraumatic. No scleral icterus.  Pulmonary: No respiratory distress, effort normal.   Abdomen: Non-distended, without tympany to percussion. Soft, non-tender to palpation. No rebound or guarding. No peritoneal signs.  Reducible ventral hernia.  Extremities: No lower extremity edema. No clubbing or cyanosis.   Skin: Warm, dry. No jaundice.       Labs:  Lab Results   Component Value Date    WBC 5.9 2024    HGB 10.9 2024    HCT 34.4 2024    .0 2024      Lab Results   Component Value Date    INR 1.0 2020    INR 1 2011     Lab Results   Component Value Date     2024    K 2.9 2024     2024    CO2 29.0 2024    BUN <5 2024    CREATSERUM 0.45 2024     2024    CA 8.8 2024          Assessment:  Patient Active Problem List   Diagnosis    Essential hypertension    Mixed hyperlipidemia    Other hemorrhoids    Mild intermittent asthma without complication (HCC)    Colon cancer screening    Eczema    Medicare annual wellness visit, subsequent    Visit for screening  mammogram    Fatty liver    Asymptomatic varicose veins of unspecified lower extremity    Calculus of gallbladder without cholecystitis without obstruction    Atherosclerosis of coronary artery    Gastroesophageal reflux disease without esophagitis    Benign hematuria    Atrophic vaginitis    Primary generalized (osteo)arthritis    Irreducible umbilical hernia    Chronic venous insufficiency    History of renal calculi    Post-menopause    Abnormal chest CT    Impaired fasting glucose    Diverticulosis of colon    Hx of colonic polyps    Other irritable bowel syndrome    Well woman exam with routine gynecological exam    Kappa light chain deposition disorder (HCC)    Bronchiectasis without complication (HCC)    Acute gastric ulcer with perforation (HCC)    Gastric fistula     Contained gastric ulcer perforation    Plan:  Anticipate discharge home in the next 24 hours pending the patient's ability to tolerate a diet  Advance to soft diet  Antiemetics and analgesics as needed  Continue IV antibiotics-Zosyn  DVT prophylaxis with heparin  GI prophylaxis with Protonix twice daily  Follow-up with [facility] and gastroenterology in the next 2-3 weeks as an outpatient      My total face time with this patient was 25 minutes. Greater than half of the visit was spent in counseling the patient on the above listed diagnoses and treatment options.     Tayler Gomez PA-C  9/18/2024  12:17 PM      Patient seen and examined, I agree with the documentation above with the following addendum.    Feels well. No abdominal pain. No nausea or vomiting. Having bms.     Physical exam:  General: nad  Abdomen: soft, non tender, non distended, no rebound or guarding      Assesment & Plan:  74 year old female who is here with cholecysto-enteric fistula  This is chronic and now asymptomatic. Will observe as attempting cholecystectomy in this case carries very high risk of bile duct injury.   As for the gallstone within the  colon. It is not causing an obstruction. This should continue to move through the colon. Will obtain a non con CT scan to see if it had passed or moved.   Patient can discharge once CT scan is complete and follow up with me and GI.     Jose Mendenhall MD  [facility]

## 2024-09-19 VITALS
DIASTOLIC BLOOD PRESSURE: 49 MMHG | HEIGHT: 65 IN | RESPIRATION RATE: 19 BRPM | SYSTOLIC BLOOD PRESSURE: 120 MMHG | OXYGEN SATURATION: 98 % | WEIGHT: 157 LBS | HEART RATE: 78 BPM | BODY MASS INDEX: 26.16 KG/M2 | TEMPERATURE: 98 F

## 2024-09-19 LAB
MAGNESIUM SERPL-MCNC: 1.9 MG/DL (ref 1.6–2.6)
POTASSIUM SERPL-SCNC: 4.2 MMOL/L (ref 3.5–5.1)

## 2024-09-19 PROCEDURE — 99239 HOSP IP/OBS DSCHRG MGMT >30: CPT | Performed by: INTERNAL MEDICINE

## 2024-09-19 RX ORDER — ALBUTEROL SULFATE 0.83 MG/ML
SOLUTION RESPIRATORY (INHALATION)
Status: COMPLETED
Start: 2024-09-19 | End: 2024-09-19

## 2024-09-19 RX ORDER — ALBUTEROL SULFATE 0.83 MG/ML
2.5 SOLUTION RESPIRATORY (INHALATION) EVERY 6 HOURS PRN
Status: DISCONTINUED | OUTPATIENT
Start: 2024-09-19 | End: 2024-09-19

## 2024-09-19 NOTE — DISCHARGE SUMMARY
[place] HOSPITALIST  DISCHARGE SUMMARY     Yudy Lama Patient Status:  Inpatient    1950 MRN [id number]   Location [facility] 3NW-A Attending No att. providers found   Hosp Day # 8 PCP Robert Kraus MD     Date of Admission: 2024  Date of Discharge:  2024     Discharge Disposition: Home or Self Care    Discharge Diagnosis:  #Perforated gastric ulcer suspected on admission but no evidence of this on UGI - usual PPI  #Acute vs chronic cholecystitis - no RUQ pain, likely chronic, repeat CT shows passage of stone  #cholecysto-gastric fistula  -diet advancement per surgery  -iv zosyn (-)  -UGI w/ no leak    #Left Adnexal 4.3 cm cyst, incidental finding   -have advised and discussed with patient, outpatient repeat imaging vs. TVUS, to discuss with PCP     #DEBORAH  -resolved     #Hypokalemia with frequent PVCs  -electrolytes replete  -stop chlorthalidone      #Thrombocytosis  -resolved     #Hypokalemia  -Replace and monitor      #CAD     #Essential hypertension  -PTA: Lisinopril  -hold Chlorthalidone      #Hyperlipidemia  -PTA: Rosuvastatin, fenofibrate     #GERD  -PTA: PPI    #Mild intermittent asthma  -prn albuterol     History of Present Illness:      Yudy Lama is a 74 year old female with PMHx essential hypertension, hyperlipidemia, GERD presents to hospital today with abdominal pain for about a month. Pt first noticed some intermittent abd x1 month. Had some emesis over last 24h. No fevers or chills. Pt decided to come in for further evaluation.      -S/p IV ceftriaxone, IV metronidazole, IV Zofran, IV Protonix, IV fluid bolus, general surgery consulted in the emergency    Brief Synopsis:     : Perforated gastric ulcer / fistula between the gallbladder and gastric antrum.  Large gallstone also seen in the splenic flexure of the colon. NPO, NGT for decompression, IVF, zosyn, Protonix.   : Ortho static Bps (per pt request) negative.   9/15: 1 small BM post rectal  suppository. Plan for Upper GI on 09/16 and GI consult for possible scope   9/16: Upper GI-No evidence of perforated gastric ulcer or obvious opacification of a cholecysto-enteric fistula- start clears. Diet fulls-tolerating   9/18: Awaiting CT to confirm stone movement. Tolerating soft diet. 3 runs of Vtach - K and mag replaced   9/19: repeat CT shows passage of stone, patient discharged home in stable condition.    Lace+ Score: 63  59-90 High Risk  29-58 Medium Risk  0-28   Low Risk       TCM Follow-Up Recommendation:  LACE > 58: High Risk of readmission after discharge from the hospital.      Procedures during hospitalization:   N/a    Incidental or significant findings and recommendations (brief descriptions):  See above    Lab/Test results pending at Discharge:   N/a    Consultants:  General Surgery, GI    Discharge Medication List:     Discharge Medications        CONTINUE taking these medications        Instructions Prescription details   Advair -21 MCG/ACT Aero  Generic drug: fluticasone-salmeterol      TAKE 2 PUFFS BY MOUTH TWICE A DAY **$492   Quantity: 3 each  Refills: 2     albuterol 108 (90 Base) MCG/ACT Aers  Commonly known as: Ventolin HFA      Inhale 2 puffs into the lungs every 6 (six) hours as needed for Wheezing.   Quantity: 18 g  Refills: 3     albuterol (2.5 MG/3ML) 0.083% Nebu  Commonly known as: Ventolin      Take 3 mL (2.5 mg total) by nebulization every 6 (six) hours as needed for Wheezing.   Quantity: 1 each  Refills: 5     aspirin 325 MG Tabs      Take 1 tablet (325 mg total) by mouth daily.   Refills: 0     clobetasol 0.05 % Oint  Commonly known as: Temovate      Apply twice daily to affected areas, reduce to using once, daily as the areas begin to resolve.   Quantity: 60 g  Refills: 5     dicyclomine 10 MG Caps  Commonly known as: Bentyl      TAKE 1 CAPSULE (10 MG TOTAL) BY MOUTH 4 (FOUR) TIMES DAILY BEFORE MEALS AND NIGHTLY.   Quantity: 120 capsule  Refills: 2     fenofibrate 145  MG Tabs  Commonly known as: Tricor      Take 1 tablet (145 mg total) by mouth once daily.   Quantity: 90 tablet  Refills: 1     Florastor 250 MG Caps  Generic drug: saccharomyces boulardii      As directed   Quantity: 1 capsule  Refills: 0     lisinopril 10 MG Tabs  Commonly known as: Zestril      Take 1 tablet (10 mg total) by mouth daily.   Refills: 0     Magnesium Oxide 140 MG Caps      500 MG PO Q DAY   Quantity: 28 capsule  Refills: 0     Nebulizer Dacia      One nebulizer   Quantity: 1 each  Refills: 0     Oysco 500 500 MG Tabs  Generic drug: calcium carbonate      TAKE 1 TABLET BY MOUTH 3 TIMES A DAY   Quantity: 90 tablet  Refills: 0     pantoprazole 40 MG Tbec  Commonly known as: Protonix      Take 1 tablet (40 mg total) by mouth daily as needed.   Quantity: 90 tablet  Refills: 1     rosuvastatin 5 MG Tabs  Commonly known as: Crestor      Take 1 tablet (5 mg total) by mouth nightly.   Quantity: 90 tablet  Refills: 1     silver sulfADIAZINE 1 % Crea  Commonly known as: Silvadene      Apply 1 Application topically 3 (three) times daily.   Quantity: 50 g  Refills: 0            STOP taking these medications      chlorthalidone 25 MG Tabs  Commonly known as: Hygroton        Quinapril HCl 10 MG Tabs  Commonly known as: ACCUPRIL                 [facility] n/a    Follow-up appointment:   Robert Kraus MD  [address] [facility] [id number] [phone]    Follow up      Jose Mendenhall MD  [address] [facility] [id number] [phone]    Schedule an appointment as soon as possible for a visit in 2 week(s)      Dimas Irizarry MD  [address] [facility]  [address]  [facility] [id number] [phone]    Follow up  As needed    Appointments for Next 30 Days 9/19/2024 - 10/19/2024      None            Vital signs:  Temp:  [97.8 °F (36.6 °C)-99.1 °F (37.3 °C)] 98 °F (36.7 °C)  Pulse:  [73-90] 78  Resp:  [18-19] 19  BP: (115-132)/(49-62) 120/49  SpO2:  [90 %-98 %] 98 %    Physical Exam:    General: No acute  distress   Lungs: clear to auscultation  Cardiovascular: S1, S2  Abdomen: Soft      -----------------------------------------------------------------------------------------------  PATIENT DISCHARGE INSTRUCTIONS: See electronic chart    Michel Denis DO    Total time spent on discharge plannin minutes     The  Century Cures Act makes medical notes like these available to patients in the interest of transparency. Please be advised this is a medical document. Medical documents are intended to carry relevant information, facts as evident, and the clinical opinion of the practitioner. The medical note is intended as peer to peer communication and may appear blunt or direct. It is written in medical language and may contain abbreviations or verbiage that are unfamiliar.

## 2024-09-19 NOTE — PROGRESS NOTES
[facility]  Progress Note    Yudy Lama Patient Status:  Inpatient    1950 MRN [id number]   Location [facility] 3NW-A Attending Michel Denis, DO   Hosp Day # 8 PCP Robert Kraus MD     Subjective:  The patient is resting comfortably in bed.  He underwent repeat CT scan yesterday which stated the suspected gallstone is no longer visualized and has presumably passed.    She reports minimal epigastric abdominal pain.  She is tolerating a soft diet without nausea or vomiting.  She reports passing flatus and having a bowel movement.    Objective/Physical Exam:  General: Alert, orientated x3.  Cooperative.  No apparent distress.  Vital Signs:  Blood pressure 120/49, pulse 78, temperature 98 °F (36.7 °C), temperature source Oral, resp. rate 19, height 65\", weight 157 lb (71.2 kg), last menstrual period 1995, SpO2 98%, not currently breastfeeding.  HEENT: Normocephalic, atraumatic. No scleral icterus.  Abdomen:  Soft, non-distended, non-tender, with no rebound or guarding.  No peritoneal signs.      Labs:  CBC:    Lab Results   Component Value Date    WBC 5.9 2024    WBC 7.2 2024    WBC 8.1 09/15/2024     Lab Results   Component Value Date    HEMOGLOBIN 14.9 2016    HEMOGLOBIN 14.3 2016    HEMOGLOBIN 14.9 2016    HGB 10.9 (L) 2024    HGB 11.0 (L) 2024    HGB 11.8 (L) 09/15/2024      Lab Results   Component Value Date    .0 2024    .0 2024    .0 09/15/2024     Lab Results   Component Value Date    K 4.2 2024    MG 1.9 2024         Assessment/Plan:  Patient Active Problem List   Diagnosis    Essential hypertension    Mixed hyperlipidemia    Other hemorrhoids    Mild intermittent asthma without complication (HCC)    Colon cancer screening    Eczema    Medicare annual wellness visit, subsequent    Visit for screening mammogram    Fatty liver    Asymptomatic varicose veins of unspecified lower extremity     Calculus of gallbladder without cholecystitis without obstruction    Atherosclerosis of coronary artery    Gastroesophageal reflux disease without esophagitis    Benign hematuria    Atrophic vaginitis    Primary generalized (osteo)arthritis    Irreducible umbilical hernia    Chronic venous insufficiency    History of renal calculi    Post-menopause    Abnormal chest CT    Impaired fasting glucose    Diverticulosis of colon    Hx of colonic polyps    Other irritable bowel syndrome    Well woman exam with routine gynecological exam    Kappa light chain deposition disorder (HCC)    Bronchiectasis without complication (HCC)    Acute gastric ulcer with perforation (HCC)    Gastric fistula     Contained gastric ulcer perforation    The patient is stable for discharge home today from a general surgery standpoint.  Continue diet as tolerated.  Continue pain control and antiemetics as needed.  Encourage ambulation and up to chair.  Discharge instructions discussed with the patient.  She is to follow-up with Dr. Mendenhall and gastroenterology in the next 2-3 weeks as an outpatient.      STEFANIE Romo  9/19/2024  10:21 AM       Patient seen and examined, I agree with the documentation above with the following addendum.    Feels well. Passing bms. CT scan no evidence of gallstone in the colon.   Physical exam:  General: nad   Abdomen: soft, non tender,non distended, no rebound or guarding       Assesment & Plan:  74 year old female with cholecysto-enteric fistula. She had a stone that passed into the colon without causing a gallstone ileus. She has another stone that is smaller within the lumen of the gallbladder which should pass without issue. I have her follow up with me in 2 weeks to assess her recovery and symptoms. In regards the gallbladder, it is fistulized and draining into the gut. The risk of bile duct injury and mortality is highly elevated in this case. She has no symptoms and thus would advise against  surgical removal of the gallbladder.   Patient is clear for discharge today from my standpoint.       Jose Mendenhall MD   [facility]           Jose Mendenhall MD  [facility]

## 2024-09-19 NOTE — PLAN OF CARE
Upon assessment pt A&Ox4.  VSS on room air.  Nsr on tele.  Denies chest pain, sob, nausea or emesis.  Abdomen soft, round, nontender. Visible hernia to middle of abdomen  Passing gas. Voids freely in adequate amounts. Tolerating soft diet. Ivf and abx running per order.  Pt updated on poc. Call light within reach.     CT results noted increase in bilateral pleural effusions, MD notified, no new orders at this time. Per ct stone presumably passed    2 RN/RN skin check performed w/donaldo rn.  Scattered red patches on skin- same as present on admission. No other skin issues noted    Pt reported SOB, pt states she uses an albuterol nebulizer at home for same issue.  MD contacted, orders received. Pt states improvement following neb

## 2024-09-19 NOTE — PROGRESS NOTES
Alert & oriented x4. VSS on room air. IS encouraged. Voids. Tolerating soft diet without nausea. Ambulates independently. Encouraged to sit in chair and walk in halls. Denies chest pain/SOB. Denies pain. Patient updated on plan of care. Questions and concerns addressed. Frequent rounding performed

## 2024-09-19 NOTE — PROGRESS NOTES
NURSING DISCHARGE NOTE    Discharged Home via Ambulatory.  Accompanied by Spouse  Belongings Taken by patient/family.    VSS, tolerating diet, voiding adequately, pain controlled, tolerating ambulation. Discharge education provided. Reviewed medications and follow up appts. All questions answered and concerns addressed, pt verbalized understanding. Personal inhaler returned to patient. IV and telemetry removed. Pt dc in stable condition. Patient declined wheelchair and left unit with  at 1317

## 2024-09-20 ENCOUNTER — HOSPITAL ENCOUNTER (INPATIENT)
Facility: HOSPITAL | Age: 74
LOS: 3 days | Discharge: HOME | DRG: 381 | End: 2024-09-24
Attending: EMERGENCY MEDICINE | Admitting: HOSPITALIST
Payer: MEDICARE

## 2024-09-20 ENCOUNTER — HOSPITAL ENCOUNTER (INPATIENT)
Facility: HOSPITAL | Age: 74
LOS: 3 days | Discharge: HOME | End: 2024-09-24
Attending: EMERGENCY MEDICINE | Admitting: HOSPITALIST
Payer: MEDICARE

## 2024-09-20 ENCOUNTER — APPOINTMENT (OUTPATIENT)
Dept: CT IMAGING | Facility: HOSPITAL | Age: 74
End: 2024-09-20
Attending: EMERGENCY MEDICINE
Payer: MEDICARE

## 2024-09-20 ENCOUNTER — APPOINTMENT (OUTPATIENT)
Dept: GENERAL RADIOLOGY | Facility: HOSPITAL | Age: 74
DRG: 381 | End: 2024-09-20
Attending: EMERGENCY MEDICINE
Payer: MEDICARE

## 2024-09-20 ENCOUNTER — APPOINTMENT (OUTPATIENT)
Dept: CT IMAGING | Facility: HOSPITAL | Age: 74
DRG: 381 | End: 2024-09-20
Attending: EMERGENCY MEDICINE
Payer: MEDICARE

## 2024-09-20 ENCOUNTER — APPOINTMENT (OUTPATIENT)
Dept: GENERAL RADIOLOGY | Facility: HOSPITAL | Age: 74
End: 2024-09-20
Attending: EMERGENCY MEDICINE
Payer: MEDICARE

## 2024-09-20 DIAGNOSIS — R06.00 DYSPNEA, UNSPECIFIED TYPE: ICD-10-CM

## 2024-09-20 DIAGNOSIS — K46.0 INCARCERATED HERNIA: ICD-10-CM

## 2024-09-20 DIAGNOSIS — K43.9 VENTRAL HERNIA: ICD-10-CM

## 2024-09-20 LAB
ALBUMIN SERPL-MCNC: 4.2 G/DL (ref 3.2–4.8)
ALBUMIN/GLOB SERPL: 1.2 {RATIO} (ref 1–2)
ALP LIVER SERPL-CCNC: 113 U/L
ALT SERPL-CCNC: 64 U/L
ANION GAP SERPL CALC-SCNC: 10 MMOL/L (ref 0–18)
AST SERPL-CCNC: 30 U/L (ref ?–34)
BASOPHILS # BLD AUTO: 0.02 X10(3) UL (ref 0–0.2)
BASOPHILS NFR BLD AUTO: 0.2 %
BILIRUB SERPL-MCNC: 1.2 MG/DL (ref 0.2–1.1)
BUN BLD-MCNC: 7 MG/DL (ref 9–23)
CALCIUM BLD-MCNC: 10.3 MG/DL (ref 8.7–10.4)
CHLORIDE SERPL-SCNC: 106 MMOL/L (ref 98–112)
CO2 SERPL-SCNC: 27 MMOL/L (ref 21–32)
CREAT BLD-MCNC: 0.55 MG/DL
EGFRCR SERPLBLD CKD-EPI 2021: 95 ML/MIN/1.73M2 (ref 60–?)
EOSINOPHIL # BLD AUTO: 0.1 X10(3) UL (ref 0–0.7)
EOSINOPHIL NFR BLD AUTO: 1 %
ERYTHROCYTE [DISTWIDTH] IN BLOOD BY AUTOMATED COUNT: 14.8 %
FLUAV + FLUBV RNA SPEC NAA+PROBE: NEGATIVE
FLUAV + FLUBV RNA SPEC NAA+PROBE: NEGATIVE
GLOBULIN PLAS-MCNC: 3.4 G/DL (ref 2–3.5)
GLUCOSE BLD-MCNC: 108 MG/DL (ref 70–99)
HCT VFR BLD AUTO: 42.1 %
HGB BLD-MCNC: 13.4 G/DL
IMM GRANULOCYTES # BLD AUTO: 0.03 X10(3) UL (ref 0–1)
IMM GRANULOCYTES NFR BLD: 0.3 %
LYMPHOCYTES # BLD AUTO: 1.06 X10(3) UL (ref 1–4)
LYMPHOCYTES NFR BLD AUTO: 10.9 %
MCH RBC QN AUTO: 29.3 PG (ref 26–34)
MCHC RBC AUTO-ENTMCNC: 31.8 G/DL (ref 31–37)
MCV RBC AUTO: 91.9 FL
MONOCYTES # BLD AUTO: 0.81 X10(3) UL (ref 0.1–1)
MONOCYTES NFR BLD AUTO: 8.3 %
NEUTROPHILS # BLD AUTO: 7.71 X10 (3) UL (ref 1.5–7.7)
NEUTROPHILS # BLD AUTO: 7.71 X10(3) UL (ref 1.5–7.7)
NEUTROPHILS NFR BLD AUTO: 79.3 %
OSMOLALITY SERPL CALC.SUM OF ELEC: 295 MOSM/KG (ref 275–295)
PLATELET # BLD AUTO: 311 10(3)UL (ref 150–450)
POTASSIUM SERPL-SCNC: 3.8 MMOL/L (ref 3.5–5.1)
PROT SERPL-MCNC: 7.6 G/DL (ref 5.7–8.2)
RBC # BLD AUTO: 4.58 X10(6)UL
RSV RNA SPEC NAA+PROBE: NEGATIVE
SARS-COV-2 RNA RESP QL NAA+PROBE: NOT DETECTED
SODIUM SERPL-SCNC: 143 MMOL/L (ref 136–145)
WBC # BLD AUTO: 9.7 X10(3) UL (ref 4–11)

## 2024-09-20 PROCEDURE — 71275 CT ANGIOGRAPHY CHEST: CPT | Performed by: EMERGENCY MEDICINE

## 2024-09-20 PROCEDURE — 74177 CT ABD & PELVIS W/CONTRAST: CPT | Performed by: EMERGENCY MEDICINE

## 2024-09-20 RX ORDER — ONDANSETRON 2 MG/ML
INJECTION INTRAMUSCULAR; INTRAVENOUS
Status: COMPLETED
Start: 2024-09-20 | End: 2024-09-20

## 2024-09-20 RX ORDER — SODIUM CHLORIDE 9 MG/ML
1000 INJECTION, SOLUTION INTRAVENOUS CONTINUOUS
Status: DISCONTINUED | OUTPATIENT
Start: 2024-09-20 | End: 2024-09-21

## 2024-09-20 RX ORDER — ONDANSETRON 2 MG/ML
4 INJECTION INTRAMUSCULAR; INTRAVENOUS ONCE
Status: DISCONTINUED | OUTPATIENT
Start: 2024-09-20 | End: 2024-09-20

## 2024-09-20 RX ORDER — MORPHINE SULFATE 4 MG/ML
4 INJECTION, SOLUTION INTRAMUSCULAR; INTRAVENOUS EVERY 30 MIN PRN
Status: DISCONTINUED | OUTPATIENT
Start: 2024-09-20 | End: 2024-09-24

## 2024-09-20 RX ORDER — ONDANSETRON 2 MG/ML
4 INJECTION INTRAMUSCULAR; INTRAVENOUS ONCE
Status: COMPLETED | OUTPATIENT
Start: 2024-09-20 | End: 2024-09-20

## 2024-09-20 NOTE — PAYOR COMM NOTE
--------------  CONTINUED STAY REVIEW  9/18-9/19  Payor: [employer]  Subscriber #:  [id number]  Authorization Number: [id number]    Admit date: 9/11/24  Admit time:  6:40 PM    REVIEW DOCUMENTATION:    9/18 Surgery    The patient was seen and examined at bedside.  She denies abdominal pain.  She states she is tolerating a full liquid diet.  She denies nausea or vomiting.  She is passing flatus and having bowel movements.       Contained gastric ulcer perforation     Plan:  Anticipate discharge home in the next 24 hours pending the patient's ability to tolerate a diet  Advance to soft diet  Antiemetics and analgesics as needed  Continue IV antibiotics-Zosyn  DVT prophylaxis with heparin  GI prophylaxis with Protonix twice daily  Follow-up with [facility] Surgery and gastroenterology in the next 2-3 weeks as an outpatient        Feels well. No abdominal pain. No nausea or vomiting. Having bms.      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 Will observe as attempting cholecystectomy in this case carries very high risk of bile duct injury.   As for the gallstone within the colon. It is not causing an obstruction. This should continue to move through the colon. Will obtain a non con CT scan to see if it had passed or moved.   Patient can discharge once CT scan is complete and follow up with me and GI.         9/18 IM  tolerated liquid diet without issue       Lab 09/12/24  0525 09/13/24  0601 09/14/24  0626 09/15/24  1123 09/16/24  0630 09/18/24  0536   WBC 8.6 5.1  --  8.1 7.2 5.9   HGB 12.8 12.3  --  11.8* 11.0* 10.9*   MCV 91.6 91.8  --  93.3 93.2 93.5   .0 300.0 260.0 254.0 221.0 221.0                  Recent Labs   Lab 09/12/24  0525 09/13/24  0601 09/14/24  0900 09/15/24  1123 09/16/24  0630 09/18/24  0536   GLU 86  72  --  80 79 101*   BUN 23 14  --  7* 6* <5*   CREATSERUM 0.81 0.67   < > 0.58 0.56 0.45*   CA 8.9 8.8  --  9.2 8.9 8.8   ALB 3.4 3.4  --   --   --   --     142  --  142 144 145   K 3.8 3.5  --  3.1* 3.5  3.5 2.9*    106  --  106 107 110   CO2 29.0 27.0  --  25.0 26.0 29.0   ALKPHO 49* 48*  --   --   --   --    AST 19 18  --   --   --   --    ALT 10 10  --   --   --   --    BILT 0.6 0.5  --   --   --   --    TP 6.5 6.2  --   --   --   --     < > = values in this interval not displayed.      Scheduled Medications    fluticasone-salmeterol  2 puff Oral BID    piperacillin-tazobactam  3.375 g Intravenous Q8H    pantoprazole  40 mg Intravenous Q12H    albuterol  2 puff Inhalation QID    heparin  5,000 Units Subcutaneous 2 times per day                  Assessment & Plan:  #Perforated gastric ulcer  #Acute vs chronic cholecystitis - no RUQ pain, likely chronic  #cholecysto-gastric fistula  -diet advancement per surgery  -iv zosyn (9/11-)  -iv PPI BID  -UGI w/ no leak     #DEBORAH  -resolved      #Thrombocytosis  -resolved     #Hypokalemia  -Replace and monitor      #CAD     #Essential hypertension  -PTA: Lisinopril  -hold Chlorthalidone      #Hyperlipidemia  -PTA: Rosuvastatin, fenofibrate     #GERD  -PTA: PPI    #Mild intermittent asthma  -prn albuterol      MEDICATIONS ADMINISTERED IN LAST 1 DAY:  albuterol (Ventolin HFA) 108 (90 Base) MCG/ACT inhaler 2 puff       Date Action Dose Route User    Discharged on 9/19/2024 9/19/2024 0847 Given 2 puff Inhalation Grasbryce, Owen          fluticasone-salmeterol (ADVAIR HFA) 230-21 MCG/ACT AERO 2 puff - PT Supplied       Date Action Dose Route User    Discharged on 9/19/2024 9/19/2024 0847 Given 2 puff Oral Grasley, Owen          heparin (Porcine) 5000 UNIT/ML injection 5,000 Units       Date Action Dose Route User    Discharged on 9/19/2024 9/19/2024 0847 Given 5,000 Units Subcutaneous (Left Lower Abdomen) Owen Sage          lisinopril (Zestril) tab  10 mg       Date Action Dose Route User    Discharged on 2024 0846 Given 10 mg Oral Owen Sage            Vitals (last day) before discharge       Date/Time Temp Pulse Resp BP SpO2 Weight O2 Device O2 Flow Rate (L/min) [facility]    24 0830 98 °F (36.7 °C) 78 19 120/49 98 % -- None (Room air) -- NW    24 0430 99.1 °F (37.3 °C) 83 18 132/54 90 % -- None (Room air) -- ED    24 0202 -- 90 18 -- -- -- None (Room air) -- TK    24 2355 98 °F (36.7 °C) 79 18 130/62 93 % -- None (Room air) -- ED    24 2000 97.8 °F (36.6 °C) 75 18 122/52 93 % -- None (Room air) 0 L/min ED    24 1715 98 °F (36.7 °C) 73 18 115/54 90 % -- None (Room air) -- AK    24 1605 -- 72 -- 130/54 -- -- -- -- AG    24 1245 98.6 °F (37 °C) 74 18 130/54 93 % -- None (Room air) -- AK    24 0730 97.8 °F (36.6 °C) 76 18 124/51 93 % -- None (Room air) -- AK    24 0449 98.4 °F (36.9 °C) 72 18 123/52 92 % -- None (Room air) -- GB    24 0015 97.9 °F (36.6 °C) 78 18 127/54 90 % -- None (Room air) -- GB           --------------  DISCHARGE REVIEW    Payor: [facility] MEDICARE ADV PPO  Subscriber #:  [id number]  Authorization Number: [id number]    Admit date: 24  Admit time:   6:40 PM  Discharge Date: 2024  1:18 PM     Admitting Physician: Walter Goodrich MD  Attending Physician:  No att. providers found  Primary Care Physician: Robert Kraus MD          Discharge Summary Notes        Discharge Summary signed by Michel Denis DO at 2024  4:46 PM       Author: Michel Denis DO Specialty: HOSPITALIST, Internal Medicine Author Type: Physician    Filed: 2024  4:46 PM Date of Service: 2024  4:40 PM Status: Signed    : Michel Denis DO (Physician)           [facility]     Yudy Lama Patient Status:  Inpatient    1950 MRN [id number]   Location [facility] 3N-A Attending No att. providers found   Hosp Day # 8 PCP Robert  MD Efra     Date of Admission: 9/11/2024  Date of Discharge:  9/19/2024     Discharge Disposition: Home or Self Care    Discharge Diagnosis:  #Perforated gastric ulcer suspected on admission but no evidence of this on UGI - usual PPI  #Acute vs chronic cholecystitis - no RUQ pain, likely chronic, repeat CT shows passage of stone  #cholecysto-gastric fistula  -diet advancement per surgery  -iv zosyn (9/11-9/18)  -UGI w/ no leak    #Left Adnexal 4.3 cm cyst, incidental finding   -have advised and discussed with patient, outpatient repeat imaging vs. TVUS, to discuss with PCP     #DEBORAH  -resolved     #Hypokalemia with frequent PVCs  -electrolytes replete  -stop chlorthalidone      #Thrombocytosis  -resolved     #Hypokalemia  -Replace and monitor      #CAD     #Essential hypertension  -PTA: Lisinopril  -hold Chlorthalidone      #Hyperlipidemia  -PTA: Rosuvastatin, fenofibrate     #GERD  -PTA: PPI    #Mild intermittent asthma  -prn albuterol     History of Present Illness:      Yudy Lama is a 74 year old female with PMHx essential hypertension, hyperlipidemia, GERD presents to hospital today with abdominal pain for about a month. Pt first noticed some intermittent abd x1 month. Had some emesis over last 24h. No fevers or chills. Pt decided to come in for further evaluation.      -S/p IV ceftriaxone, IV metronidazole, IV Zofran, IV Protonix, IV fluid bolus, general surgery consulted in the emergency    Brief Synopsis:     9/11: Perforated gastric ulcer / fistula between the gallbladder and gastric antrum.  Large gallstone also seen in the splenic flexure of the colon. NPO, NGT for decompression, IVF, zosyn, Protonix.   9/14: Ortho static Bps (per pt request) negative.   9/15: 1 small BM post rectal suppository. Plan for Upper GI on 09/16 and GI consult for possible scope   9/16: Upper GI-No evidence of perforated gastric ulcer or obvious opacification of a cholecysto-enteric fistula- start clears. Diet  fulls-tolerating   9/18: Awaiting CT to confirm stone movement. Tolerating soft diet. 3 runs of Vtach - K and mag replaced   9/19: repeat CT shows passage of stone, patient discharged home in stable condition.    Lace+ Score: 63  59-90 High Risk  29-58 Medium Risk  0-28   Low Risk       TCM Follow-Up Recommendation:  LACE > 58: High Risk of readmission after discharge from the hospital.      Procedures during hospitalization:   N/a    Incidental or significant findings and recommendations (brief descriptions):  See above    Lab/Test results pending at Discharge:   N/a    Consultants:  General Surgery, GI    Discharge Medication List:     Discharge Medications        CONTINUE taking these medications        Instructions Prescription details   Advair -21 MCG/ACT Aero  Generic drug: fluticasone-salmeterol      TAKE 2 PUFFS BY MOUTH TWICE A DAY **$492   Quantity: 3 each  Refills: 2     albuterol 108 (90 Base) MCG/ACT Aers  Commonly known as: Ventolin HFA      Inhale 2 puffs into the lungs every 6 (six) hours as needed for Wheezing.   Quantity: 18 g  Refills: 3     albuterol (2.5 MG/3ML) 0.083% Nebu  Commonly known as: Ventolin      Take 3 mL (2.5 mg total) by nebulization every 6 (six) hours as needed for Wheezing.   Quantity: 1 each  Refills: 5     aspirin 325 MG Tabs      Take 1 tablet (325 mg total) by mouth daily.   Refills: 0     clobetasol 0.05 % Oint  Commonly known as: Temovate      Apply twice daily to affected areas, reduce to using once, daily as the areas begin to resolve.   Quantity: 60 g  Refills: 5     dicyclomine 10 MG Caps  Commonly known as: Bentyl      TAKE 1 CAPSULE (10 MG TOTAL) BY MOUTH 4 (FOUR) TIMES DAILY BEFORE MEALS AND NIGHTLY.   Quantity: 120 capsule  Refills: 2     fenofibrate 145 MG Tabs  Commonly known as: Tricor      Take 1 tablet (145 mg total) by mouth once daily.   Quantity: 90 tablet  Refills: 1     Florastor 250 MG Caps  Generic drug: saccharomyces boulardii      As directed    Quantity: 1 capsule  Refills: 0     lisinopril 10 MG Tabs  Commonly known as: Zestril      Take 1 tablet (10 mg total) by mouth daily.   Refills: 0     Magnesium Oxide 140 MG Caps      500 MG PO Q DAY   Quantity: 28 capsule  Refills: 0     Nebulizer Dacia      One nebulizer   Quantity: 1 each  Refills: 0     Oysco 500 500 MG Tabs  Generic drug: calcium carbonate      TAKE 1 TABLET BY MOUTH 3 TIMES A DAY   Quantity: 90 tablet  Refills: 0     pantoprazole 40 MG Tbec  Commonly known as: Protonix      Take 1 tablet (40 mg total) by mouth daily as needed.   Quantity: 90 tablet  Refills: 1     rosuvastatin 5 MG Tabs  Commonly known as: Crestor      Take 1 tablet (5 mg total) by mouth nightly.   Quantity: 90 tablet  Refills: 1     silver sulfADIAZINE 1 % Crea  Commonly known as: Silvadene      Apply 1 Application topically 3 (three) times daily.   Quantity: 50 g  Refills: 0            STOP taking these medications      chlorthalidone 25 MG Tabs  Commonly known as: Hygroton        Quinapril HCl 10 MG Tabs  Commonly known as: ACCUPRIL                 [place]: [place]    Follow-up appointment:   Robert Kraus MD  [address] [facility] [id number] [phone]    Follow up      Jose Mendenhall MD  [address] [facility] [address] [phone]    Schedule an appointment as soon as possible for a visit in 2 week(s)      Dimas Irizarry MD  [address] [facility] [address]  [address]  [facility] [address] [phone]    Follow up  As needed    Appointments for Next 30 Days 9/19/2024 - 10/19/2024      None            Vital signs:  Temp:  [97.8 °F (36.6 °C)-99.1 °F (37.3 °C)] 98 °F (36.7 °C)  Pulse:  [73-90] 78  Resp:  [18-19] 19  BP: (115-132)/(49-62) 120/49  SpO2:  [90 %-98 %] 98 %    Physical Exam:    General: No acute distress   Lungs: clear to auscultation  Cardiovascular: S1, S2  Abdomen: Soft      -----------------------------------------------------------------------------------------------  PATIENT DISCHARGE  INSTRUCTIONS: See electronic chart    Michel Denis DO    Total time spent on discharge plannin minutes     The  [employer] Cures Act makes medical notes like these available to patients in the interest of transparency. Please be advised this is a medical document. Medical documents are intended to carry relevant information, facts as evident, and the clinical opinion of the practitioner. The medical note is intended as peer to peer communication and may appear blunt or direct. It is written in medical language and may contain abbreviations or verbiage that are unfamiliar.       Electronically signed by Michel Denis DO on 2024  4:46 PM         REVIEWER COMMENTS

## 2024-09-21 ENCOUNTER — APPOINTMENT (OUTPATIENT)
Dept: GENERAL RADIOLOGY | Facility: HOSPITAL | Age: 74
End: 2024-09-21
Attending: EMERGENCY MEDICINE
Payer: MEDICARE

## 2024-09-21 ENCOUNTER — APPOINTMENT (OUTPATIENT)
Dept: CV DIAGNOSTICS | Facility: HOSPITAL | Age: 74
DRG: 381 | End: 2024-09-21
Attending: INTERNAL MEDICINE
Payer: MEDICARE

## 2024-09-21 ENCOUNTER — APPOINTMENT (OUTPATIENT)
Dept: GENERAL RADIOLOGY | Facility: HOSPITAL | Age: 74
DRG: 381 | End: 2024-09-21
Attending: EMERGENCY MEDICINE
Payer: MEDICARE

## 2024-09-21 ENCOUNTER — ANESTHESIA (OUTPATIENT)
Dept: SURGERY | Facility: HOSPITAL | Age: 74
End: 2024-09-21
Payer: MEDICARE

## 2024-09-21 ENCOUNTER — ANESTHESIA EVENT (OUTPATIENT)
Dept: SURGERY | Facility: HOSPITAL | Age: 74
End: 2024-09-21
Payer: MEDICARE

## 2024-09-21 ENCOUNTER — APPOINTMENT (OUTPATIENT)
Dept: CV DIAGNOSTICS | Facility: HOSPITAL | Age: 74
End: 2024-09-21
Attending: INTERNAL MEDICINE
Payer: MEDICARE

## 2024-09-21 PROBLEM — R06.00 DYSPNEA, UNSPECIFIED TYPE: Status: ACTIVE | Noted: 2024-09-21

## 2024-09-21 PROBLEM — K46.0 INCARCERATED HERNIA: Status: ACTIVE | Noted: 2024-09-21

## 2024-09-21 LAB
ALBUMIN SERPL-MCNC: 3.5 G/DL (ref 3.2–4.8)
ALBUMIN/GLOB SERPL: 1 {RATIO} (ref 1–2)
ALP LIVER SERPL-CCNC: 107 U/L
ALT SERPL-CCNC: 50 U/L
ANION GAP SERPL CALC-SCNC: 10 MMOL/L (ref 0–18)
ANTIBODY SCREEN: NEGATIVE
APTT PPP: 35.5 SECONDS (ref 23–36)
AST SERPL-CCNC: 27 U/L (ref ?–34)
BILIRUB SERPL-MCNC: 0.8 MG/DL (ref 0.2–1.1)
BUN BLD-MCNC: 7 MG/DL (ref 9–23)
CALCIUM BLD-MCNC: 9.5 MG/DL (ref 8.7–10.4)
CHLORIDE SERPL-SCNC: 106 MMOL/L (ref 98–112)
CHOLEST SERPL-MCNC: 109 MG/DL (ref ?–200)
CO2 SERPL-SCNC: 26 MMOL/L (ref 21–32)
CREAT BLD-MCNC: 0.62 MG/DL
EGFRCR SERPLBLD CKD-EPI 2021: 92 ML/MIN/1.73M2 (ref 60–?)
ERYTHROCYTE [DISTWIDTH] IN BLOOD BY AUTOMATED COUNT: 14.7 %
GLOBULIN PLAS-MCNC: 3.5 G/DL (ref 2–3.5)
GLUCOSE BLD-MCNC: 171 MG/DL (ref 70–99)
HCT VFR BLD AUTO: 39.1 %
HDLC SERPL-MCNC: 34 MG/DL (ref 40–59)
HGB BLD-MCNC: 12.4 G/DL
INR BLD: 1.04 (ref 0.8–1.2)
LDLC SERPL CALC-MCNC: 54 MG/DL (ref ?–100)
MCH RBC QN AUTO: 29.5 PG (ref 26–34)
MCHC RBC AUTO-ENTMCNC: 31.7 G/DL (ref 31–37)
MCV RBC AUTO: 92.9 FL
NONHDLC SERPL-MCNC: 75 MG/DL (ref ?–130)
NT-PROBNP SERPL-MCNC: 9064 PG/ML (ref ?–450)
OSMOLALITY SERPL CALC.SUM OF ELEC: 296 MOSM/KG (ref 275–295)
PLATELET # BLD AUTO: 274 10(3)UL (ref 150–450)
POTASSIUM SERPL-SCNC: 3.8 MMOL/L (ref 3.5–5.1)
PROT SERPL-MCNC: 7 G/DL (ref 5.7–8.2)
PROTHROMBIN TIME: 13.6 SECONDS (ref 11.6–14.8)
RBC # BLD AUTO: 4.21 X10(6)UL
RH BLOOD TYPE: POSITIVE
RH BLOOD TYPE: POSITIVE
SODIUM SERPL-SCNC: 142 MMOL/L (ref 136–145)
TRIGL SERPL-MCNC: 114 MG/DL (ref 30–149)
TROPONIN I SERPL HS-MCNC: 26 NG/L
TROPONIN I SERPL HS-MCNC: 36 NG/L
VLDLC SERPL CALC-MCNC: 17 MG/DL (ref 0–30)
WBC # BLD AUTO: 12.7 X10(3) UL (ref 4–11)

## 2024-09-21 PROCEDURE — 3008F BODY MASS INDEX DOCD: CPT | Performed by: HOSPITALIST

## 2024-09-21 PROCEDURE — 1170F FXNL STATUS ASSESSED: CPT | Performed by: HOSPITALIST

## 2024-09-21 PROCEDURE — 99223 1ST HOSP IP/OBS HIGH 75: CPT | Performed by: HOSPITALIST

## 2024-09-21 PROCEDURE — 0WUF0KZ SUPPLEMENT ABDOMINAL WALL WITH NONAUTOLOGOUS TISSUE SUBSTITUTE, OPEN APPROACH: ICD-10-PCS | Performed by: STUDENT IN AN ORGANIZED HEALTH CARE EDUCATION/TRAINING PROGRAM

## 2024-09-21 PROCEDURE — 0WJG0ZZ INSPECTION OF PERITONEAL CAVITY, OPEN APPROACH: ICD-10-PCS | Performed by: STUDENT IN AN ORGANIZED HEALTH CARE EDUCATION/TRAINING PROGRAM

## 2024-09-21 PROCEDURE — 0WPF0JZ REMOVAL OF SYNTHETIC SUBSTITUTE FROM ABDOMINAL WALL, OPEN APPROACH: ICD-10-PCS | Performed by: STUDENT IN AN ORGANIZED HEALTH CARE EDUCATION/TRAINING PROGRAM

## 2024-09-21 PROCEDURE — 3074F SYST BP LT 130 MM HG: CPT | Performed by: HOSPITALIST

## 2024-09-21 PROCEDURE — 76942 ECHO GUIDE FOR BIOPSY: CPT | Performed by: ANESTHESIOLOGY

## 2024-09-21 PROCEDURE — 93306 TTE W/DOPPLER COMPLETE: CPT | Performed by: INTERNAL MEDICINE

## 2024-09-21 PROCEDURE — 0WBF0ZZ EXCISION OF ABDOMINAL WALL, OPEN APPROACH: ICD-10-PCS | Performed by: STUDENT IN AN ORGANIZED HEALTH CARE EDUCATION/TRAINING PROGRAM

## 2024-09-21 PROCEDURE — 3078F DIAST BP <80 MM HG: CPT | Performed by: HOSPITALIST

## 2024-09-21 PROCEDURE — 1111F DSCHRG MED/CURRENT MED MERGE: CPT | Performed by: HOSPITALIST

## 2024-09-21 PROCEDURE — 3E0T3BZ INTRODUCTION OF ANESTHETIC AGENT INTO PERIPHERAL NERVES AND PLEXI, PERCUTANEOUS APPROACH: ICD-10-PCS | Performed by: ANESTHESIOLOGY

## 2024-09-21 PROCEDURE — 0DBU0ZZ EXCISION OF OMENTUM, OPEN APPROACH: ICD-10-PCS | Performed by: STUDENT IN AN ORGANIZED HEALTH CARE EDUCATION/TRAINING PROGRAM

## 2024-09-21 PROCEDURE — 71045 X-RAY EXAM CHEST 1 VIEW: CPT | Performed by: EMERGENCY MEDICINE

## 2024-09-21 DEVICE — IMPLANTABLE DEVICE: Type: IMPLANTABLE DEVICE | Site: ABDOMEN | Status: FUNCTIONAL

## 2024-09-21 RX ORDER — HYDROMORPHONE HYDROCHLORIDE 1 MG/ML
0.2 INJECTION, SOLUTION INTRAMUSCULAR; INTRAVENOUS; SUBCUTANEOUS EVERY 5 MIN PRN
Status: DISCONTINUED | OUTPATIENT
Start: 2024-09-21 | End: 2024-09-21 | Stop reason: HOSPADM

## 2024-09-21 RX ORDER — ONDANSETRON 2 MG/ML
INJECTION INTRAMUSCULAR; INTRAVENOUS AS NEEDED
Status: DISCONTINUED | OUTPATIENT
Start: 2024-09-21 | End: 2024-09-21 | Stop reason: SURG

## 2024-09-21 RX ORDER — ONDANSETRON 2 MG/ML
4 INJECTION INTRAMUSCULAR; INTRAVENOUS EVERY 6 HOURS PRN
Status: DISCONTINUED | OUTPATIENT
Start: 2024-09-21 | End: 2024-09-24

## 2024-09-21 RX ORDER — FLUTICASONE PROPIONATE AND SALMETEROL XINAFOATE 230; 21 UG/1; UG/1
2 AEROSOL, METERED RESPIRATORY (INHALATION) 2 TIMES DAILY
Status: DISCONTINUED | OUTPATIENT
Start: 2024-09-21 | End: 2024-09-24

## 2024-09-21 RX ORDER — CEFAZOLIN SODIUM 1 G/3ML
INJECTION, POWDER, FOR SOLUTION INTRAMUSCULAR; INTRAVENOUS AS NEEDED
Status: DISCONTINUED | OUTPATIENT
Start: 2024-09-21 | End: 2024-09-21 | Stop reason: SURG

## 2024-09-21 RX ORDER — METRONIDAZOLE 500 MG/100ML
INJECTION, SOLUTION INTRAVENOUS AS NEEDED
Status: DISCONTINUED | OUTPATIENT
Start: 2024-09-21 | End: 2024-09-21 | Stop reason: SURG

## 2024-09-21 RX ORDER — MIDAZOLAM HYDROCHLORIDE 1 MG/ML
1 INJECTION INTRAMUSCULAR; INTRAVENOUS EVERY 5 MIN PRN
Status: DISCONTINUED | OUTPATIENT
Start: 2024-09-21 | End: 2024-09-21 | Stop reason: HOSPADM

## 2024-09-21 RX ORDER — ALBUTEROL SULFATE 0.83 MG/ML
2.5 SOLUTION RESPIRATORY (INHALATION) AS NEEDED
Status: DISCONTINUED | OUTPATIENT
Start: 2024-09-21 | End: 2024-09-21 | Stop reason: HOSPADM

## 2024-09-21 RX ORDER — LIDOCAINE HYDROCHLORIDE 20 MG/ML
10 JELLY TOPICAL ONCE
Status: COMPLETED | OUTPATIENT
Start: 2024-09-21 | End: 2024-09-21

## 2024-09-21 RX ORDER — POTASSIUM CHLORIDE 1500 MG/1
40 TABLET, EXTENDED RELEASE ORAL ONCE
Status: COMPLETED | OUTPATIENT
Start: 2024-09-21 | End: 2024-09-21

## 2024-09-21 RX ORDER — KETOROLAC TROMETHAMINE 30 MG/ML
INJECTION, SOLUTION INTRAMUSCULAR; INTRAVENOUS AS NEEDED
Status: DISCONTINUED | OUTPATIENT
Start: 2024-09-21 | End: 2024-09-21 | Stop reason: SURG

## 2024-09-21 RX ORDER — SODIUM CHLORIDE, SODIUM LACTATE, POTASSIUM CHLORIDE, CALCIUM CHLORIDE 600; 310; 30; 20 MG/100ML; MG/100ML; MG/100ML; MG/100ML
INJECTION, SOLUTION INTRAVENOUS CONTINUOUS
Status: DISCONTINUED | OUTPATIENT
Start: 2024-09-21 | End: 2024-09-21 | Stop reason: HOSPADM

## 2024-09-21 RX ORDER — DEXAMETHASONE SODIUM PHOSPHATE 4 MG/ML
VIAL (ML) INJECTION AS NEEDED
Status: DISCONTINUED | OUTPATIENT
Start: 2024-09-21 | End: 2024-09-21 | Stop reason: SURG

## 2024-09-21 RX ORDER — FUROSEMIDE 10 MG/ML
40 INJECTION INTRAMUSCULAR; INTRAVENOUS ONCE
Status: COMPLETED | OUTPATIENT
Start: 2024-09-21 | End: 2024-09-21

## 2024-09-21 RX ORDER — ACETAMINOPHEN 500 MG
1000 TABLET ORAL EVERY 6 HOURS PRN
Status: DISCONTINUED | OUTPATIENT
Start: 2024-09-21 | End: 2024-09-24

## 2024-09-21 RX ORDER — OXYCODONE HYDROCHLORIDE 10 MG/1
5 TABLET ORAL EVERY 4 HOURS PRN
Status: DISCONTINUED | OUTPATIENT
Start: 2024-09-21 | End: 2024-09-24

## 2024-09-21 RX ORDER — FUROSEMIDE 10 MG/ML
10 INJECTION INTRAMUSCULAR; INTRAVENOUS ONCE
Status: COMPLETED | OUTPATIENT
Start: 2024-09-21 | End: 2024-09-21

## 2024-09-21 RX ORDER — METOCLOPRAMIDE HYDROCHLORIDE 5 MG/ML
10 INJECTION INTRAMUSCULAR; INTRAVENOUS EVERY 8 HOURS PRN
Status: DISCONTINUED | OUTPATIENT
Start: 2024-09-21 | End: 2024-09-21 | Stop reason: HOSPADM

## 2024-09-21 RX ORDER — ALBUTEROL SULFATE 0.83 MG/ML
2.5 SOLUTION RESPIRATORY (INHALATION) EVERY 6 HOURS PRN
Status: DISCONTINUED | OUTPATIENT
Start: 2024-09-21 | End: 2024-09-24

## 2024-09-21 RX ORDER — HYDROMORPHONE HYDROCHLORIDE 1 MG/ML
0.4 INJECTION, SOLUTION INTRAMUSCULAR; INTRAVENOUS; SUBCUTANEOUS EVERY 5 MIN PRN
Status: DISCONTINUED | OUTPATIENT
Start: 2024-09-21 | End: 2024-09-21 | Stop reason: HOSPADM

## 2024-09-21 RX ORDER — FUROSEMIDE 10 MG/ML
INJECTION INTRAMUSCULAR; INTRAVENOUS
Status: COMPLETED
Start: 2024-09-21 | End: 2024-09-21

## 2024-09-21 RX ORDER — METOCLOPRAMIDE HYDROCHLORIDE 5 MG/ML
INJECTION INTRAMUSCULAR; INTRAVENOUS AS NEEDED
Status: DISCONTINUED | OUTPATIENT
Start: 2024-09-21 | End: 2024-09-21 | Stop reason: SURG

## 2024-09-21 RX ORDER — HYDROMORPHONE HYDROCHLORIDE 1 MG/ML
0.6 INJECTION, SOLUTION INTRAMUSCULAR; INTRAVENOUS; SUBCUTANEOUS EVERY 5 MIN PRN
Status: DISCONTINUED | OUTPATIENT
Start: 2024-09-21 | End: 2024-09-21 | Stop reason: HOSPADM

## 2024-09-21 RX ORDER — FLUTICASONE PROPIONATE AND SALMETEROL 500; 50 UG/1; UG/1
1 POWDER RESPIRATORY (INHALATION) 2 TIMES DAILY
Status: DISCONTINUED | OUTPATIENT
Start: 2024-09-21 | End: 2024-09-21 | Stop reason: SDUPTHER

## 2024-09-21 RX ORDER — ROCURONIUM BROMIDE 10 MG/ML
INJECTION, SOLUTION INTRAVENOUS AS NEEDED
Status: DISCONTINUED | OUTPATIENT
Start: 2024-09-21 | End: 2024-09-21 | Stop reason: SURG

## 2024-09-21 RX ORDER — NALOXONE HYDROCHLORIDE 0.4 MG/ML
0.08 INJECTION, SOLUTION INTRAMUSCULAR; INTRAVENOUS; SUBCUTANEOUS AS NEEDED
Status: DISCONTINUED | OUTPATIENT
Start: 2024-09-21 | End: 2024-09-21 | Stop reason: HOSPADM

## 2024-09-21 RX ORDER — PANTOPRAZOLE SODIUM 40 MG/1
40 TABLET, DELAYED RELEASE ORAL
Status: DISCONTINUED | OUTPATIENT
Start: 2024-09-21 | End: 2024-09-24

## 2024-09-21 RX ORDER — SODIUM CHLORIDE, SODIUM LACTATE, POTASSIUM CHLORIDE, CALCIUM CHLORIDE 600; 310; 30; 20 MG/100ML; MG/100ML; MG/100ML; MG/100ML
INJECTION, SOLUTION INTRAVENOUS CONTINUOUS PRN
Status: DISCONTINUED | OUTPATIENT
Start: 2024-09-21 | End: 2024-09-21 | Stop reason: SURG

## 2024-09-21 RX ORDER — MEPERIDINE HYDROCHLORIDE 25 MG/ML
12.5 INJECTION INTRAMUSCULAR; INTRAVENOUS; SUBCUTANEOUS AS NEEDED
Status: DISCONTINUED | OUTPATIENT
Start: 2024-09-21 | End: 2024-09-21 | Stop reason: HOSPADM

## 2024-09-21 RX ORDER — ENOXAPARIN SODIUM 100 MG/ML
40 INJECTION SUBCUTANEOUS NIGHTLY
Status: DISCONTINUED | OUTPATIENT
Start: 2024-09-21 | End: 2024-09-24

## 2024-09-21 RX ORDER — ROSUVASTATIN CALCIUM 5 MG/1
5 TABLET, COATED ORAL NIGHTLY
Status: DISCONTINUED | OUTPATIENT
Start: 2024-09-21 | End: 2024-09-24

## 2024-09-21 RX ORDER — ONDANSETRON 2 MG/ML
4 INJECTION INTRAMUSCULAR; INTRAVENOUS EVERY 6 HOURS PRN
Status: DISCONTINUED | OUTPATIENT
Start: 2024-09-21 | End: 2024-09-21 | Stop reason: HOSPADM

## 2024-09-21 RX ORDER — FENOFIBRATE 134 MG/1
134 CAPSULE ORAL
Status: DISCONTINUED | OUTPATIENT
Start: 2024-09-21 | End: 2024-09-24

## 2024-09-21 RX ORDER — LISINOPRIL 10 MG/1
10 TABLET ORAL DAILY
Status: DISCONTINUED | OUTPATIENT
Start: 2024-09-22 | End: 2024-09-24

## 2024-09-21 RX ADMIN — METOCLOPRAMIDE HYDROCHLORIDE 10 MG: 5 INJECTION INTRAMUSCULAR; INTRAVENOUS at 02:00:00

## 2024-09-21 RX ADMIN — KETOROLAC TROMETHAMINE 15 MG: 30 INJECTION, SOLUTION INTRAMUSCULAR; INTRAVENOUS at 04:05:00

## 2024-09-21 RX ADMIN — ONDANSETRON 4 MG: 2 INJECTION INTRAMUSCULAR; INTRAVENOUS at 04:05:00

## 2024-09-21 RX ADMIN — ROCURONIUM BROMIDE 20 MG: 10 INJECTION, SOLUTION INTRAVENOUS at 03:17:00

## 2024-09-21 RX ADMIN — SODIUM CHLORIDE, SODIUM LACTATE, POTASSIUM CHLORIDE, CALCIUM CHLORIDE: 600; 310; 30; 20 INJECTION, SOLUTION INTRAVENOUS at 01:49:00

## 2024-09-21 RX ADMIN — ROCURONIUM BROMIDE 50 MG: 10 INJECTION, SOLUTION INTRAVENOUS at 02:00:00

## 2024-09-21 RX ADMIN — DEXAMETHASONE SODIUM PHOSPHATE 4 MG: 4 MG/ML VIAL (ML) INJECTION at 02:00:00

## 2024-09-21 RX ADMIN — METRONIDAZOLE 500 MG: 500 INJECTION, SOLUTION INTRAVENOUS at 02:04:00

## 2024-09-21 RX ADMIN — CEFAZOLIN SODIUM 2 G: 1 INJECTION, POWDER, FOR SOLUTION INTRAMUSCULAR; INTRAVENOUS at 02:04:00

## 2024-09-21 NOTE — H&P
[facility]     Yudy Lama Patient Status:  Emergency    1950 MRN [id number]   Location [facility] EMERGENCY DEPARTMENT Attending Gianfranco Weiss MD   Hosp Day # 0 PCP Robert Kraus MD     Chief Complaint: Abdominal pain    Subjective:    History of Present Illness:     Yudy Lama is a 74 year old female with essential hypertension, dyslipidemia, GERD and asthma who presents with abdominal pain. Patient recently admitted for abdominal pain d/t suspected perforated gastric ulcer (ruled out) and acute vs chronic cholecystitis. 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 Patient treated with abx and bowel rest. Diet initiated and tolerated. She was discharged home. Patient returns with abdominal pain. She was found to have incarcerated ventral hernia with SBO for which she underwent exlap, open ventral hernia repair, appendectomy, partial omentectomy . Patient seen post-op. She states she is feeling better. Has abdominal pain at the margins. No nausea or vomiting. She was short of breath at home and on arrival in the ER, given Lasix to which she responded positively. Patient remains on oxygen, but is breathing better.     History/Other:    Past Medical History:  Past Medical History:    Atherosclerosis of coronary artery    Description: mild, medical therapy.     Atrophic vaginitis    Bundle branch block    Calculus of gallbladder without cholecystitis without obstruction    Chronic venous insufficiency    Cyst of ovary    Diverticulosis of colon    Left side    Eczema    Gastroesophageal reflux disease with esophagitis    Hx of colonic polyps    Hyperlipidemia, mixed    Hypertension, essential    Mild intermittent asthma without complication (HCC)    Primary generalized (osteo)arthritis    Ventral hernia without obstruction or gangrene    Repaired 2020     Past Surgical History:   Past Surgical History:   Procedure Laterality Date       1980    Colonoscopy  2015    Diverticulosis    Egd N/A 2021    Procedure: ESOPHAGOGASTRODUODENOSCOPY, COLONOSCOPY, POSSIBLE BIOPSY, POSSIBLE POLYPECTOMY 46137, 17047;  Surgeon: Sampson Damon MD;  Location: [facility]    Inguinal hernia repair  1968    Lithotripsy      Ventral hernia repair  2020    1.7 inch Ventralex ST hernia patch      Family History:   Family History   Problem Relation Age of Onset    Heart Surgery Mother 63    Other (Other) Mother          at 75y/o;  AAA surgery.    Breast Cancer Maternal Grandmother 87        87    Heart Surgery Brother         Coronary stents in 40's; CABG in 50's    Other (Other) Brother         AAA repair     Social History:    reports that she has quit smoking. She has never used smokeless tobacco. She reports current alcohol use. She reports that she does not use drugs.     Allergies: No Known Allergies    Medications:    Current Facility-Administered Medications on File Prior to Encounter   Medication Dose Route Frequency Provider Last Rate Last Admin    [COMPLETED] albuterol (Ventolin) (2.5 MG/3ML) 0.083% nebulizer solution        2.5 mg at 24 0202    [COMPLETED] potassium chloride (Klor-Con M20) tab 40 mEq  40 mEq Oral Q4H Walter Goodrich MD   40 mEq at 24 1134    [COMPLETED] potassium chloride (Klor-Con M20) tab 40 mEq  40 mEq Oral Once Michel Denis DO   40 mEq at 24 1744    [COMPLETED] magnesium sulfate 4 g/100mL IVPB premix 4 g  4 g Intravenous Once Michel Denis DO 50 mL/hr at 24 1855 4 g at 24 1855    [COMPLETED] potassium chloride 40 mEq in 250mL sodium chloride 0.9% IVPB premix  40 mEq Intravenous Once Bradly Fuentes MD 62.5 mL/hr at 09/15/24 1734 40 mEq at 09/15/24 1734    [COMPLETED] iopamidol 76% (ISOVUE-370) injection for power injector  100 mL Intravenous ONCE PRN Bradly Fuentes MD   100 mL at 24 1235    [COMPLETED] magnesium sulfate in sterile water for injection 2 g/50mL IVPB  premix 2 g  2 g Intravenous Once Selin Conley MD 50 mL/hr at 09/12/24 0823 2 g at 09/12/24 0823    [COMPLETED] ondansetron (Zofran) 4 MG/2ML injection 4 mg  4 mg Intravenous Once Aj Soria MD   4 mg at 09/11/24 1352    [COMPLETED] iopamidol 76% (ISOVUE-370) injection for power injector  100 mL Intravenous ONCE PRN Aj Soria MD   100 mL at 09/11/24 1534    [COMPLETED] pantoprazole (Protonix) 40 mg in sodium chloride 0.9% PF 10 mL IV push  40 mg Intravenous Once Aj Soria MD   40 mg at 09/11/24 1623    [COMPLETED] cefTRIAXone (Rocephin) 1 g in sodium chloride 0.9% 100 mL IVPB-ADDV  1 g Intravenous Once Aj Soria MD   Stopped at 09/11/24 1710    [COMPLETED] metRONIDAZOLE in sodium chloride 0.79% (Flagyl) 5 mg/mL IVPB premix 500 mg  500 mg Intravenous Once Aj Soria  mL/hr at 09/11/24 1715 500 mg at 09/11/24 1715     Current Outpatient Medications on File Prior to Encounter   Medication Sig Dispense Refill    lisinopril 10 MG Oral Tab Take 1 tablet (10 mg total) by mouth daily.      albuterol (2.5 MG/3ML) 0.083% Inhalation Nebu Soln Take 3 mL (2.5 mg total) by nebulization every 6 (six) hours as needed for Wheezing. 1 each 5    Respiratory Therapy Supplies (NEBULIZER) Does not apply Device One nebulizer 1 each 0    pantoprazole 40 MG Oral Tab EC Take 1 tablet (40 mg total) by mouth daily as needed. 90 tablet 1    rosuvastatin 5 MG Oral Tab Take 1 tablet (5 mg total) by mouth nightly. 90 tablet 1    fenofibrate 145 MG Oral Tab Take 1 tablet (145 mg total) by mouth once daily. 90 tablet 1    ADVAIR -21 MCG/ACT Inhalation Aerosol TAKE 2 PUFFS BY MOUTH TWICE A DAY **$492 3 each 2    Albuterol Sulfate HFA (VENTOLIN HFA) 108 (90 Base) MCG/ACT Inhalation Aero Soln Inhale 2 puffs into the lungs every 6 (six) hours as needed for Wheezing. 18 g 3    DICYCLOMINE HCL 10 MG Oral Cap TAKE 1 CAPSULE (10 MG TOTAL) BY MOUTH 4 (FOUR) TIMES DAILY BEFORE MEALS AND NIGHTLY. 120 capsule 2     Clobetasol Propionate 0.05 % External Ointment Apply twice daily to affected areas, reduce to using once, daily as the areas begin to resolve. 60 g 5    aspirin 325 MG Oral Tab Take 1 tablet (325 mg total) by mouth daily.      FLORASTOR 250 MG Oral Cap As directed 1 capsule 0    OYSCO 500 500 MG Oral Tab TAKE 1 TABLET BY MOUTH 3 TIMES A DAY 90 tablet 0    Magnesium Oxide 140 MG Oral Cap 500 MG PO Q DAY 28 capsule 0    silver sulfADIAZINE 1 % External Cream Apply 1 Application topically 3 (three) times daily. 50 g 0       Review of Systems:   A comprehensive review of systems was completed.    Pertinent positives and negatives noted in the HPI.    Objective:   Physical Exam:    /59 (BP Location: Left arm)   Pulse 85   Temp 97.8 °F (36.6 °C) (Oral)   Resp 21   Ht 5' (1.524 m)   Wt 165 lb (74.8 kg)   LMP 01/01/1995   SpO2 93%   BMI 32.22 kg/m²   General: No acute distress, Alert  Respiratory: Diminished at bases  Cardiovascular: S1, S2. Regular rate and rhythm  Abdomen: Soft, BS +, wound dressing in place  Neuro: No new focal deficits  Extremities: trace edema    Results:    Labs:      Labs Last 24 Hours:    Recent Labs   Lab 09/16/24  0630 09/18/24  0536 09/20/24  2042   RBC 3.67* 3.68* 4.58   HGB 11.0* 10.9* 13.4   HCT 34.2* 34.4* 42.1   MCV 93.2 93.5 91.9   MCH 30.0 29.6 29.3   MCHC 32.2 31.7 31.8   RDW 13.6 14.2 14.8   NEPRELIM 5.56 4.27 7.71*   WBC 7.2 5.9 9.7   .0 221.0 311.0       Recent Labs   Lab 09/16/24  0630 09/18/24  0536 09/18/24  1517 09/19/24  0505 09/20/24  2042   GLU 79 101*  --   --  108*   BUN 6* <5*  --   --  7*   CREATSERUM 0.56 0.45*  --   --  0.55   EGFRCR 95 100  --   --  95   CA 8.9 8.8  --   --  10.3   ALB  --   --   --   --  4.2    145  --   --  143   K 3.5  3.5 2.9* 3.4* 4.2 3.8    110  --   --  106   CO2 26.0 29.0  --   --  27.0   ALKPHO  --   --   --   --  113   AST  --   --   --   --  30   ALT  --   --   --   --  64*   BILT  --   --   --   --  1.2*   TP   --   --   --   --  7.6       Lab Results   Component Value Date    INR 1.04 09/20/2024    INR 1.0 07/30/2020    INR 1 12/14/2011       Recent Labs   Lab 09/20/24 2042   TROPHS 36*       Recent Labs   Lab 09/20/24 2042   PBNP 9,064*       No results for input(s): \"PCT\" in the last 168 hours.    Imaging: Imaging data reviewed in Epic.    Assessment & Plan:      #Abdominal pain d/t incarcerated ventral hernia with SBO sp exlap, open ventral hernia repair, appendectomy, partial omentectomy 9/21  -CLD  -Hold IVF  -Pain control  -Wound care  -Surgical service following    #Elevated bilirubin  #Cholecysto-gastric fistula  #Gallstone, large, within lumen   -Monitor LFTs    #GERD  -PPI    #Acute hypoxic respiratory failure d/t acute pulmonary edema, pleural effusion, bilateral, moderate and atelectasis   -Hold further Lasix right now, re-assess today  -Oxygen, wean as able  -Incentive spirometry  -Increase activity     #Mediastinal lymphadenopathy, etiology?   -Outpatient follow-up    #Pericardial effusion, minimal   -Consider ECHO    #Essential hypertension  #Dyslipidemia  #Asthma    DVT Px: Per surgery     Plan of care discussed with patient,  and ER.    Antonio Mcmillan MD    Supplementary Documentation:     The 21st Century Cures Act makes medical notes like these available to patients in the interest of transparency. Please be advised this is a medical document. Medical documents are intended to carry relevant information, facts as evident, and the clinical opinion of the practitioner. The medical note is intended as peer to peer communication and may appear blunt or direct. It is written in medical language and may contain abbreviations or verbiage that are unfamiliar.

## 2024-09-21 NOTE — PROGRESS NOTES
[facility]  Progress Note    Yudy Lama Patient Status:  Inpatient    1950 MRN [id number]   Location [facility]NE-A Attending Antonio Mcmillan MD   Hosp Day # 0 PCP Robert Kraus MD     Subjective:  The patient was seen and examined at bedside. She states she feels very distended today. She denies nausea or vomiting. She is tolerating clear liquids. She denies passing flatus or having a bowel movement.     Objective/Physical Exam:  /57 (BP Location: Right arm)   Pulse 74   Temp 97.7 °F (36.5 °C) (Oral)   Resp 20   Ht 60\"   Wt 164 lb 15.9 oz (74.8 kg)   LMP 1995   SpO2 93%   BMI 32.22 kg/m²     Intake/Output Summary (Last 24 hours) at 2024 1451  Last data filed at 2024 1400  Gross per 24 hour   Intake 1340 ml   Output 2150 ml   Net -810 ml         General: Alert, oriented x3. No acute distress.  HEENT: Normocephalic, atraumatic. No scleral icterus.  Pulmonary: No respiratory distress, effort normal.   Abdomen: Distended, with tympany to percussion. Soft, incisional site tenderness to palpation. No rebound or guarding. No peritoneal signs.   Incision: midline incision with provena wound vac in place  Extremities: No lower extremity edema. No clubbing or cyanosis.   Skin: Warm, dry. No jaundice.   Drain: 80 ml ss output since surgery      Labs:  Lab Results   Component Value Date    WBC 12.7 2024    HGB 12.4 2024    HCT 39.1 2024    .0 2024      Lab Results   Component Value Date    INR 1.04 2024    INR 1.0 2020    INR 1 2011     Lab Results   Component Value Date     2024    K 3.8 2024     2024    CO2 26.0 2024    BUN 7 2024    CREATSERUM 0.62 2024     2024    CA 9.5 2024    ALKPHO 107 2024    ALT 50 2024    AST 27 2024    BILT 0.8 2024    ALB 3.5 2024    TP 7.0 2024          Assessment:  Patient Active Problem  List   Diagnosis    Essential hypertension    Mixed hyperlipidemia    Other hemorrhoids    Mild intermittent asthma without complication (HCC)    Colon cancer screening    Eczema    Medicare annual wellness visit, subsequent    Visit for screening mammogram    Fatty liver    Asymptomatic varicose veins of unspecified lower extremity    Calculus of gallbladder without cholecystitis without obstruction    Atherosclerosis of coronary artery    Gastroesophageal reflux disease without esophagitis    Benign hematuria    Atrophic vaginitis    Primary generalized (osteo)arthritis    Irreducible umbilical hernia    Chronic venous insufficiency    History of renal calculi    Post-menopause    Abnormal chest CT    Impaired fasting glucose    Diverticulosis of colon    Hx of colonic polyps    Other irritable bowel syndrome    Well woman exam with routine gynecological exam    Kappa light chain deposition disorder (HCC)    Bronchiectasis without complication (HCC)    Acute gastric ulcer with perforation (HCC)    Gastric fistula    Incarcerated hernia    Dyspnea, unspecified type     POD 0 exploratory laparotomy, open ventral hernia repair with biologic mesh, appendectomy, partial omentectomy, mesh explantation    Plan:  Continue clear liquid diet until further return of bowel function  Antiemetics and analgesics as needed  Continue montenegro catheter until improved urine output   Encourage ambulation and up to chair  Encourage incentive spirometer   Medical management per primary  DVT prophylaxis with lovenox  GI prophylaxis with protonix       My total face time with this patient was 25 minutes. Greater than half of the visit was spent in counseling the patient on the above listed diagnoses and treatment options.     Tayler Gomez PA-C  9/21/2024  2:51 PM

## 2024-09-21 NOTE — ANESTHESIA PROCEDURE NOTES
Airway  Date/Time: 9/21/2024 1:57 AM  Urgency: elective      General Information and Staff    Patient location during procedure: OR  Anesthesiologist: Shailesh Cano MD  Performed: anesthesiologist   Performed by: Shailesh Cano MD  Authorized by: Shailesh Cano MD      Indications and Patient Condition  Indications for airway management: anesthesia  Sedation level: deep  Preoxygenated: yes  Patient position: sniffing  Mask difficulty assessment: 0 - not attempted    Final Airway Details  Final airway type: endotracheal airway      Successful airway: ETT  Cuffed: yes   Successful intubation technique: direct laryngoscopy  Facilitating devices/methods: rapid sequence intubation  Endotracheal tube insertion site: oral  Blade: Barbara  Blade size: #3  ETT size (mm): 7.5    Cormack-Lehane Classification: grade I - full view of glottis  Placement verified by: capnometry   Measured from: lips  ETT to lips (cm): 21  Number of attempts at approach: 1

## 2024-09-21 NOTE — OPERATIVE REPORT
OPERATIVE NOTE    Yudy MULLEN Reading Location: OR   [facility] [id number] MRN [id number]   Admission Date 9/20/2024 Operation Date 9/21/2024   Attending Physician Selin Conley MD Operating Physician Selin Conley MD       PREOPERATIVE DIAGNOSIS  Incarcerated ventral hernia with small bowel obstruction    POSTOPERATIVE DIAGNOSIS  Same    PROCEDURE PERFORMED  Exploratory laparotomy  Open ventral hernia repair with biologic mesh overlay  Appendectomy  Partial omentectomy  Mesh explantation    SURGEON  Selin Conley MD    ASSISTANTS  Jake Castorena, surgical assist.  His assistance was necessary for the conduct of this case including dissection of the multiple hernias and open hernia repair    ANESTHESIA  General    INDICATIONS  This is a 74 year old female who presented to the hospital with 1 day history of lower abdominal pain, nausea, and vomiting.  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  Patient has a known ventral hernia from that admission and on presentation today, hernia was hard and not reducible.  Workup was significant for an incarcerated ventral hernia with associated small bowel obstruction on CT imaging.  Serology was all negative but on physical exam, hernia was hard, not reducible, and had overlying skin color changes.  Emergent open repair was indicated.    FINDINGS   Multiple ventral hernias along the midline and just superior and inferior to patient's prior umbilical hernia repair, once all hernias were reduced and opened, the total defect was 11 x 11 cm, the biggest hernia contained omentum and small bowel while the small hernias contain only omentum, viable small bowel but necrotic appendix most likely from being within the hernia, patient's previous mesh removed to aid in adequate repair, abdominal fascia closed primarily with phasix overlay mesh 14 x 8 cm secondary to necrotic  appendix found    FINDINGS/DESCRIPTION OF PROCEDURE  After informed consent, patient was brought to the operating room and placed in supine position. Bilateral SCDs were placed and pre-operative antibiotics administered. Anesthesia was induced without any complication.  Rosales catheter was placed.  Patient was prepped and draped in the usual sterile fashion. Time out was performed confirming patient, procedure, and site.    Using a blade, infraumbilical midline incision was made.  Subcutaneous tissues were divided until the hernia sac was identified.  Once the  hernia was identified, I attempted to dissect around it down to the fascia but there was scar tissue in the surrounding tissues.  I opted to enter the sac to be able to resected at the level of the fascia but the hernia was loculated.  There was omentum adherent to the sac where I entered.  I was unable to fully enter the abdominal cavity at this time so I decided to enter another portion of the sac.  On the most inferior portion, I was able to enter the abdominal cavity.  I resected the hernia sac off the fascia at the level of the fascia, taking care not to include any intestines.  There was omentum that was adhered to multiple areas of the hernia sac.  Using LigaSure, these adhesions were taken down.  Once the sac was completely resected, it was sent to pathology as hernia sac and contents.  Defect was approximately 4 cm. Palpation of the surrounding area revealed multiple tiny hernia defects consistent with Argentine cheese morphology.  There was a hernia just superior to this hernia.  This hernia contained omentum.  I was able to reduce the omentum into the abdominal cavity and removed hernia sac.  I then opened the bridge of fascia to connected to the defect.  After doing this, patient had a known second ventral hernia superior to her umbilicus.  At this time, I was not going to repair that supraumbilical hernia as the area I was working with was away from  it.  I then identified the strong fascial layer and then divided the subcutaneous tissues to expose enough landing zone for a mesh repair.  In doing this, I realized patient had multiple hernias that was spotted throughout the fascia on both sides of the hernia.  At this time, I decided to open the incision more and do a more thorough investigation and repair.  Using blade, incision was extended superiorly to uncover the second known hernia.  Once again, subcutaneous tissues were dissected until this hernia sac was identified.  Entering the sac, I found only omentum.  I was able to resect this hernia down at the level of the fascia which was extremely weak.  I then connected the bridge of fascia that was between the infraumbilical hernia and the supraumbilical hernia.  Once the abdominal cavity was completely opened, I took down all adhesions to the anterior abdominal wall to create enough landing zone for the mesh repair.  All the adhesions were to omentum.  I then proceeded to continue to divide the subcutaneous tissues to make an adequate landing zone for the mesh repair.  In doing this, I found multiple other hernias that were along the midline, all small and all containing omentum.  At this point, the previous placed mesh was not doing anything and potentially harboring more hernias.  At this time, I decided to remove that mesh, making sure to protect the umbilicus.  Once the mesh was removed, this was sent to pathology as mesh explantation.  And as suspected, in removing the mesh, there were other areas of hernias that were reduced and removed.  Once the anterior abdominal wall was completely freed, I then eviscerated the intestines to ensure that the small bowel that was within the hernia sac was viable.  I also eviscerated the omentum that was taken down piecemeal from the hernias.  Small bowel was pink and healthy and had scar tissue from being chronically in the hernia.  The small bowel was the distal  ileum.  I did eviscerate cecum as well as the appendix.  On examination, the appendix tip was completely necrotic, no evidence of perforation or purulence but it was ischemic.  I decided to do an appendectomy because of this.  I made a window at the base of the appendix and then divided the mesoappendix and all other attachments with the LigaSure.  Using a BRIONNA stapler with a blue load, I divided the appendix.  This was sent to pathology as appendix.  The omentum that was taken piecemeal from the multiple hernias was potentially a nidus for bowel obstruction later in the future.  Because of this, I elected to remove it with the LigaSure.  This was sent to pathology as partial omentectomy.  I then irrigated the abdominal cavity thoroughly.    At this time, because patient had the necrotic appendix requiring appendectomy, as well as patient's recent history of developing a cholegastric fistula that led to free air, I decided not to place permanent mesh for repair but instead to use biological mesh in an onlay fashion to reinforce closing the fascia primarily.  The fascia was identified and closed with #1 PDS in a running with intermittent locking fashion.  I then cut a piece of phasix mesh to approximately 14 x 8 cm and placed this over the repair.  I then sutured this mesh in place with interrupted #1 PDS.  The umbilicus was fastened to the underlying mesh and fascia with 2-0 Vicryl.  The skin on the supraumbilical aspect of the incision was very thin and weak.  I decided to remove some of the skin, exposing skin that had a subcutaneous padding underneath.  This was to help with covering the mesh as well as the PDS sutures.  Because there was a lot of undermining and there was a large pocket, I decided to place a 7 Samoan flat ALEJANDRINA drain above the mesh to help prevent seroma formation.  The drain came out the right lower quadrant subcutaneous tissues.  The drain was anchored to the skin with a 2-0 nylon.  The midline  incision was then closed.  I reapproximated the umbilicus with 3-0 Vicryl and then reapproximated the entire incision with 3-0 Vicryl.  The deep space of the infraumbilical portion of the incision was closed with 2-0 Vicryl before the 3-0 Vicryl.  Finally, skin was closed with staples and a Prevena VAC placed.    At the end of the case, all counts were correct. Patient tolerated procedure well. Patient was awakened and transferred to PACU in a hemodynamically stable condition.    SPECIMENS REMOVED  Hernia sac and contents  Appendix  Omentum    ESTIMATED BLOOD LOSS  50 ml    COMPLICATIONS  None     Selin Conley MD

## 2024-09-21 NOTE — ED PROVIDER NOTES
Patient Seen in: [facility] Emergency Department      History     Chief Complaint   Patient presents with    Nausea/Vomiting/Diarrhea    Difficulty Breathing     Stated Complaint: inpatient 24 hours ago, gastric ulcer with perforation, n/v bile x 24 hours. sob    Subjective:   HPI    Patient is a 74-year-old female presents to ED for evaluation of abdominal pain, shortness of breath.  Patient admitted to hospital on  for potential perforated gastric ulcer.  Patient had upper GI that did not show any evidence for perforated ulcer.  Patient was discharged home yesterday.  Patient complained of some shortness of breath yesterday.  She had some abdominal pain starting this morning.  She has known hernias with hernia repair in the past.  She states her hernia normally is reducible but now it is not.  She had at least 8 episodes of vomiting.  Patient denies cough.  She denies chest pain.  No fever.    Objective:   Past Medical History:    Atherosclerosis of coronary artery    Description: mild, medical therapy.     Atrophic vaginitis    Bundle branch block    Calculus of gallbladder without cholecystitis without obstruction    Chronic venous insufficiency    Cyst of ovary    Diverticulosis of colon    Left side    Eczema    Gastroesophageal reflux disease with esophagitis    Hx of colonic polyps    Hyperlipidemia, mixed    Hypertension, essential    Mild intermittent asthma without complication (HCC)    Primary generalized (osteo)arthritis    Ventral hernia without obstruction or gangrene    Repaired 2020              Past Surgical History:   Procedure Laterality Date      1980    Colonoscopy  2015    Diverticulosis    Egd N/A 2021    Procedure: ESOPHAGOGASTRODUODENOSCOPY, COLONOSCOPY, POSSIBLE BIOPSY, POSSIBLE POLYPECTOMY 87733, [id number];  Surgeon: Sampson Damon MD;  Location: [facility]    Inguinal hernia repair  1968    Lithotripsy      Ventral hernia repair  2020     1.7 inch Ventralex ST hernia patch                Social History     Socioeconomic History    Marital status:    Tobacco Use    Smoking status: Former    Smokeless tobacco: Never   Vaping Use    Vaping status: Never Used   Substance and Sexual Activity    Alcohol use: Yes     Alcohol/week: 0.0 standard drinks of alcohol     Comment: ocassionally    Drug use: No     Social Determinants of Health     Food Insecurity: No Food Insecurity (9/11/2024)    Food Insecurity     Food Insecurity: Never true   Transportation Needs: No Transportation Needs (9/11/2024)    Transportation Needs     Lack of Transportation: No   Housing Stability: Low Risk  (9/11/2024)    Housing Stability     Housing Instability: No              Review of Systems    Positive for stated Chief Complaint: Nausea/Vomiting/Diarrhea and Difficulty Breathing    Other systems are as noted in HPI.  Constitutional and vital signs reviewed.      All other systems reviewed and negative except as noted above.    Physical Exam     ED Triage Vitals   BP 09/20/24 2028 (!) 167/87   Pulse 09/20/24 2028 86   Resp 09/20/24 2028 20   Temp 09/20/24 2028 98.4 °F (36.9 °C)   Temp src 09/21/24 0431 Temporal   SpO2 09/20/24 2028 91 %   O2 Device 09/20/24 2028 None (Room air)       Current Vitals:   Vital Signs  BP: 139/65  Pulse: 84  Resp: 24  Temp: 98.1 °F (36.7 °C)  Temp src: Temporal  MAP (mmHg): 85    Oxygen Therapy  SpO2: 95 %  O2 Device: Nasal cannula  O2 Flow Rate (L/min): 4 L/min  Pulse Oximetry Type: Continuous  Oximetry Probe Site Changed: No            Physical Exam    GENERAL: No acute distress, well appearing and non-toxic, Alert and oriented X 3   HEENT: Normocephalic, atraumatic.  Moist mucous membranes.  Pupils equal round reactive to light and accommodation, extraocular motion is intact, sclerae white, conjunctiva is pink.  Oropharynx is unremarkable, no exudate.  NECK: Supple, trachea midline, no lymphadenopathy.   LUNG: Slight crackles bilateral  bases  CARDIOVASCULAR: Regular rate and rhythm.  Normal S1S2.  No S3S4 or murmur.  ABDOMEN: Bowel sounds are present.  Patient has a nonreducible hernia with slight redness to the skin of the lower abdomen.  Hernia is tender to palpation.  Upper abdomen nontender  MUSCULOSKELETAL: No calf tenderness.  Dorsalis and Posterior Tibial pulses present. No clubbing. No cyanosis.  No edema.   SKIN EXAMINATIoN: Warm and dry with normal appearance.  No rashes or lesions.  NEUROLOGICAL:  Motor strength intact all groups.  normal sensation, speech intact    ED Course     Labs Reviewed   CBC WITH DIFFERENTIAL WITH PLATELET - Abnormal; Notable for the following components:       Result Value    Neutrophil Absolute Prelim 7.71 (*)     Neutrophil Absolute 7.71 (*)     All other components within normal limits   COMP METABOLIC PANEL (14) - Abnormal; Notable for the following components:    Glucose 108 (*)     BUN 7 (*)     ALT 64 (*)     Bilirubin, Total 1.2 (*)     All other components within normal limits   TROPONIN I HIGH SENSITIVITY - Abnormal; Notable for the following components:    Troponin I (High Sensitivity) 36 (*)     All other components within normal limits   PRO BETA NATRIURETIC PEPTIDE - Abnormal; Notable for the following components:    Pro-Beta Natriuretic Peptide 9,064 (*)     All other components within normal limits   LIPID PANEL - Abnormal; Notable for the following components:    HDL Cholesterol 34 (*)     All other components within normal limits   PROTHROMBIN TIME (PT) - Normal   PTT, ACTIVATED - Normal   SARS-COV-2/FLU A AND B/RSV BY PCR (GENEXPERT) - Normal    Narrative:     This test is intended for the qualitative detection and differentiation of SARS-CoV-2, influenza A, influenza B, and respiratory syncytial virus (RSV) viral RNA in nasopharyngeal or nares swabs from individuals suspected of respiratory viral infection consistent with COVID-19 by their healthcare provider. Signs and symptoms of  respiratory viral infection due to SARS-CoV-2, influenza, and RSV can be similar.    Test performed using the Xpert Xpress SARS-CoV-2/FLU/RSV (real time RT-PCR)  assay on the [employer] instrument, [place], [place] [address].   This test is being used under the Food and Drug Administration's Emergency Use Authorization.    The authorized Fact Sheet for Healthcare Providers for this assay is available upon request from the laboratory.   TYPE AND SCREEN    Narrative:     The following orders were created for panel order Type and screen.  Procedure                               Abnormality         Status                     ---------                               -----------         ------                     ABORH (Blood Type)[[id number]]                               Final result               Antibody Screen[[id number]]                                  Final result                 Please view results for these tests on the individual orders.   ABORH (BLOOD TYPE)   ANTIBODY SCREEN   ABORH CONFIRMATION   SURGICAL PATHOLOGY TISSUE   RAINBOW DRAW LAVENDER   RAINBOW DRAW LIGHT GREEN   RAINBOW DRAW BLUE   RAINBOW DRAW GOLD   Troponin 36.  proBNP 9064.    EKG  Rate, Axis and intervals as noted.  I agree with computer interpretation.  Rate: 80  Rhythm: Sinus rhythm  Reading: Right bundle branch block.  Patient has slight ST depression V2 through V6.  EKG unchanged when compared to prior EKG 2021  I personally reviewd CT images of chest, abdomen and independent interpretation shows ventral wall hernia with pleural effusions.  I also viewed formal radiology report as read by radiology with findings below:  CTA CHEST + CT ABD (W) + CT PEL (W) (CPT=71275/67609)    Result Date: 9/21/2024  PROCEDURE:  CTA CHEST + CT ABD (W) + CT PEL (W) (CPT=71275/08325)  COMPARISON:  EDJARETH , CT, CT ABDOMEN+PELVIS(CPT=74176), 9/18/2024, 8:45 PM.  INDICATIONS:  Acute chest and abdominal pain with shortness of breath.  TECHNIQUE:  CT of the chest  (with CTA imaging), abdomen, and pelvis were obtained post- injection of non-ionic intravenous contrast material. Multi-planar reformatted/3-D images were created to optimize visualization of vascular anatomy.  Dose reduction techniques were used. Dose information is transmitted to the ACR (American College of Radiology) NRDR (National Radiology Data Registry) which includes the Dose Index Registry.  PATIENT STATED HISTORY:(As transcribed by Technologist)  Abdumen pain and shortness of breath starting today   CONTRAST USED:  100cc of Isovue 370  FINDINGS:   CHEST:  LUNGS/PLEURA:  There are moderate bilateral pleural effusions, increased in size from prior imaging.  Secondary passive atelectatic changes of the dependent aspects of both lungs.  There is also a mosaic perfusion pattern throughout the visualized lungs and pulmonary vascular congestion, suspicious for a mild degree of diffuse pulmonary edema. MEDIASTINUM:  Mild nonspecific mediastinal lymphadenopathy.  There is a reference lymph node within the pretracheal region measuring 1.4 cm in short axis dimension.  There is an additional reference lymph node within the AP window measuring 1.6 cm. CHERISE:  No mass or adenopathy.  CARDIAC:  Borderline cardiomegaly is noted.  Mild atherosclerotic calcifications of the LAD.  There is a minimal pericardial effusion measuring 6 mm. CHEST WALL:  No mass or axillary adenopathy.  AORTA:  No aneurysm or dissection.  VASCULATURE:  No visible pulmonary arterial thrombus or attenuation.   ABDOMEN/PELVIS: LIVER:  Borderline hepatomegaly measuring 21.7 cm in craniocaudal dimension.  Mild fatty infiltration of the liver suggested.  There is a small nonenhancing simple cyst along the dome of the right lobe of the liver anteriorly measuring 1 cm.  This can be  retrospectively seen on the previous study.  This is consistent with a simple cyst and no further workup is required or recommended. BILIARY:  There is a large laminated  gallstone within the gallbladder lumen.  No evidence of acute cholecystitis or biliary ductal dilatation. PANCREAS:  No lesion, fluid collection, ductal dilatation, or atrophy.  SPLEEN:  No enlargement or focal lesion.  KIDNEYS:  There is a punctate nonobstructing stone within a left inferior pole calyx measuring 3 mm.  No hydronephrosis or hydroureter.  No additional renal lesions. ADRENALS:  No mass or enlargement.  AORTA:  No aneurysmal dilatation.  Moderate atherosclerotic changes of the aorta and iliac vessels. RETROPERITONEUM:  No mass or adenopathy.  BOWEL/MESENTERY:  The stomach and duodenal sweep are unremarkable.  There are multiple dilated loops of small bowel within the left upper to mid abdomen measuring up to 3.4 cm in maximal diameter, secondary to a small bowel containing periumbilical hernia that is slightly increased in size with evidence of secondary small bowel obstruction.  The colon is decompressed with uncomplicated diverticular changes of the descending and sigmoid regions. ABDOMINAL WALL:  As noted above there is a periumbilical hernia containing small bowel which has slightly increased in size from prior imaging currently measuring 7.4 x 11.5 cm, previously measuring approximately 5.9 x 10.3 cm.  Secondary small bowel obstruction.  The small bowel distal to the hernia sac is decompressed.  There is a left-sided indirect inguinal hernia measuring 3.5 x 2.6 cm. URINARY BLADDER:  No visible focal wall thickening, lesion, or calculus.  PELVIC NODES:  No adenopathy.  PELVIC ORGANS:  No visible mass.  Pelvic organs appropriate for patient age.  BONES:  There is an exaggerated thoracic kyphosis.  There is moderate to extensive multilevel disc disease of the thoracolumbar spine with multilevel vacuum disc changes.            CONCLUSION:  1. There is a periumbilical hernia containing mesenteric fat and a loop of small bowel.  There is a secondary small bowel obstruction with the small bowel loops  proximal to the hernia sac measuring up to 3.4 cm.  The small bowel distal to the hernia sac is decompressed. 2. Uncomplicated diverticulosis of the descending and sigmoid colon. 3. Within the chest, there are moderate bilateral pleural effusions which have increased in size from prior imaging.  There is also secondary passive atelectasis along the dependent lung bases as well as suggestion of pulmonary vascular congestion with mild diffuse pulmonary edema. 4. There is cholelithiasis without discrete CT evidence of acute cholecystitis or biliary ductal dilatation. 5. Please see the body of the report above for further, less significant details.    LOCATION:  [place]   Dictated by (CST): Shahida Worthy DO on 9/21/2024 at 0:06 AM     Finalized by (CST): Shahida Worthy DO on 9/21/2024 at 0:20 AM         Medications   morphINE PF 4 MG/ML injection 4 mg ( Intravenous MAR Hold 9/21/24 0122)   lactated ringers infusion ( Intravenous Rate/Dose Change 9/21/24 0430)   lactated ringers IV bolus 500 mL (has no administration in time range)   atropine 0.1 MG/ML injection 0.5 mg (has no administration in time range)   naloxone (Narcan) 0.4 MG/ML injection 0.08 mg (has no administration in time range)   midazolam (Versed) 2 MG/2ML injection 1 mg (has no administration in time range)   meperidine (Demerol) 25 MG/ML injection 12.5 mg (has no administration in time range)   fentaNYL (Sublimaze) 50 mcg/mL injection 25 mcg (has no administration in time range)     Or   fentaNYL (Sublimaze) 50 mcg/mL injection 50 mcg (has no administration in time range)   HYDROmorphone (Dilaudid) 1 MG/ML injection 0.2 mg (has no administration in time range)     Or   HYDROmorphone (Dilaudid) 1 MG/ML injection 0.4 mg (has no administration in time range)     Or   HYDROmorphone (Dilaudid) 1 MG/ML injection 0.6 mg (has no administration in time range)   ondansetron (Zofran) 4 MG/2ML injection 4 mg (has no administration in time range)   metoclopramide  (Reglan) 5 mg/mL injection 10 mg (has no administration in time range)   albuterol (Ventolin) (2.5 MG/3ML) 0.083% nebulizer solution 2.5 mg (has no administration in time range)   ondansetron (Zofran) 4 MG/2ML injection 4 mg (4 mg Intravenous Given 9/20/24 2034)   ondansetron (Zofran) 4 MG/2ML injection 4 mg (4 mg Intravenous Given 9/20/24 2240)   iopamidol 76% (ISOVUE-370) injection for power injector (100 mL Intravenous Given 9/20/24 2326)   furosemide (Lasix) 10 mg/mL injection 40 mg (40 mg Intravenous Given 9/21/24 0106)   lidocaine (Urojet) 2 % urethral jelly 10 mL (10 mL Urethral Given 9/21/24 0106)   furosemide (Lasix) 10 mg/mL injection 10 mg (10 mg Intravenous Given 9/21/24 0438)   furosemide (Lasix) 10 mg/mL injection (has no administration in time range)     I personally reviewed xray films of chest and independent interpretation shows adequate placement of NG tube.  I also reviewed formal xray report as read by radiology with findings below:  Xray of chest read by [facility] radiology shows NG tube placed with tip in the stomach       MDM      Patient is a 74-year-old female presents to ED for evaluation of abdominal pain, shortness of breath.      #1 shortness of breath: Differential CHF, pneumonia, PE, ACS.  Patient had laboratory test performed showing slightly elevated troponin of 36.  proBNP elevated at 9064.  EKG unchanged from prior.  CT chest shows no evidence for pulmonary embolism.  Patient does have fluid overload and pleural effusions.  She was given IV Lasix.  She was placed on nasal cannula as oxygen levels 91%.  Case was discussed with hospitalist    #2 abdominal pain: Differential incarcerated or strangulated hernia, bowel obstruction.  Patient CT scan shows incarcerated hernia with secondary small bowel obstruction.  Based on clinical exam was overlying skin changes, concern for strangulation.  Case discussed with general surgery, Dr. Conley who recommended surgical intervention  tonight for incarcerated possibly strangulated hernia..  Critical care time was 35 minutes. This critical care time is exclusive of procedures critical care time includes monitoring of patient's cardiopulmonary and hemodynamic status, interpretation of laboratory values, and discussion of case with physician and consultants.  NG tube inserted.  Patient went directly to operating room        External and old record review was performed.  I reviewed discharge summary from recent hospitalization.  Reviewed initial CT scan on 9/11 showing potential free air in the right upper quadrant concerning for perforated gastric ulcer.  Reviewed upper GI on 9/16 showing no evidence for perforation                         Medical Decision Making      Disposition and Plan     Clinical Impression:  1. Incarcerated hernia    2. Dyspnea, unspecified type    3. Ventral hernia    4.  CHF  5.  Pleural effusions    Disposition:  Send to or/cath/gi  9/21/2024  1:08 am    Follow-up:  No follow-up provider specified.        Medications Prescribed:  Current Discharge Medication List

## 2024-09-21 NOTE — PLAN OF CARE
Assumed care at 0730  A/O x 4  3-4L nasal cannula, baseline RA. Incentive spirometer given to patient. Lungs clear but diminished.  NSR on tele  VSS  Complains of moderate abdominal pain - PRN tylenol and oxy given per MAR  Wound vac with no output  ALEJANDRINA drain to bulb suction with minimal serosanguinous output  Belly distended, tenderness. BS+, not passing gas or belching.  Tolerating CLD  Cardiac EP no needs  L AC PIV c/d/I - saline locked  SCDs  Ambulating SBA in [facility] for VTE  [place] draining dark yellow urine  All needs met. Needs echo today. Pt updated. Will continue with plan of care.    Problem: GASTROINTESTINAL - ADULT  Goal: Maintains or returns to baseline bowel function  Description: INTERVENTIONS:  - Assess bowel function  - Maintain adequate hydration with IV or PO as ordered and tolerated  - Evaluate effectiveness of GI medications  - Encourage mobilization and activity  - Obtain nutritional consult as needed  - Establish a toileting routine/schedule  - Consider collaborating with pharmacy to review patient's medication profile  Outcome: Progressing  Goal: Maintains adequate nutritional intake (undernourished)  Description: INTERVENTIONS:  - Monitor percentage of each meal consumed  - Identify factors contributing to decreased intake, treat as appropriate  - Assist with meals as needed  - Monitor I&O, WT and lab values  - Obtain nutritional consult as needed  - Optimize oral hygiene and moisture  - Encourage food from home; allow for food preferences  - Enhance eating environment  Outcome: Progressing     Problem: SKIN/TISSUE INTEGRITY - ADULT  Goal: Skin integrity remains intact  Description: INTERVENTIONS  - Assess and document risk factors for pressure ulcer development  - Assess and document skin integrity  - Monitor for areas of redness and/or skin breakdown  - Initiate interventions, skin care algorithm/standards of care as needed  Outcome: Progressing  Goal: Incision(s), wounds(s) or drain  site(s) healing without S/S of infection  Description: INTERVENTIONS:  - Assess and document risk factors for pressure ulcer development  - Assess and document skin integrity  - Assess and document dressing/incision, wound bed, drain sites and surrounding tissue  - Implement wound care per orders  - Initiate isolation precautions as appropriate  - Initiate Pressure Ulcer prevention bundle as indicated  Outcome: Progressing

## 2024-09-21 NOTE — ED INITIAL ASSESSMENT (HPI)
Patient coming in for vomiting and lower abd pain and EVI. Patient was just released yesterday for ulcer perforation, pain started coming back today. Patient was not dx wit any prescriptions.

## 2024-09-21 NOTE — CONSULTS
[facility]  Report of Surgical Consultation with History and Physical Exam    Yudy Lama Patient Status:  Emergency    1950 MRN [id number]   Location [facility] PRE OP HOLDING Attending Selin Conley MD   Hosp Day # 0 PCP Robert Kraus MD     Reason for Surgical Consultation: Incarcerated ventral hernia with bowel obstruction    History of Present Illness:  Yudy Lama is a a(n) 74 year old female who presented to hospital with 1 day history of abdominal pain, nausea, and vomiting.  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  During hospitalization, patient was able to tolerate diet and have bowel function.  She was discharged yesterday.  Patient states since discharge, she has only passed liquid stool.  This morning, she was awakened by pain in her abdomen.  She subsequently developed nausea and vomiting.  During this time, she also had decreased saturations and was coughing quite a bit.  She presented to the emergency room for further evaluation management.    In the emergency room, patient was hemodynamically stable but had an oxygen saturation of 85%.  Serology was done which was all normal.  CT abdomen pelvis was performed which showed worsening of her ventral hernia with a loop of small bowel with proximal bowel dilation and decompressed distal bowel concerning for incarcerated ventral hernia with bowel obstruction.  General surgery was called for further evaluation and management.    On interview, patient reports continued abdominal pain.  NG tube was placed without any relief.  Her abdominal surgeries is significant for  and an open umbilical hernia repair with mesh in .      History:  Past Medical History:    Atherosclerosis of coronary artery    Description: mild, medical therapy.     Atrophic vaginitis    Bundle branch block    Calculus  of gallbladder without cholecystitis without obstruction    Chronic venous insufficiency    Cyst of ovary    Diverticulosis of colon    Left side    Eczema    Gastroesophageal reflux disease with esophagitis    Hx of colonic polyps    Hyperlipidemia, mixed    Hypertension, essential    Mild intermittent asthma without complication (HCC)    Primary generalized (osteo)arthritis    Ventral hernia without obstruction or gangrene    Repaired 2020       Past Surgical History:   Procedure Laterality Date      1980    Colonoscopy  2015    Diverticulosis    Egd N/A 2021    Procedure: ESOPHAGOGASTRODUODENOSCOPY, COLONOSCOPY, POSSIBLE BIOPSY, POSSIBLE POLYPECTOMY 25075, 23151;  Surgeon: Sampson Damon MD;  Location: [facility]    Inguinal hernia repair  1968    Lithotripsy      Ventral hernia repair  2020    1.7 inch Ventralex ST hernia patch       Family History   Problem Relation Age of Onset    Heart Surgery Mother 63    Other (Other) Mother          at 77y/o;  AAA surgery.    Breast Cancer Maternal Grandmother 87        87    Heart Surgery Brother         Coronary stents in 40's; CABG in 50's    Other (Other) Brother         AAA repair        reports that she has quit smoking. She has never used smokeless tobacco. She reports current alcohol use. She reports that she does not use drugs.      Allergies:  No Known Allergies      Medications:    Current Facility-Administered Medications:     [Transfer Hold] morphINE PF 4 MG/ML injection 4 mg, 4 mg, Intravenous, Q30 Min PRN      Review of Systems:  The review of systems was negative other than the above listed HPI and past medical history including the HEENT, Heart, Lungs, GI, , Neuro, Musculoskeletal, Hematologic, Endocrine and Psych.       Physical Exam:  Blood pressure (!) 163/78, pulse 86, temperature 98.4 °F (36.9 °C), resp. rate 21, height 60\", weight 165 lb (74.8 kg), last menstrual period 1995, SpO2 94%, not  currently breastfeeding.  General: Alert, orientated x3.  Cooperative.  Uncomfortable appearing.  HEENT: Exam is unremarkable.  Without scleral icterus.  Mucous membranes are moist. EOM are WNL.  NG tube in place with minimal output  Neck: No tenderness to palpitation.  Full range of motion to flexion and extension, lateral rotation and lateral flexion of cervical spine.  No JVD. Supple.   Lungs: Bilateral chest rise. Good excursion of the diaphragms. No secondary use of accessory respiratory musculature.  Cardiac: Regular rate and rhythm. No murmur.  Abdomen: Soft, nondistended, supraumbilical ventral hernia that is reducible but tender, infraumbilical hernia that is hard, tender, and with overlying skin color changes  Extremities:  No lower extremity edema noted.  Without clubbing or cyanosis.  2+ pulses x4  Skin: Normal texture and turgor.      Laboratory Data and Relevant Imaging:  Recent Labs   Lab 09/16/24  0630 09/18/24  0536 09/20/24 2042   RBC 3.67* 3.68* 4.58   HGB 11.0* 10.9* 13.4   HCT 34.2* 34.4* 42.1   MCV 93.2 93.5 91.9   MCH 30.0 29.6 29.3   MCHC 32.2 31.7 31.8   RDW 13.6 14.2 14.8   NEPRELIM 5.56 4.27 7.71*   WBC 7.2 5.9 9.7   .0 221.0 311.0       Recent Labs   Lab 09/16/24  0630 09/18/24  0536 09/18/24  1517 09/19/24  0505 09/20/24  2042   GLU 79 101*  --   --  108*   BUN 6* <5*  --   --  7*   CREATSERUM 0.56 0.45*  --   --  0.55   CA 8.9 8.8  --   --  10.3   ALB  --   --   --   --  4.2    145  --   --  143   K 3.5  3.5 2.9* 3.4* 4.2 3.8    110  --   --  106   CO2 26.0 29.0  --   --  27.0   ALKPHO  --   --   --   --  113   AST  --   --   --   --  30   ALT  --   --   --   --  64*   BILT  --   --   --   --  1.2*   TP  --   --   --   --  7.6         Recent Labs   Lab 09/20/24 2042   PTP 13.6   INR 1.04       Imaging  CTA CHEST + CT ABD (W) + CT PEL (W) (CPT=71275/64321)    Result Date: 9/21/2024  CONCLUSION:  1. There is a periumbilical hernia containing mesenteric fat and a loop  of small bowel.  There is a secondary small bowel obstruction with the small bowel loops proximal to the hernia sac measuring up to 3.4 cm.  The small bowel distal to the hernia sac is decompressed. 2. Uncomplicated diverticulosis of the descending and sigmoid colon. 3. Within the chest, there are moderate bilateral pleural effusions which have increased in size from prior imaging.  There is also secondary passive atelectasis along the dependent lung bases as well as suggestion of pulmonary vascular congestion with mild diffuse pulmonary edema. 4. There is cholelithiasis without discrete CT evidence of acute cholecystitis or biliary ductal dilatation. 5. Please see the body of the report above for further, less significant details.    LOCATION:  [facility]   Dictated by (CST): Shahida Worthy DO on 9/21/2024 at 0:06 AM     Finalized by (CECIL): Shahida Worthy DO on 9/21/2024 at 0:20 AM          Impression/Plan:  Patient Active Problem List   Diagnosis    Essential hypertension    Mixed hyperlipidemia    Other hemorrhoids    Mild intermittent asthma without complication (HCC)    Colon cancer screening    Eczema    Medicare annual wellness visit, subsequent    Visit for screening mammogram    Fatty liver    Asymptomatic varicose veins of unspecified lower extremity    Calculus of gallbladder without cholecystitis without obstruction    Atherosclerosis of coronary artery    Gastroesophageal reflux disease without esophagitis    Benign hematuria    Atrophic vaginitis    Primary generalized (osteo)arthritis    Irreducible umbilical hernia    Chronic venous insufficiency    History of renal calculi    Post-menopause    Abnormal chest CT    Impaired fasting glucose    Diverticulosis of colon    Hx of colonic polyps    Other irritable bowel syndrome    Well woman exam with routine gynecological exam    Kappa light chain deposition disorder (HCC)    Bronchiectasis without complication (HCC)    Acute gastric ulcer with perforation  (HCC)    Gastric fistula    Incarcerated hernia    Dyspnea, unspecified type       74 year old female with incarcerated ventral hernia with small bowel obstruction    CT images were reviewed personally by me  CT showed an infraumbilical ventral hernia with a loop of small bowel  Small intestines was dilated proximally to it and decompressed distally to the hernia concerning for incarcerated ventral hernia  On physical exam, patient has a hard, tender, and nonreducible infraumbilical hernia with overlying skin color changes including erythema  I recommend proceeding emergently to the operating room for reduction and repair with mesh  I discussed the procedure with the patient, including the use of permanent mesh, using biologic mesh if a bowel resection is done or if there is any compromise  I also discussed with the patient that in light of her newly discovered cholegastric fistula, this will not be evaluated or taken down at the time of surgery, goal of surgery is to alleviate the small bowel obstruction  Risks include bleeding, pain, infection, recurrence, injury to anything in the abdomen, anastomotic leak if we have to do a bowel resection, and need for further intervention  Patient acknowledged understanding and wishes to proceed    Patient will be admitted postoperatively  Surgery will continue to follow  Rest of care per primary    Thank you for letting me participate in the care of this patient    Selin Conley MD  9/21/2024  1:25 AM

## 2024-09-21 NOTE — ANESTHESIA PREPROCEDURE EVALUATION
PRE-OP EVALUATION    Patient Name: Yudy Lama    Admit Diagnosis: No admission diagnoses are documented for this encounter.    Pre-op Diagnosis: Ventral hernia [K43.9]    HERNIA VENTRAL REPAIR WITH MESH POSS. EXPLORATORY LAPAROTOMY POSS. BOWEL RESECTION    Anesthesia Procedure: HERNIA VENTRAL REPAIR WITH MESH POSS. EXPLORATORY LAPAROTOMY POSS. BOWEL RESECTION    Surgeons and Role:     * Selin Conley MD - Primary    Pre-op vitals reviewed.  Temp: 98.4 °F (36.9 °C)  Pulse: 78  Resp: 25  BP: 146/77  SpO2: 93 %  Body mass index is 32.22 kg/m².    Current medications reviewed.  Hospital Medications:   [COMPLETED] furosemide (Lasix) 10 mg/mL injection 40 mg  40 mg Intravenous Once    [COMPLETED] lidocaine (Urojet) 2 % urethral jelly 10 mL  10 mL Urethral Once    [COMPLETED] ondansetron (Zofran) 4 MG/2ML injection 4 mg  4 mg Intravenous Once    morphINE PF 4 MG/ML injection 4 mg  4 mg Intravenous Q30 Min PRN    [COMPLETED] ondansetron (Zofran) 4 MG/2ML injection 4 mg  4 mg Intravenous Once    [COMPLETED] iopamidol 76% (ISOVUE-370) injection for power injector  100 mL Intravenous ONCE PRN       Outpatient Medications:   (Not in a hospital admission)      Allergies: Patient has no known allergies.      Anesthesia Evaluation    Patient summary reviewed.    Anesthetic Complications  (-) history of anesthetic complications         GI/Hepatic/Renal      (+) GERD          (+) liver disease (fatty infiltration)                 Cardiovascular        Exercise tolerance: good     MET: >4      (+) hypertension     (+) CAD                                Endo/Other    Negative endo/other ROS.                              Pulmonary  Comment: Bilateral pleural effusions, supplemental oxygen. Room air saturation (supine) 75%- 92% with 4 LPM NC.    (+) asthma                     Neuro/Psych    Negative neuro/psych ROS.                          Recent hospital admission for perforated gastric ulcer        Past Surgical History:    Procedure Laterality Date      1980    Colonoscopy  2015    Diverticulosis    Egd N/A 2021    Procedure: ESOPHAGOGASTRODUODENOSCOPY, COLONOSCOPY, POSSIBLE BIOPSY, POSSIBLE POLYPECTOMY 43877, 89242;  Surgeon: Sampson Damon MD;  Location: [facility]    Inguinal hernia repair  1968    Lithotripsy      Ventral hernia repair  2020    1.7 inch Ventralex ST hernia patch     Social History     Socioeconomic History    Marital status:    Tobacco Use    Smoking status: Former    Smokeless tobacco: Never   Vaping Use    Vaping status: Never Used   Substance and Sexual Activity    Alcohol use: Yes     Alcohol/week: 0.0 standard drinks of alcohol     Comment: ocassionally    Drug use: No     History   Drug Use No     Available pre-op labs reviewed.  Lab Results   Component Value Date    WBC 9.7 2024    RBC 4.58 2024    HGB 13.4 2024    HCT 42.1 2024    MCV 91.9 2024    MCH 29.3 2024    MCHC 31.8 2024    RDW 14.8 2024    .0 2024     Lab Results   Component Value Date     2024    K 3.8 2024     2024    CO2 27.0 2024    BUN 7 (L) 2024    CREATSERUM 0.55 2024     (H) 2024    CA 10.3 2024            Airway      Mallampati: II  Mouth opening: >3 FB  TM distance: 4 - 6 cm  Neck ROM: full Cardiovascular    Cardiovascular exam normal.         Dental             Pulmonary    Pulmonary exam normal.                 Other findings              ASA: 3 and emergent  Plan: general  NPO status verified and patient meets guidelines.          Plan/risks discussed with: patient                Present on Admission:  **None**

## 2024-09-21 NOTE — ANESTHESIA PROCEDURE NOTES
Regional Block    Performed by: Shailesh Cano MD  Authorized by: Shailesh Cano MD      General Information and Staff    Start Time:   Anesthesiologist:  Shailesh Cano MD  Performed by:  Anesthesiologist  Patient Location:  OR      Site Identification: real time ultrasound guided and image stored and retrievable    Block site/laterality marked before start: site marked  Reason for Block: at surgeon's request and post-op pain management    Preanesthetic Checklist: 2 patient identifers, IV checked, risks and benefits discussed, monitors and equipment checked, pre-op evaluation, timeout performed, anesthesia consent, sterile technique used, no prohibitive neurological deficits and no local skin infection at insertion site      Procedure Details    Patient Position:  Supine  Prep: ChloraPrep    Monitoring:  Cardiac monitor, continuous pulse ox and blood pressure cuff  Block Type:  TAP  Laterality:  Bilateral  Injection Technique:  Single-shot    Needle    Needle Type:  Short-bevel and echogenic  Needle Localization:  Ultrasound guidance  Reason for Ultrasound Use: appropriate spread of the medication was noted in real time and no ultrasound evidence of intravascular and/or intraneural injection            Assessment    Injection Assessment:  Good spread noted, negative resistance, negative aspiration for heme, incremental injection and low pressure  Heart Rate Change: No    - Patient tolerated block procedure well without evidence of immediate block related complications.     Medications      Additional Comments    60 mL 0.5% ropivacaine w/ 4 mg PF dexamethasone divided equally between bilateral injections.

## 2024-09-21 NOTE — ED QUICK NOTES
Orders for admission, patient is aware of plan and ready to go upstairs. Any questions, please call ED RN Janki at extension [phone].     Patient Covid vaccination status: Fully vaccinated     COVID Test Ordered in ED: SARS-CoV-2/Flu A and B/RSV by PCR (GeneXpert)    COVID Suspicion at Admission: N/A    Running Infusions:      Mental Status/LOC at time of transport: A&Ox4    Other pertinent information:   CIWA score: N/A   NIH score:  N/A

## 2024-09-21 NOTE — PROGRESS NOTES
NURSING ADMISSION NOTE      Patient admitted via Cart  Oriented to room.  Safety precautions initiated.  Bed in low position.  Call light in reach.    Assumed care at 0540. A&Ox4. 4L NC. NSR on tele. Rosales in place, draining. Incision to ABD, wound vac sponge and ALEJANDRINA drain in place. Fall precautions in place. Family at the bedside. Call light within reach, bed in lowest position. Will continue with plan of care.

## 2024-09-21 NOTE — ANESTHESIA POSTPROCEDURE EVALUATION
[facility]    Yudy Lama Patient Status:  Emergency   Age/Gender 74 year old female MRN [id number]   Location [facility] SURGERY Attending Slein Conley MD   Hosp Day # 0 PCP Robert Kraus MD       Anesthesia Post-op Note    HERNIA VENTRAL REPAIR WITH MESH POSS. EXPLORATORY LAPAROTOMY POSS. BOWEL RESECTION    Procedure Summary       Date: 09/21/24 Room / Location:  MAIN OR 08 / [facility] MAIN OR    Anesthesia Start: 0148 Anesthesia Stop: 0431    Procedure: HERNIA VENTRAL REPAIR WITH MESH POSS. EXPLORATORY LAPAROTOMY POSS. BOWEL RESECTION (Abdomen) Diagnosis:       Ventral hernia      (Ventral hernia [K43.9])    Surgeons: Selin Conley MD Anesthesiologist: Shailesh Cano MD    Anesthesia Type: general ASA Status: 3 - Emergent            Anesthesia Type: general    Vitals Value Taken Time   /68 09/21/24 0431   Temp 98.1 °F (36.7 °C) 09/21/24 0431   Pulse 90 09/21/24 0431   Resp 20 09/21/24 0431   SpO2 92 % 09/21/24 0431   Vitals shown include unfiled device data.    Patient Location: PACU    Anesthesia Type: general    Airway Patency: patent and extubated    Postop Pain Control: adequate    Mental Status: mildly sedated but able to meaningfully participate in the post-anesthesia evaluation    Nausea/Vomiting: none    Cardiopulmonary/Hydration status: stable euvolemic    Complications: no apparent anesthesia related complications    Postop vital signs: stable    Dental Exam: Unchanged from Preop    Patient to be discharged from PACU when criteria met.

## 2024-09-22 LAB
ALBUMIN SERPL-MCNC: 3.5 G/DL (ref 3.2–4.8)
ALBUMIN/GLOB SERPL: 1.2 {RATIO} (ref 1–2)
ALP LIVER SERPL-CCNC: 92 U/L
ALT SERPL-CCNC: 34 U/L
ANION GAP SERPL CALC-SCNC: 5 MMOL/L (ref 0–18)
AST SERPL-CCNC: 21 U/L (ref ?–34)
ATRIAL RATE: 80 BPM
BASOPHILS # BLD AUTO: 0.03 X10(3) UL (ref 0–0.2)
BASOPHILS NFR BLD AUTO: 0.3 %
BILIRUB SERPL-MCNC: 0.7 MG/DL (ref 0.2–1.1)
BUN BLD-MCNC: 11 MG/DL (ref 9–23)
CALCIUM BLD-MCNC: 9.4 MG/DL (ref 8.7–10.4)
CHLORIDE SERPL-SCNC: 104 MMOL/L (ref 98–112)
CO2 SERPL-SCNC: 32 MMOL/L (ref 21–32)
CREAT BLD-MCNC: 0.59 MG/DL
EGFRCR SERPLBLD CKD-EPI 2021: 93 ML/MIN/1.73M2 (ref 60–?)
EOSINOPHIL # BLD AUTO: 0.34 X10(3) UL (ref 0–0.7)
EOSINOPHIL NFR BLD AUTO: 3.4 %
ERYTHROCYTE [DISTWIDTH] IN BLOOD BY AUTOMATED COUNT: 14.8 %
GLOBULIN PLAS-MCNC: 2.9 G/DL (ref 2–3.5)
GLUCOSE BLD-MCNC: 93 MG/DL (ref 70–99)
HCT VFR BLD AUTO: 36.6 %
HGB BLD-MCNC: 11.6 G/DL
IMM GRANULOCYTES # BLD AUTO: 0.05 X10(3) UL (ref 0–1)
IMM GRANULOCYTES NFR BLD: 0.5 %
LYMPHOCYTES # BLD AUTO: 1.1 X10(3) UL (ref 1–4)
LYMPHOCYTES NFR BLD AUTO: 11 %
MAGNESIUM SERPL-MCNC: 1.5 MG/DL (ref 1.6–2.6)
MCH RBC QN AUTO: 29.9 PG (ref 26–34)
MCHC RBC AUTO-ENTMCNC: 31.7 G/DL (ref 31–37)
MCV RBC AUTO: 94.3 FL
MONOCYTES # BLD AUTO: 1.09 X10(3) UL (ref 0.1–1)
MONOCYTES NFR BLD AUTO: 10.9 %
NEUTROPHILS # BLD AUTO: 7.43 X10 (3) UL (ref 1.5–7.7)
NEUTROPHILS # BLD AUTO: 7.43 X10(3) UL (ref 1.5–7.7)
NEUTROPHILS NFR BLD AUTO: 73.9 %
OSMOLALITY SERPL CALC.SUM OF ELEC: 291 MOSM/KG (ref 275–295)
P AXIS: 47 DEGREES
P-R INTERVAL: 116 MS
PHOSPHATE SERPL-MCNC: 4.6 MG/DL (ref 2.4–5.1)
PLATELET # BLD AUTO: 248 10(3)UL (ref 150–450)
POTASSIUM SERPL-SCNC: 4 MMOL/L (ref 3.5–5.1)
POTASSIUM SERPL-SCNC: 4 MMOL/L (ref 3.5–5.1)
PROT SERPL-MCNC: 6.4 G/DL (ref 5.7–8.2)
Q-T INTERVAL: 404 MS
QRS DURATION: 110 MS
QTC CALCULATION (BEZET): 465 MS
R AXIS: 34 DEGREES
RBC # BLD AUTO: 3.88 X10(6)UL
SODIUM SERPL-SCNC: 141 MMOL/L (ref 136–145)
T AXIS: 70 DEGREES
VENTRICULAR RATE: 80 BPM
WBC # BLD AUTO: 10 X10(3) UL (ref 4–11)

## 2024-09-22 PROCEDURE — 99232 SBSQ HOSP IP/OBS MODERATE 35: CPT | Performed by: INTERNAL MEDICINE

## 2024-09-22 RX ORDER — MAGNESIUM OXIDE 400 MG/1
800 TABLET ORAL ONCE
Status: COMPLETED | OUTPATIENT
Start: 2024-09-22 | End: 2024-09-22

## 2024-09-22 RX ORDER — FUROSEMIDE 10 MG/ML
40 INJECTION INTRAMUSCULAR; INTRAVENOUS ONCE
Status: COMPLETED | OUTPATIENT
Start: 2024-09-22 | End: 2024-09-22

## 2024-09-22 NOTE — PLAN OF CARE
Assumed care at 1930. A&Ox4. 3L NC. IS at the bedside. NSR on tele. CLD. Cardiac electrolyte protocol. PIV L AC SL. Rosales in place. Up stand by. SCDs on. ABD distended, incision to midline present. ALEJANDRINA drain and wound vac in place. Pt c/o pain, PRN Oxycodone given per MAR. Call light within reach, bed in lowest position. Will continue with plan of care.      Problem: GASTROINTESTINAL - ADULT  Goal: Maintains or returns to baseline bowel function  Description: INTERVENTIONS:  - Assess bowel function  - Maintain adequate hydration with IV or PO as ordered and tolerated  - Evaluate effectiveness of GI medications  - Encourage mobilization and activity  - Obtain nutritional consult as needed  - Establish a toileting routine/schedule  - Consider collaborating with pharmacy to review patient's medication profile  Outcome: Progressing  Goal: Maintains adequate nutritional intake (undernourished)  Description: INTERVENTIONS:  - Monitor percentage of each meal consumed  - Identify factors contributing to decreased intake, treat as appropriate  - Assist with meals as needed  - Monitor I&O, WT and lab values  - Obtain nutritional consult as needed  - Optimize oral hygiene and moisture  - Encourage food from home; allow for food preferences  - Enhance eating environment  Outcome: Progressing     Problem: SKIN/TISSUE INTEGRITY - ADULT  Goal: Skin integrity remains intact  Description: INTERVENTIONS  - Assess and document risk factors for pressure ulcer development  - Assess and document skin integrity  - Monitor for areas of redness and/or skin breakdown  - Initiate interventions, skin care algorithm/standards of care as needed  Outcome: Progressing  Goal: Incision(s), wounds(s) or drain site(s) healing without S/S of infection  Description: INTERVENTIONS:  - Assess and document risk factors for pressure ulcer development  - Assess and document skin integrity  - Assess and document dressing/incision, wound bed, drain sites and  surrounding tissue  - Implement wound care per orders  - Initiate isolation precautions as appropriate  - Initiate Pressure Ulcer prevention bundle as indicated  Outcome: Progressing

## 2024-09-22 NOTE — PROGRESS NOTES
[facility]   part of [facility]     Hospitalist Progress Note     Yudy Lama Patient Status:  Inpatient    1950 MRN [id number]   Location [facility] 3NE-A Attending Antonio Mcmillan MD   Hosp Day # 1 PCP Robert Kraus MD     Chief Complaint: Abdominal pain    Subjective:     Patient felt a little bloated yesterday but better today  Breathing much better     Objective:    Review of Systems:   A comprehensive review of systems was completed; pertinent positive and negatives stated in subjective.    Vital signs:  Temp:  [97.7 °F (36.5 °C)-98.9 °F (37.2 °C)] 98.6 °F (37 °C)  Pulse:  [66-79] 71  Resp:  [18-24] 21  BP: (107-130)/(57-70) 125/69  SpO2:  [91 %-93 %] 93 %    Physical Exam:    General: No acute distress  Respiratory: basilar fine crackles   Cardiovascular: S1, S2, regular rate and rhythm  Abdomen: midline incision bandaged,  wound vac, ALEJANDRINA drain with SS output   Neuro: No new focal deficits.   Extremities: No edema      Diagnostic Data:    Labs:  Recent Labs   Lab 24  0630 24  0536 24  1010 24  0630   WBC 7.2 5.9 9.7 12.7* 10.0   HGB 11.0* 10.9* 13.4 12.4 11.6*   MCV 93.2 93.5 91.9 92.9 94.3   .0 221.0 311.0 274.0 248.0   INR  --   --  1.04  --   --        Recent Labs   Lab 24  0536 24  1517 24  0505 24  1010   *  --   --  108* 171*   BUN <5*  --   --  7* 7*   CREATSERUM 0.45*  --   --  0.55 0.62   CA 8.8  --   --  10.3 9.5   ALB  --   --   --  4.2 3.5     --   --  143 142   K 2.9*   < > 4.2 3.8 3.8     --   --  106 106   CO2 29.0  --   --  27.0 26.0   ALKPHO  --   --   --  113 107   AST  --   --   --  30 27   ALT  --   --   --  64* 50*   BILT  --   --   --  1.2* 0.8   TP  --   --   --  7.6 7.0    < > = values in this interval not displayed.       Estimated Creatinine Clearance: 55.4 mL/min (based on SCr of 0.62 mg/dL).    Recent Labs   Lab 24  1010   TROPHS  36* 26       Recent Labs   Lab 09/20/24 2042   PTP 13.6   INR 1.04                  Microbiology    No results found for this visit on 09/20/24.      Imaging: Reviewed in Epic.    Medications:    lisinopril  10 mg Oral Daily    fenofibrate micronized  134 mg Oral Daily with breakfast    pantoprazole  40 mg Oral QAM AC    rosuvastatin  5 mg Oral Nightly    fluticasone-salmeterol  2 puff Inhalation BID    enoxaparin  40 mg Subcutaneous Nightly       Assessment & Plan:      #Incarcerated ventral hernia with small bowel obstruction s/p Exploratory Laparotomy, hernia repair, appendectomy  -Date of surgery 9/21/2024  -Diet per surgery  -remove [place] when okay with surgery   -Pain control      #Pulmonary edema, Acute on chronic HFpEF  -ECHO notable for diastolic dysfunction  -improved with diuresis, additional dose of Lasix today    #Acute hypoxic respiratory failure 2/2 above + Atelectasis   -Diurese as tolerated  -IS and ambulation encouraged   -Wean O2    #Essential hypertension  -PTA: Lisinopril    #Hyperlipidemia  -PTA: Rosuvastatin, fenofibrate     #GERD  -PTA: PPI    #Mild intermittent asthma  -Advair  -prn albuterol       Michel Denis DO    Supplementary Documentation:     Quality:  DVT Mechanical Prophylaxis:   SCDs, Early ambuation  DVT Pharmacologic Prophylaxis   Medication    enoxaparin (Lovenox) 40 MG/0.4ML SUBQ injection 40 mg                Code Status: Full Code  [place]: [place] catheter in place  [place] Duration (in days): 2    The 21st Century Cures Act makes medical notes like these available to patients in the interest of transparency. Please be advised this is a medical document. Medical documents are intended to carry relevant information, facts as evident, and the clinical opinion of the practitioner. The medical note is intended as peer to peer communication and may appear blunt or direct. It is written in medical language and may contain abbreviations or verbiage that are unfamiliar.               **Certification      PHYSICIAN Certification of Need for Inpatient Hospitalization - Initial Certification    Patient will require inpatient services that will reasonably be expected to span two midnight's based on the clinical documentation in H+P.   Based on patients current state of illness, I anticipate that, after discharge, patient will require TBD.

## 2024-09-22 NOTE — PROGRESS NOTES
[facility]  Progress Note    Yudy Lama Patient Status:  Inpatient    1950 MRN [id number]   Location [facility] 3NE-A Attending Antonio Mcmillan MD   Hosp Day # 1 PCP Robert Kraus MD     Subjective:  She is seen and examined resting in bed with family at bedside. She reports doing well today. She is tolerating clear liquids. She denies passing flatus or a bowel movement, but reports feeling \"rumbling\".     Urine output yesterday 1,850   Objective/Physical Exam:  /70 (BP Location: Right arm)   Pulse 74   Temp 98.3 °F (36.8 °C) (Oral)   Resp 18   Ht 60\"   Wt 164 lb 15.9 oz (74.8 kg)   LMP 1995   SpO2 93%   BMI 32.22 kg/m²     Intake/Output Summary (Last 24 hours) at 2024 1158  Last data filed at 2024 0730  Gross per 24 hour   Intake 420 ml   Output 1750 ml   Net -1330 ml         General: Awake, Alert,  Cooperative.  No apparent distress.  Pulm: no respiratory distress, no increased work of breathing. Nasal canula in place  Abdomen:  Soft, non-distended,appropriate tenderness, with no rebound or guarding.  No peritoneal signs.  Rosales catheter in place.   Incision:  Clean, dry, intact without erythema.  Prevena wound vac in place  Drains: Santiago drain with serosanguinous output     Labs:  Lab Results   Component Value Date    WBC 10.0 2024    HGB 11.6 2024    HCT 36.6 2024    .0 2024      Lab Results   Component Value Date    INR 1.04 2024    INR 1.0 2020    INR 1 2011     Lab Results   Component Value Date     2024    K 4.0 2024    K 4.0 2024     2024    CO2 32.0 2024    BUN 11 2024    CREATSERUM 0.59 2024    GLU 93 2024    CA 9.4 2024    ALKPHO 92 2024    ALT 34 2024    AST 21 2024    BILT 0.7 2024    ALB 3.5 2024    TP 6.4 2024            Assessment:  Patient Active Problem List   Diagnosis    Essential hypertension     Mixed hyperlipidemia    Other hemorrhoids    Mild intermittent asthma without complication (HCC)    Colon cancer screening    Eczema    Medicare annual wellness visit, subsequent    Visit for screening mammogram    Fatty liver    Asymptomatic varicose veins of unspecified lower extremity    Calculus of gallbladder without cholecystitis without obstruction    Atherosclerosis of coronary artery    Gastroesophageal reflux disease without esophagitis    Benign hematuria    Atrophic vaginitis    Primary generalized (osteo)arthritis    Irreducible umbilical hernia    Chronic venous insufficiency    History of renal calculi    Post-menopause    Abnormal chest CT    Impaired fasting glucose    Diverticulosis of colon    Hx of colonic polyps    Other irritable bowel syndrome    Well woman exam with routine gynecological exam    Kappa light chain deposition disorder (HCC)    Bronchiectasis without complication (HCC)    Acute gastric ulcer with perforation (HCC)    Gastric fistula    Incarcerated hernia    Dyspnea, unspecified type       POD 1 exploratory laparotomy, open ventral hernia repair with biologic mesh, appendectomy, partial omentectomy, mesh explantation     Plan:  Continue clear liquid diet; await return of bowel function prior to diet advancement  Intraoperative findings discussed with the patient   Okay to keep [place] given diuretic, but can discontinue afterwards   Encourage ambulation and up to chair- add abdominal binder for comfort   Encourage incentive spirometer  Continue prevena wound vac for 5 days post op   Medical management per primary  DVT prophylaxis with lovenox  GI prophylaxis with protonix     Ella Sanchez PA-C  9/22/2024  11:58 AM    This note was initiated by Ella Sanchez.  The PA saw the patient in conjunction with me. The PA performed a history, exam, and developed the assessment and plan in conjunction with me. I agree with the above note and have made changes which reflect my own history  and physical, if necessary.    Having gas pain but no flatus or bowel movements  Tolerating clear liquids without any nausea  Pain is controlled  Ambulating  Urine output increased to 1800, receiving diuretics per primary  Remove [place]  Abdominal binder  Will advance diet once patient having more bowel function    Selin Conley MD  [facility] [place]  9/22/2024  5:30 PM

## 2024-09-22 NOTE — PLAN OF CARE
Assumed care at 0730  A/O x 4  2L nasal cannula. IS at bedside. Pt ambulating in halls. Lungs clear but diminished.   NSR on tele  VSS  Reports 5-6/10 abdominal pain - PRN oxy given per MAR  CLD - BS+, now belching. Not passing gas yet.  ALEJANDRINA drain with minimal serosanguinous output. Wound vac with no output noted.  Cardiac EP - Mg replaced today  Takes pills whole with water  Up SBA  [place] draining dark yellow urine  L AC PIV saline locked  Lovenox for VTE  All needs met. Pt updated. Will continue with plan of care.    1420: [place] removed  1715: pt voided 250 ml, PVR negative.     Problem: GASTROINTESTINAL - ADULT  Goal: Maintains or returns to baseline bowel function  Description: INTERVENTIONS:  - Assess bowel function  - Maintain adequate hydration with IV or PO as ordered and tolerated  - Evaluate effectiveness of GI medications  - Encourage mobilization and activity  - Obtain nutritional consult as needed  - Establish a toileting routine/schedule  - Consider collaborating with pharmacy to review patient's medication profile  Outcome: Progressing  Goal: Maintains adequate nutritional intake (undernourished)  Description: INTERVENTIONS:  - Monitor percentage of each meal consumed  - Identify factors contributing to decreased intake, treat as appropriate  - Assist with meals as needed  - Monitor I&O, WT and lab values  - Obtain nutritional consult as needed  - Optimize oral hygiene and moisture  - Encourage food from home; allow for food preferences  - Enhance eating environment  Outcome: Progressing     Problem: SKIN/TISSUE INTEGRITY - ADULT  Goal: Skin integrity remains intact  Description: INTERVENTIONS  - Assess and document risk factors for pressure ulcer development  - Assess and document skin integrity  - Monitor for areas of redness and/or skin breakdown  - Initiate interventions, skin care algorithm/standards of care as needed  Outcome: Progressing  Goal: Incision(s), wounds(s) or drain site(s) healing  without S/S of infection  Description: INTERVENTIONS:  - Assess and document risk factors for pressure ulcer development  - Assess and document skin integrity  - Assess and document dressing/incision, wound bed, drain sites and surrounding tissue  - Implement wound care per orders  - Initiate isolation precautions as appropriate  - Initiate Pressure Ulcer prevention bundle as indicated  Outcome: Progressing

## 2024-09-23 LAB
ANION GAP SERPL CALC-SCNC: 6 MMOL/L (ref 0–18)
BUN BLD-MCNC: 10 MG/DL (ref 9–23)
CALCIUM BLD-MCNC: 9.7 MG/DL (ref 8.7–10.4)
CHLORIDE SERPL-SCNC: 100 MMOL/L (ref 98–112)
CO2 SERPL-SCNC: 33 MMOL/L (ref 21–32)
CREAT BLD-MCNC: 0.58 MG/DL
EGFRCR SERPLBLD CKD-EPI 2021: 94 ML/MIN/1.73M2 (ref 60–?)
GLUCOSE BLD-MCNC: 107 MG/DL (ref 70–99)
MAGNESIUM SERPL-MCNC: 1.6 MG/DL (ref 1.6–2.6)
MAGNESIUM SERPL-MCNC: 1.7 MG/DL (ref 1.6–2.6)
OSMOLALITY SERPL CALC.SUM OF ELEC: 288 MOSM/KG (ref 275–295)
POTASSIUM SERPL-SCNC: 3.6 MMOL/L (ref 3.5–5.1)
SODIUM SERPL-SCNC: 139 MMOL/L (ref 136–145)

## 2024-09-23 PROCEDURE — 99232 SBSQ HOSP IP/OBS MODERATE 35: CPT | Performed by: INTERNAL MEDICINE

## 2024-09-23 RX ORDER — FUROSEMIDE 10 MG/ML
40 INJECTION INTRAMUSCULAR; INTRAVENOUS ONCE
Status: COMPLETED | OUTPATIENT
Start: 2024-09-23 | End: 2024-09-23

## 2024-09-23 RX ORDER — POTASSIUM CHLORIDE 1500 MG/1
40 TABLET, EXTENDED RELEASE ORAL EVERY 4 HOURS
Status: COMPLETED | OUTPATIENT
Start: 2024-09-23 | End: 2024-09-23

## 2024-09-23 RX ORDER — MAGNESIUM OXIDE 400 MG/1
400 TABLET ORAL ONCE
Status: COMPLETED | OUTPATIENT
Start: 2024-09-23 | End: 2024-09-23

## 2024-09-23 NOTE — PLAN OF CARE
Assumed care at 2330. A/Ox4, on 2L oxygen, NSR on tele. Lovenox for VTE. Clear liquid diet. Ambulates, voids. ALEJANDRINA drain c/d/I. Wound vac in place. PIV saline locked. All needs met at this time.     Problem: GASTROINTESTINAL - ADULT  Goal: Maintains or returns to baseline bowel function  Description: INTERVENTIONS:  - Assess bowel function  - Maintain adequate hydration with IV or PO as ordered and tolerated  - Evaluate effectiveness of GI medications  - Encourage mobilization and activity  - Obtain nutritional consult as needed  - Establish a toileting routine/schedule  - Consider collaborating with pharmacy to review patient's medication profile  Outcome: Progressing  Goal: Maintains adequate nutritional intake (undernourished)  Description: INTERVENTIONS:  - Monitor percentage of each meal consumed  - Identify factors contributing to decreased intake, treat as appropriate  - Assist with meals as needed  - Monitor I&O, WT and lab values  - Obtain nutritional consult as needed  - Optimize oral hygiene and moisture  - Encourage food from home; allow for food preferences  - Enhance eating environment  Outcome: Progressing     Problem: SKIN/TISSUE INTEGRITY - ADULT  Goal: Skin integrity remains intact  Description: INTERVENTIONS  - Assess and document risk factors for pressure ulcer development  - Assess and document skin integrity  - Monitor for areas of redness and/or skin breakdown  - Initiate interventions, skin care algorithm/standards of care as needed  Outcome: Progressing  Goal: Incision(s), wounds(s) or drain site(s) healing without S/S of infection  Description: INTERVENTIONS:  - Assess and document risk factors for pressure ulcer development  - Assess and document skin integrity  - Assess and document dressing/incision, wound bed, drain sites and surrounding tissue  - Implement wound care per orders  - Initiate isolation precautions as appropriate  - Initiate Pressure Ulcer prevention bundle as  indicated  Outcome: Progressing

## 2024-09-23 NOTE — DIETARY NOTE
[facility]   part of [facility]    NUTRITION ASSESSMENT    Pt does not meet malnutrition criteria at this time.      NUTRITION INTERVENTION:    Meal and Snacks - Monitor and encourage adequate PO intake.   Medical Food Supplements - Ensure Plus High Protein BID. Rationale/use for oral supplements discussed.  Once PO improved, can decrease to 1x daily  Nutrition Education - Discussed Low Fiber Diet. Discussed use of ONS. Pt/family receptive to education, no barriers noted. Handouts provided.       PATIENT STATUS: 09/23/24 74/F admitted with incarcerated hernia. PMH includes HLD, HTN, CAD, GERD, Diverticulosis. Pt recently admitted to hospital with perf'd gastric ulcer.  Pt seen for MST.  Pt reports poor intake for a few weeks due to symptoms, and treatment while in hospital.  POD#2 for ex lap with hernia repair and appy.  Pt feeling well, has an appetite currently. No n/v/d. + Flatus, no BM.  Unsure of any wt changes ,no obvious wasting noted.  Discussed importance of nutrition for repletion and for healing.  Discussed use of ONS - she is agreeable. Tolerated Ensure in the past.  Discussed Low Fiber diet. Handouts provided.  All questions answered at time of visit.       ANTHROPOMETRICS:  Ht: 152.4 cm (5')  Wt: 74.8 kg (164 lb 15.9 oz).   BMI: Body mass index is 32.22 kg/m².  IBW: 45.5 kg      WEIGHT HISTORY:   Weight loss:  unknown    Wt Readings from Last 10 Encounters:   09/21/24 74.8 kg (164 lb 15.9 oz)   09/11/24 71.2 kg (157 lb)   03/10/22 82.1 kg (181 lb)   01/19/22 82.1 kg (181 lb)   12/08/21 79.8 kg (176 lb)   11/15/21 79.8 kg (176 lb)   11/03/21 78.5 kg (173 lb)   10/15/21 80.2 kg (176 lb 12.8 oz)   09/21/21 79.8 kg (176 lb)   05/05/21 74.4 kg (164 lb)        NUTRITION:  Diet:       Procedures    Full liquid diet Is Patient on Accuchecks? No      Food Allergies: No  Cultural/Ethnic/Adventism Preferences Addressed: Yes    Percent Meals Eaten (last 3 days)       Date/Time Percent Meals Eaten (%)     09/23/24 0953 100 %            GI system review: WNL Last BM Date: 09/18/24  Skin and wounds: surgical wound to abdomen with wound vac    NUTRITION RELATED PHYSICAL FINDINGS:     1. Body Fat/Muscle Mass: no wasting noted / well nourished     2. Fluid Accumulation: none     NUTRITION PRESCRIPTION: 45.5 kg (IBW)  Calories: 4221-0171 calories/day (25-30 kcal/kg)  Protein: 68-91 grams protein/day (1.5-2.0 grams protein per kg)  Fluid: ~1 ml/kcal or per MD discretion    NUTRITION DIAGNOSIS/PROBLEM:  Increased nutrient needs related to increased nutritional demands for healing as evidenced by diagnosis consistent with elevated nutritional needs      MONITOR AND EVALUATE/NUTRITION GOALS:  PO intake of 75% of meals TID - New  PO intake of 75% of oral nutrition supplement/s - New  Weight stable within 1 to 2 lbs during admission - New  Provide nutrition adequate for wound healing - New      MEDICATIONS:  Reviewed    LABS:  Reviewed    Pt is at Moderate nutrition risk    Tamia Nguyen RD, LDN, CNSC  Clinical Dietitian  Phone [phone]

## 2024-09-23 NOTE — PROGRESS NOTES
[facility]   part of [facility]     Hospitalist Progress Note     Yudy Lama Patient Status:  Inpatient    1950 MRN [id number]   Location [facility] 3NE-A Attending Antonio Mcmillan MD   Hosp Day # 2 PCP Robert Kraus MD     Chief Complaint: Abdominal pain    Subjective:     Feels well, about to advance to full liquid diet   Breathing is improved     Objective:    Review of Systems:   A comprehensive review of systems was completed; pertinent positive and negatives stated in subjective.    Vital signs:  Temp:  [98 °F (36.7 °C)-98.6 °F (37 °C)] 98.6 °F (37 °C)  Pulse:  [68-94] 75  Resp:  [18-26] 18  BP: (106-119)/(51-66) 108/56  SpO2:  [92 %-94 %] 93 %    Physical Exam:    General: No acute distress  Respiratory: basilar fine crackles   Cardiovascular: S1, S2, regular rate and rhythm  Abdomen: midline incision bandaged,  wound vac, ALEJANDRINA drain with SS output   Neuro: No new focal deficits.   Extremities: No edema      Diagnostic Data:    Labs:  Recent Labs   Lab 24  0536 24  1010 24  0630   WBC 5.9 9.7 12.7* 10.0   HGB 10.9* 13.4 12.4 11.6*   MCV 93.5 91.9 92.9 94.3   .0 311.0 274.0 248.0   INR  --  1.04  --   --        Recent Labs   Lab 24  1010 24  0630   * 171* 93   BUN 7* 7* 11   CREATSERUM 0.55 0.62 0.59   CA 10.3 9.5 9.4   ALB 4.2 3.5 3.5    142 141   K 3.8 3.8 4.0  4.0    106 104   CO2 27.0 26.0 32.0   ALKPHO 113 107 92   AST 30 27 21   ALT 64* 50* 34   BILT 1.2* 0.8 0.7   TP 7.6 7.0 6.4       Estimated Creatinine Clearance: 58.3 mL/min (based on SCr of 0.59 mg/dL).    Recent Labs   Lab 24  1010   TROPHS 36* 26       Recent Labs   Lab 24   PTP 13.6   INR 1.04                  Microbiology    No results found for this visit on 24.      Imaging: Reviewed in Epic.    Medications:    magnesium oxide  400 mg Oral Once    lisinopril  10 mg Oral Daily    fenofibrate  micronized  134 mg Oral Daily with breakfast    pantoprazole  40 mg Oral QAM AC    rosuvastatin  5 mg Oral Nightly    fluticasone-salmeterol  2 puff Inhalation BID    enoxaparin  40 mg Subcutaneous Nightly       Assessment & Plan:      #Incarcerated ventral hernia with small bowel obstruction s/p Exploratory Laparotomy, hernia repair, appendectomy  -Date of surgery 9/21/2024  -Diet per surgery - FLD  -Rosales removed   -Pain control      #Pulmonary edema, Acute on chronic HFpEF  -ECHO notable for diastolic dysfunction  -improved with diuresis    #Acute hypoxic respiratory failure 2/2 above + Atelectasis   -Diurese as tolerated  -IS and ambulation encouraged   -Wean O2 as able, Ambulatory O2 walk     #Essential hypertension  -PTA: Lisinopril    #Hyperlipidemia  -PTA: Rosuvastatin, fenofibrate     #GERD  -PTA: PPI    #Mild intermittent asthma  -Advair  -prn albuterol       Michel Denis, DO    Supplementary Documentation:     Quality:  DVT Mechanical Prophylaxis:   SCDs, Early ambuation  DVT Pharmacologic Prophylaxis   Medication    enoxaparin (Lovenox) 40 MG/0.4ML SUBQ injection 40 mg                Code Status: Full Code  Rosales: No urinary catheter in place  Rosales Duration (in days): 2    The 21st Century Cures Act makes medical notes like these available to patients in the interest of transparency. Please be advised this is a medical document. Medical documents are intended to carry relevant information, facts as evident, and the clinical opinion of the practitioner. The medical note is intended as peer to peer communication and may appear blunt or direct. It is written in medical language and may contain abbreviations or verbiage that are unfamiliar.              **Certification      PHYSICIAN Certification of Need for Inpatient Hospitalization - Initial Certification    Patient will require inpatient services that will reasonably be expected to span two midnight's based on the clinical documentation in H+P.   Based  on patients current state of illness, I anticipate that, after discharge, patient will require TBD.

## 2024-09-23 NOTE — PROGRESS NOTES
[facility]  Progress Note    Yudy Lama Patient Status:  Inpatient    1950 MRN [id number]   Location [facility] 3NE-A Attending Antonio Mcmillan MD   Hosp Day # 2 PCP Robert Kraus MD     Subjective:  Patient is sitting comfortably in chair.  She states she overall is feeling well today.  She reports minimal incisional discomfort.  She is tolerating a clear liquid diet without nausea or vomiting.  She is passing flatus.  Denies any fevers or chills.    Objective/Physical Exam:  General: Alert, orientated x3.  Cooperative.  No apparent distress.  Vital Signs:  Blood pressure 108/56, pulse 75, temperature 98.6 °F (37 °C), temperature source Oral, resp. rate 18, height 60\", weight 164 lb 15.9 oz (74.8 kg), last menstrual period 1995, SpO2 93%, not currently breastfeeding.  Lungs: No respiratory distress.  Cardiac: Regular rate and rhythm.   Abdomen:  Soft, mildly distended, appropriate incisional tenderness, with no rebound or guarding.  No peritoneal signs.   Extremities:  No lower extremity edema noted.    Incision: Prevena VAC clean, dry, intact  Drain: serosang    Labs:        Lab Results   Component Value Date    INR 1.04 2024    INR 1.0 2020    INR 1 2011       I/O last 3 completed shifts:  In: 620 [P.O.:620]  Out: 1705 [Urine:1650; Drains:55]  No intake/output data recorded.    Assessment  Patient Active Problem List   Diagnosis    Essential hypertension    Mixed hyperlipidemia    Other hemorrhoids    Mild intermittent asthma without complication (HCC)    Colon cancer screening    Eczema    Medicare annual wellness visit, subsequent    Visit for screening mammogram    Fatty liver    Asymptomatic varicose veins of unspecified lower extremity    Calculus of gallbladder without cholecystitis without obstruction    Atherosclerosis of coronary artery    Gastroesophageal reflux disease without esophagitis    Benign hematuria    Atrophic vaginitis    Primary generalized  (osteo)arthritis    Irreducible umbilical hernia    Chronic venous insufficiency    History of renal calculi    Post-menopause    Abnormal chest CT    Impaired fasting glucose    Diverticulosis of colon    Hx of colonic polyps    Other irritable bowel syndrome    Well woman exam with routine gynecological exam    Kappa light chain deposition disorder (HCC)    Bronchiectasis without complication (HCC)    Acute gastric ulcer with perforation (HCC)    Gastric fistula    Incarcerated hernia    Dyspnea, unspecified type       POD 2 exploratory laparotomy, open ventral hernia repair with biologic mesh, appendectomy, partial omentectomy, mesh explantation     Plan:  Patient is doing well postoperatively.  She has had return of bowel function.  May advance to full liquid diet, then soft diet as tolerated  Prevena VAC to stay in place for 5 days postop  ALEJANDRINA teaching  Rosales removed and patient voiding without difficulty  Continue analgesics and antiemetics as needed  Abdominal binder for comfort  Ambulate and up to chair  DVT prophylaxis with Lovenox  GI prophylaxis with Protonix  Possible discharge tomorrow if cleared by medical team      Vy Tristan PA-C  9/23/2024  9:28 AM     This note was initiated by Parul Alvarenga.  The PA saw the patient in conjunction with me. The PA performed a history, exam, and developed the assessment and plan in conjunction with me. I agree with the above note and have made changes which reflect my own history and physical, if necessary.    Patient continues to do well  Is tolerating clear liquids without any nausea or vomiting  Pain is controlled  Mobilizing without issue  Voiding spontaneously  Passing flatus, no bowel movements yet  Okay to advance diet to full liquids and advance as tolerated to soft    Selin Conley MD  [facility]  9/23/2024  1:41 PM

## 2024-09-23 NOTE — PLAN OF CARE
Assumed care at 1930. A&O X4, 2L NC, VSS, NSR on tele. Cardiac electrolyte replacement protocol. Lab draw. PIV in L AC flushed and capped. Dressing CDI. Lovenox for VTE, SCDs on. CLD, waiting for BM to advance. Burping and passing gas. Continent, up SBA. No BM. PRN oxy for pain- see MAR. Wound vac intact, no drainage. Dressing CDI. ALEJANDRINA drain draining small amount of serosanguineous drainage- see flowsheets. Dressing CDI.     2300: report given to JIA Jaramillo.     Problem: GASTROINTESTINAL - ADULT  Goal: Maintains or returns to baseline bowel function  Description: INTERVENTIONS:  - Assess bowel function  - Maintain adequate hydration with IV or PO as ordered and tolerated  - Evaluate effectiveness of GI medications  - Encourage mobilization and activity  - Obtain nutritional consult as needed  - Establish a toileting routine/schedule  - Consider collaborating with pharmacy to review patient's medication profile  Outcome: Progressing  Goal: Maintains adequate nutritional intake (undernourished)  Description: INTERVENTIONS:  - Monitor percentage of each meal consumed  - Identify factors contributing to decreased intake, treat as appropriate  - Assist with meals as needed  - Monitor I&O, WT and lab values  - Obtain nutritional consult as needed  - Optimize oral hygiene and moisture  - Encourage food from home; allow for food preferences  - Enhance eating environment  Outcome: Progressing     Problem: SKIN/TISSUE INTEGRITY - ADULT  Goal: Skin integrity remains intact  Description: INTERVENTIONS  - Assess and document risk factors for pressure ulcer development  - Assess and document skin integrity  - Monitor for areas of redness and/or skin breakdown  - Initiate interventions, skin care algorithm/standards of care as needed  Outcome: Progressing  Goal: Incision(s), wounds(s) or drain site(s) healing without S/S of infection  Description: INTERVENTIONS:  - Assess and document risk factors for pressure ulcer development  -  Assess and document skin integrity  - Assess and document dressing/incision, wound bed, drain sites and surrounding tissue  - Implement wound care per orders  - Initiate isolation precautions as appropriate  - Initiate Pressure Ulcer prevention bundle as indicated  Outcome: Progressing

## 2024-09-23 NOTE — PROGRESS NOTES
09/23/24 1436   Mobility   O2 walk? Yes   SPO2% on Room Air at Rest 94   SPO2% on Oxygen at Rest 94   At rest oxygen flow (liters per minute) 0   SPO2% Ambulation on Room Air 84   SPO2% Ambulation on Oxygen 96   Ambulation oxygen flow (liters per minute) 0.5     Oxygen walk per order. MD notified.

## 2024-09-23 NOTE — PLAN OF CARE
Assumed care at 0730.  A&O 4.  Oxygen- 2 L via NC. Attempting to wean back to baseline (room air) based on oxygen saturations this shift.  IS at bedside and use encouraged.  Diet advanced to full liquid per MD.   Tele- NSR.  VTE- Lovenox.   Wound vac to abd with no output noted. ALEJANDRINA drain with minimal sanguinous drainage.   PRN pain and nausea meds utilized. See MAR for more details.   Cardiac electrolyte replacement protocol. Magnesium (1.7) replaced. See MAR and managed orders for more details.   Abd binder utilized as needed when up and out of bed for comfort.  SBA.    Pt oriented to the room, safety prec initiated, bed is in the lowest position and call light within reach. Pt updated on the plan of care. All needs met at this time.    1358: Pulse ox checked while ambulating. See notes for more details.   1440: One time dose of IV Lasix given per MAR.  1607: MD paged after this RN received a call from tele reporting that the patient has been switching in and out between SR and Junctional escape intermittently throughout the shift, as well as having 2 runs of V tach at 1438 and 1514. Per MD, no tele needed and order can be discontinued.   1728: Potassium (3.6) replaced.     Problem: GASTROINTESTINAL - ADULT  Goal: Maintains or returns to baseline bowel function  Description: INTERVENTIONS:  - Assess bowel function  - Maintain adequate hydration with IV or PO as ordered and tolerated  - Evaluate effectiveness of GI medications  - Encourage mobilization and activity  - Obtain nutritional consult as needed  - Establish a toileting routine/schedule  - Consider collaborating with pharmacy to review patient's medication profile  Outcome: Progressing  Goal: Maintains adequate nutritional intake (undernourished)  Description: INTERVENTIONS:  - Monitor percentage of each meal consumed  - Identify factors contributing to decreased intake, treat as appropriate  - Assist with meals as needed  - Monitor I&O, WT and lab  values  - Obtain nutritional consult as needed  - Optimize oral hygiene and moisture  - Encourage food from home; allow for food preferences  - Enhance eating environment  Outcome: Progressing     Problem: SKIN/TISSUE INTEGRITY - ADULT  Goal: Skin integrity remains intact  Description: INTERVENTIONS  - Assess and document risk factors for pressure ulcer development  - Assess and document skin integrity  - Monitor for areas of redness and/or skin breakdown  - Initiate interventions, skin care algorithm/standards of care as needed  Outcome: Progressing  Goal: Incision(s), wounds(s) or drain site(s) healing without S/S of infection  Description: INTERVENTIONS:  - Assess and document risk factors for pressure ulcer development  - Assess and document skin integrity  - Assess and document dressing/incision, wound bed, drain sites and surrounding tissue  - Implement wound care per orders  - Initiate isolation precautions as appropriate  - Initiate Pressure Ulcer prevention bundle as indicated  Outcome: Progressing     Problem: GASTROINTESTINAL - ADULT  Goal: Maintains or returns to baseline bowel function  Description: INTERVENTIONS:  - Assess bowel function  - Maintain adequate hydration with IV or PO as ordered and tolerated  - Evaluate effectiveness of GI medications  - Encourage mobilization and activity  - Obtain nutritional consult as needed  - Establish a toileting routine/schedule  - Consider collaborating with pharmacy to review patient's medication profile  Outcome: Progressing  Goal: Maintains adequate nutritional intake (undernourished)  Description: INTERVENTIONS:  - Monitor percentage of each meal consumed  - Identify factors contributing to decreased intake, treat as appropriate  - Assist with meals as needed  - Monitor I&O, WT and lab values  - Obtain nutritional consult as needed  - Optimize oral hygiene and moisture  - Encourage food from home; allow for food preferences  - Enhance eating  environment  Outcome: Progressing     Problem: SKIN/TISSUE INTEGRITY - ADULT  Goal: Skin integrity remains intact  Description: INTERVENTIONS  - Assess and document risk factors for pressure ulcer development  - Assess and document skin integrity  - Monitor for areas of redness and/or skin breakdown  - Initiate interventions, skin care algorithm/standards of care as needed  Outcome: Progressing  Goal: Incision(s), wounds(s) or drain site(s) healing without S/S of infection  Description: INTERVENTIONS:  - Assess and document risk factors for pressure ulcer development  - Assess and document skin integrity  - Assess and document dressing/incision, wound bed, drain sites and surrounding tissue  - Implement wound care per orders  - Initiate isolation precautions as appropriate  - Initiate Pressure Ulcer prevention bundle as indicated  Outcome: Progressing     Problem: DISCHARGE PLANNING  Goal: Discharge to home or other facility with appropriate resources  Description: INTERVENTIONS:  - Identify barriers to discharge w/pt and caregiver  - Include patient/family/discharge partner in discharge planning  - Arrange for needed discharge resources and transportation as appropriate  - Identify discharge learning needs (meds, wound care, etc)  - Arrange for interpreters to assist at discharge as needed  - Consider post-discharge preferences of patient/family/discharge partner  - Complete POLST form as appropriate  - Assess patient's ability to be responsible for managing their own health  - Refer to Case Management Department for coordinating discharge planning if the patient needs post-hospital services based on physician/LIP order or complex needs related to functional status, cognitive ability or social support system  Outcome: Progressing

## 2024-09-24 ENCOUNTER — MOBILE ENCOUNTER (OUTPATIENT)
Dept: INTERNAL MEDICINE UNIT | Facility: HOSPITAL | Age: 74
End: 2024-09-24

## 2024-09-24 VITALS
BODY MASS INDEX: 32.39 KG/M2 | SYSTOLIC BLOOD PRESSURE: 93 MMHG | DIASTOLIC BLOOD PRESSURE: 49 MMHG | TEMPERATURE: 98 F | OXYGEN SATURATION: 91 % | HEIGHT: 60 IN | RESPIRATION RATE: 16 BRPM | WEIGHT: 165 LBS | HEART RATE: 73 BPM

## 2024-09-24 LAB
ANION GAP SERPL CALC-SCNC: 5 MMOL/L (ref 0–18)
BUN BLD-MCNC: 12 MG/DL (ref 9–23)
CALCIUM BLD-MCNC: 9.7 MG/DL (ref 8.7–10.4)
CHLORIDE SERPL-SCNC: 103 MMOL/L (ref 98–112)
CO2 SERPL-SCNC: 30 MMOL/L (ref 21–32)
CREAT BLD-MCNC: 0.49 MG/DL
EGFRCR SERPLBLD CKD-EPI 2021: 98 ML/MIN/1.73M2 (ref 60–?)
GLUCOSE BLD-MCNC: 91 MG/DL (ref 70–99)
MAGNESIUM SERPL-MCNC: 1.6 MG/DL (ref 1.6–2.6)
OSMOLALITY SERPL CALC.SUM OF ELEC: 285 MOSM/KG (ref 275–295)
POTASSIUM SERPL-SCNC: 4.2 MMOL/L (ref 3.5–5.1)
POTASSIUM SERPL-SCNC: 4.4 MMOL/L (ref 3.5–5.1)
SODIUM SERPL-SCNC: 138 MMOL/L (ref 136–145)

## 2024-09-24 PROCEDURE — 99239 HOSP IP/OBS DSCHRG MGMT >30: CPT | Performed by: INTERNAL MEDICINE

## 2024-09-24 RX ORDER — MAGNESIUM OXIDE 400 MG/1
400 TABLET ORAL ONCE
Status: COMPLETED | OUTPATIENT
Start: 2024-09-24 | End: 2024-09-24

## 2024-09-24 NOTE — PAYOR COMM NOTE
9/21 THRU 9/23  ADMISSION REVIEW     Payor: [employer] MEDICARE ADV PPO  Subscriber #:  [id number]  Authorization Number: [id number]    Admit date: 9/21/24  Admit time:  6:03 AM       REVIEW DOCUMENTATION:     ED Provider Notes        ED Provider Notes signed by Gianfranco Weiss MD at 9/21/2024  5:21 AM       Author: Gianfranco Weiss MD Service: -- Author Type: Physician    Filed: 9/21/2024  5:21 AM Date of Service: 9/20/2024 10:14 PM Status: Signed    : Gianfranco Weiss MD (Physician)           Patient Seen in: [facility]      History     Chief Complaint   Patient presents with    Nausea/Vomiting/Diarrhea    Difficulty Breathing     Stated Complaint: inpatient 24 hours ago, gastric ulcer with perforation, n/v bile x 24 hours. sob    Subjective:   HPI    Patient is a 74-year-old female presents to ED for evaluation of abdominal pain, shortness of breath.  Patient admitted to hospital on 9/14 for potential perforated gastric ulcer.  Patient had upper GI that did not show any evidence for perforated ulcer.  Patient was discharged home yesterday.  Patient complained of some shortness of breath yesterday.  She had some abdominal pain starting this morning.  She has known hernias with hernia repair in the past.  She states her hernia normally is reducible but now it is not.  She had at least 8 episodes of vomiting.  Patient denies cough.  She denies chest pain.  No fever.    Objective:   Past Medical History:    Atherosclerosis of coronary artery    Description: mild, medical therapy.     Atrophic vaginitis    Bundle branch block    Calculus of gallbladder without cholecystitis without obstruction    Chronic venous insufficiency    Cyst of ovary    Diverticulosis of colon    Left side    Eczema    Gastroesophageal reflux disease with esophagitis    Hx of colonic polyps    Hyperlipidemia, mixed    Hypertension, essential    Mild intermittent asthma without complication (HCC)    Primary  generalized (osteo)arthritis    Ventral hernia without obstruction or gangrene    Repaired 2020              Past Surgical History:   Procedure Laterality Date      1980    Colonoscopy  2015    Diverticulosis    Egd N/A 2021    Procedure: ESOPHAGOGASTRODUODENOSCOPY, COLONOSCOPY, POSSIBLE BIOPSY, POSSIBLE POLYPECTOMY 81233, 38842;  Surgeon: Sampson Damon MD;  Location: [facility]    Inguinal hernia repair  1968    Lithotripsy      Ventral hernia repair  2020    1.7 inch Ventralex ST hernia patch                Social History     Socioeconomic History    Marital status:    Tobacco Use    Smoking status: Former    Smokeless tobacco: Never   Vaping Use    Vaping status: Never Used   Substance and Sexual Activity    Alcohol use: Yes     Alcohol/week: 0.0 standard drinks of alcohol     Comment: ocassionally    Drug use: No     Social Determinants of Health     Food Insecurity: No Food Insecurity (2024)    Food Insecurity     Food Insecurity: Never true   Transportation Needs: No Transportation Needs (2024)    Transportation Needs     Lack of Transportation: No   Housing Stability: Low Risk  (2024)    Housing Stability     Housing Instability: No              Review of Systems    Positive for stated Chief Complaint: Nausea/Vomiting/Diarrhea and Difficulty Breathing    Other systems are as noted in HPI.  Constitutional and vital signs reviewed.      All other systems reviewed and negative except as noted above.    Physical Exam     ED Triage Vitals   BP 24 (!) 167/87   Pulse 24 86   Resp 24 20   Temp 24 98.4 °F (36.9 °C)   Temp src 24 0431 Temporal   SpO2 24 91 %   O2 Device 24 None (Room air)       Current Vitals:   Vital Signs  BP: 139/65  Pulse: 84  Resp: 24  Temp: 98.1 °F (36.7 °C)  Temp src: Temporal  MAP (mmHg): 85    Oxygen Therapy  SpO2: 95 %  O2 Device: Nasal cannula  O2 Flow  Rate (L/min): 4 L/min  Pulse Oximetry Type: Continuous  Oximetry Probe Site Changed: No            Physical Exam    GENERAL: No acute distress, well appearing and non-toxic, Alert and oriented X 3   HEENT: Normocephalic, atraumatic.  Moist mucous membranes.  Pupils equal round reactive to light and accommodation, extraocular motion is intact, sclerae white, conjunctiva is pink.  Oropharynx is unremarkable, no exudate.  NECK: Supple, trachea midline, no lymphadenopathy.   LUNG: Slight crackles bilateral bases  CARDIOVASCULAR: Regular rate and rhythm.  Normal S1S2.  No S3S4 or murmur.  ABDOMEN: Bowel sounds are present.  Patient has a nonreducible hernia with slight redness to the skin of the lower abdomen.  Hernia is tender to palpation.  Upper abdomen nontender  MUSCULOSKELETAL: No calf tenderness.  Dorsalis and Posterior Tibial pulses present. No clubbing. No cyanosis.  No edema.   SKIN EXAMINATIoN: Warm and dry with normal appearance.  No rashes or lesions.  NEUROLOGICAL:  Motor strength intact all groups.  normal sensation, speech intact    ED Course     Labs Reviewed   CBC WITH DIFFERENTIAL WITH PLATELET - Abnormal; Notable for the following components:       Result Value    Neutrophil Absolute Prelim 7.71 (*)     Neutrophil Absolute 7.71 (*)     All other components within normal limits   COMP METABOLIC PANEL (14) - Abnormal; Notable for the following components:    Glucose 108 (*)     BUN 7 (*)     ALT 64 (*)     Bilirubin, Total 1.2 (*)     All other components within normal limits   TROPONIN I HIGH SENSITIVITY - Abnormal; Notable for the following components:    Troponin I (High Sensitivity) 36 (*)     All other components within normal limits   PRO BETA NATRIURETIC PEPTIDE - Abnormal; Notable for the following components:    Pro-Beta Natriuretic Peptide 9,064 (*)     All other components within normal limits   LIPID PANEL - Abnormal; Notable for the following components:    HDL Cholesterol 34 (*)     All  other components within normal limits   PROTHROMBIN TIME (PT) - Normal   PTT, ACTIVATED - Normal   SARS-COV-2/FLU A AND B/RSV BY PCR (GENEXPERT) - Normal    Narrative:     This test is intended for the qualitative detection and differentiation of SARS-CoV-2, influenza A, influenza B, and respiratory syncytial virus (RSV) viral RNA in nasopharyngeal or nares swabs from individuals suspected of respiratory viral infection consistent with COVID-19 by their healthcare provider. Signs and symptoms of respiratory viral infection due to SARS-CoV-2, influenza, and RSV can be similar.    Test performed using the Xpert Xpress SARS-CoV-2/FLU/RSV (real time RT-PCR)  assay on the [employer] instrument, [employer], [place] [address].   This test is being used under the Food and Drug Administration's Emergency Use Authorization.    The authorized Fact Sheet for Healthcare Providers for this assay is available upon request from the laboratory.   TYPE AND SCREEN    Narrative:     The following orders were created for panel order Type and screen.  Procedure                               Abnormality         Status                     ---------                               -----------         ------                     ABORH (Blood Type)[[id number]]                               Final result               Antibody Screen[[id number]]                                  Final result                 Please view results for these tests on the individual orders.   ABORH (BLOOD TYPE)   ANTIBODY SCREEN   ABORH CONFIRMATION   SURGICAL PATHOLOGY TISSUE   RAINBOW DRAW LAVENDER   RAINBOW DRAW LIGHT GREEN   RAINBOW DRAW BLUE   RAINBOW DRAW GOLD   Troponin 36.  proBNP 9064.    EKG  Rate, Axis and intervals as noted.  I agree with computer interpretation.  Rate: 80  Rhythm: Sinus rhythm  Reading: Right bundle branch block.  Patient has slight ST depression V2 through V6.  EKG unchanged when compared to prior EKG 2021  I personally reviewd CT images of  chest, abdomen and independent interpretation shows ventral wall hernia with pleural effusions.  I also viewed formal radiology report as read by radiology with findings below:  CTA CHEST + CT ABD (W) + CT PEL (W) (CPT=71275/34298)    Result Date: 9/21/2024  PROCEDURE:  CTA CHEST + CT ABD (W) + CT PEL (W) SH(CPT=71275/65622)  COMPARISON:  [facility] , CT, CT ABDOMEN+PELVIS(CPT=74176), 9/18/2024, 8:45 PM.  INDICATIONS:  Acute chest and abdominal pain with shortness of breath.  TECHNIQUE:  CT of the chest (with CTA imaging), abdomen, and pelvis were obtained post- injection of non-ionic intravenous contrast material. Multi-planar reformatted/3-D images were created to optimize visualization of vascular anatomy.  Dose reduction techniques were used. Dose information is transmitted to the ACR (American College of Radiology) NRDR (National Radiology Data Registry) which includes the Dose Index Registry.  PATIENT STATED HISTORY:(As transcribed by Technologist)  Abdumen pain and shortness of breath starting today   CONTRAST USED:  100cc of Isovue 370  FINDINGS:   CHEST:  LUNGS/PLEURA:  There are moderate bilateral pleural effusions, increased in size from prior imaging.  Secondary passive atelectatic changes of the dependent aspects of both lungs.  There is also a mosaic perfusion pattern throughout the visualized lungs and pulmonary vascular congestion, suspicious for a mild degree of diffuse pulmonary edema. MEDIASTINUM:  Mild nonspecific mediastinal lymphadenopathy.  There is a reference lymph node within the pretracheal region measuring 1.4 cm in short axis dimension.  There is an additional reference lymph node within the AP window measuring 1.6 cm. CHERISE:  No mass or adenopathy.  CARDIAC:  Borderline cardiomegaly is noted.  Mild atherosclerotic calcifications of the LAD.  There is a minimal pericardial effusion measuring 6 mm. CHEST WALL:  No mass or axillary adenopathy.  AORTA:  No aneurysm or dissection.  VASCULATURE:   No visible pulmonary arterial thrombus or attenuation.   ABDOMEN/PELVIS: LIVER:  Borderline hepatomegaly measuring 21.7 cm in craniocaudal dimension.  Mild fatty infiltration of the liver suggested.  There is a small nonenhancing simple cyst along the dome of the right lobe of the liver anteriorly measuring 1 cm.  This can be  retrospectively seen on the previous study.  This is consistent with a simple cyst and no further workup is required or recommended. BILIARY:  There is a large laminated gallstone within the gallbladder lumen.  No evidence of acute cholecystitis or biliary ductal dilatation. PANCREAS:  No lesion, fluid collection, ductal dilatation, or atrophy.  SPLEEN:  No enlargement or focal lesion.  KIDNEYS:  There is a punctate nonobstructing stone within a left inferior pole calyx measuring 3 mm.  No hydronephrosis or hydroureter.  No additional renal lesions. ADRENALS:  No mass or enlargement.  AORTA:  No aneurysmal dilatation.  Moderate atherosclerotic changes of the aorta and iliac vessels. RETROPERITONEUM:  No mass or adenopathy.  BOWEL/MESENTERY:  The stomach and duodenal sweep are unremarkable.  There are multiple dilated loops of small bowel within the left upper to mid abdomen measuring up to 3.4 cm in maximal diameter, secondary to a small bowel containing periumbilical hernia that is slightly increased in size with evidence of secondary small bowel obstruction.  The colon is decompressed with uncomplicated diverticular changes of the descending and sigmoid regions. ABDOMINAL WALL:  As noted above there is a periumbilical hernia containing small bowel which has slightly increased in size from prior imaging currently measuring 7.4 x 11.5 cm, previously measuring approximately 5.9 x 10.3 cm.  Secondary small bowel obstruction.  The small bowel distal to the hernia sac is decompressed.  There is a left-sided indirect inguinal hernia measuring 3.5 x 2.6 cm. URINARY BLADDER:  No visible focal wall  thickening, lesion, or calculus.  PELVIC NODES:  No adenopathy.  PELVIC ORGANS:  No visible mass.  Pelvic organs appropriate for patient age.  BONES:  There is an exaggerated thoracic kyphosis.  There is moderate to extensive multilevel disc disease of the thoracolumbar spine with multilevel vacuum disc changes.            CONCLUSION:  1. There is a periumbilical hernia containing mesenteric fat and a loop of small bowel.  There is a secondary small bowel obstruction with the small bowel loops proximal to the hernia sac measuring up to 3.4 cm.  The small bowel distal to the hernia sac is decompressed. 2. Uncomplicated diverticulosis of the descending and sigmoid colon. 3. Within the chest, there are moderate bilateral pleural effusions which have increased in size from prior imaging.  There is also secondary passive atelectasis along the dependent lung bases as well as suggestion of pulmonary vascular congestion with mild diffuse pulmonary edema. 4. There is cholelithiasis without discrete CT evidence of acute cholecystitis or biliary ductal dilatation. 5. Please see the body of the report above for further, less significant details.    LOCATION:  [facility]   Dictated by (CST): Shahida Worthy DO on 9/21/2024 at 0:06 AM     Finalized by (CST): Shahida Worthy DO on 9/21/2024 at 0:20 AM         Medications   morphINE PF 4 MG/ML injection 4 mg ( Intravenous MAR Hold 9/21/24 0122)   lactated ringers infusion ( Intravenous Rate/Dose Change 9/21/24 0430)   lactated ringers IV bolus 500 mL (has no administration in time range)   atropine 0.1 MG/ML injection 0.5 mg (has no administration in time range)   naloxone (Narcan) 0.4 MG/ML injection 0.08 mg (has no administration in time range)   midazolam (Versed) 2 MG/2ML injection 1 mg (has no administration in time range)   meperidine (Demerol) 25 MG/ML injection 12.5 mg (has no administration in time range)   fentaNYL (Sublimaze) 50 mcg/mL injection 25 mcg (has no administration in  time range)     Or   fentaNYL (Sublimaze) 50 mcg/mL injection 50 mcg (has no administration in time range)   HYDROmorphone (Dilaudid) 1 MG/ML injection 0.2 mg (has no administration in time range)     Or   HYDROmorphone (Dilaudid) 1 MG/ML injection 0.4 mg (has no administration in time range)     Or   HYDROmorphone (Dilaudid) 1 MG/ML injection 0.6 mg (has no administration in time range)   ondansetron (Zofran) 4 MG/2ML injection 4 mg (has no administration in time range)   metoclopramide (Reglan) 5 mg/mL injection 10 mg (has no administration in time range)   albuterol (Ventolin) (2.5 MG/3ML) 0.083% nebulizer solution 2.5 mg (has no administration in time range)   ondansetron (Zofran) 4 MG/2ML injection 4 mg (4 mg Intravenous Given 9/20/24 2034)   ondansetron (Zofran) 4 MG/2ML injection 4 mg (4 mg Intravenous Given 9/20/24 2240)   iopamidol 76% (ISOVUE-370) injection for power injector (100 mL Intravenous Given 9/20/24 2326)   furosemide (Lasix) 10 mg/mL injection 40 mg (40 mg Intravenous Given 9/21/24 0106)   lidocaine (Urojet) 2 % urethral jelly 10 mL (10 mL Urethral Given 9/21/24 0106)   furosemide (Lasix) 10 mg/mL injection 10 mg (10 mg Intravenous Given 9/21/24 0438)   furosemide (Lasix) 10 mg/mL injection (has no administration in time range)     I personally reviewed xray films of chest and independent interpretation shows adequate placement of NG tube.  I also reviewed formal xray report as read by radiology with findings below:  Xray of chest read by [facility]ology shows NG tube placed with tip in the stomach      MDM      Patient is a 74-year-old female presents to ED for evaluation of abdominal pain, shortness of breath.      #1 shortness of breath: Differential CHF, pneumonia, PE, ACS.  Patient had laboratory test performed showing slightly elevated troponin of 36.  proBNP elevated at 9064.  EKG unchanged from prior.  CT chest shows no evidence for pulmonary embolism.  Patient does have fluid  overload and pleural effusions.  She was given IV Lasix.  She was placed on nasal cannula as oxygen levels 91%.  Case was discussed with hospitalist    #2 abdominal pain: Differential incarcerated or strangulated hernia, bowel obstruction.  Patient CT scan shows incarcerated hernia with secondary small bowel obstruction.  Based on clinical exam was overlying skin changes, concern for strangulation.  Case discussed with general surgery, Dr. Conley who recommended surgical intervention tonight for incarcerated possibly strangulated hernia..  Critical care time was 35 minutes. This critical care time is exclusive of procedures critical care time includes monitoring of patient's cardiopulmonary and hemodynamic status, interpretation of laboratory values, and discussion of case with physician and consultants.  NG tube inserted.  Patient went directly to operating room        External and old record review was performed.  I reviewed discharge summary from recent hospitalization.  Reviewed initial CT scan on 9/11 showing potential free air in the right upper quadrant concerning for perforated gastric ulcer.  Reviewed upper GI on 9/16 showing no evidence for perforation                         Medical Decision Making      Disposition and Plan     Clinical Impression:  1. Incarcerated hernia    2. Dyspnea, unspecified type    3. Ventral hernia    4.  CHF  5.  Pleural effusions    Disposition:  Send to or/cath/gi  9/21/2024  1:08 am    Follow-up:  No follow-up provider specified.        Medications Prescribed:  Current Discharge Medication List                                           Signed by Gianfranco Weiss MD on 9/21/2024  5:21 AM         MEDICATIONS ADMINISTERED IN LAST 1 DAY:  acetaminophen (Tylenol Extra Strength) tab 1,000 mg       Date Action Dose Route User    9/23/2024 2123 Given 1,000 mg Oral Marcy Vazquez RN    9/23/2024 1456 Given 1,000 mg Oral Fela Low RN          enoxaparin (Lovenox) 40  MG/0.4ML SUBQ injection 40 mg       Date Action Dose Route User    9/23/2024 2040 Given 40 mg Subcutaneous (Right Lower Abdomen) Marcy Vazquez RN          fenofibrate micronized (Lofibra) cap 134 mg       Date Action Dose Route User    9/24/2024 0815 Given 134 mg Oral Fela Low RN          fluticasone-salmeterol (ADVAIR HFA) 230-21 MCG/ACT AERO 2 puff [Patient own med]       Date Action Dose Route User    9/24/2024 0815 Given 2 puff Inhalation Fela Low RN    9/23/2024 2041 Given 2 puff Inhalation Marcy Vazquez RN          furosemide (Lasix) 10 mg/mL injection 40 mg       Date Action Dose Route User    9/23/2024 1456 Given 40 mg Intravenous Fela Low RN          lisinopril (Zestril) tab 10 mg       Date Action Dose Route User    9/24/2024 0815 Given 10 mg Oral Fela Low RN          magnesium oxide (Mag-Ox) tab 400 mg       Date Action Dose Route User    9/24/2024 1006 Given 400 mg Oral Fela Low RN          ondansetron (Zofran) 4 MG/2ML injection 4 mg       Date Action Dose Route User    9/23/2024 1657 Given 4 mg Intravenous Fela Low RN          pantoprazole (Protonix) DR tab 40 mg       Date Action Dose Route User    9/24/2024 0557 Given 40 mg Oral Marcy Vazquez RN          potassium chloride (Klor-Con M20) tab 40 mEq       Date Action Dose Route User    9/23/2024 2040 Given 40 mEq Oral Marcy Vazquez RN    9/23/2024 1830 Given 40 mEq Oral Fela Low RN          rosuvastatin (Crestor) tab 5 mg       Date Action Dose Route User    9/23/2024 2040 Given 5 mg Oral Marcy Vazquez RN            Vitals (last day)       Date/Time Temp Pulse Resp BP SpO2 Weight O2 Device O2 Flow Rate (L/min) Who    09/24/24 0700 -- -- -- -- 92 % -- Nasal cannula 1 L/min     09/24/24 0438 98.4 °F (36.9 °C) -- 16 -- -- -- Nasal cannula 1 L/min LL    09/23/24 2351 98.4 °F (36.9 °C) 70 16 110/61 -- -- Nasal cannula 1 L/min     09/23/24 1927 97.8 °F (36.6 °C) -- 18 107/51 -- --  Nasal cannula 1 L/min LL    09/23/24 1126 97.7 °F (36.5 °C) 68 -- 108/56 95 % -- Nasal cannula 2 L/min SBA    09/23/24 0745 98.6 °F (37 °C) 75 18 108/56 93 % -- Nasal cannula 2 L/min SBA    09/23/24 0406 98 °F (36.7 °C) 75 20 116/62 92 % -- Nasal cannula 3 L/min JM     9/21 H&P    Chief Complaint: Abdominal pain        Subjective:  History of Present Illness:      Yudy Lama is a 74 year old female with essential hypertension, dyslipidemia, GERD and asthma who presents with abdominal pain. Patient recently admitted for abdominal pain d/t suspected perforated gastric ulcer (ruled out) and acute vs chronic cholecystitis. 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 Patient treated with abx and bowel rest. Diet initiated and tolerated. She was discharged home. Patient returns with abdominal pain. She was found to have incarcerated ventral hernia with SBO for which she underwent exlap, open ventral hernia repair, appendectomy, partial omentectomy 9/21. Patient seen post-op. She states she is feeling better. Has abdominal pain at the margins. No nausea or vomiting. She was short of breath at home and on arrival in the ER, given Lasix to which she responded positively. Patient remains on oxygen, but is breathing better.           Assessment & Plan:  #Abdominal pain d/t incarcerated ventral hernia with SBO sp exlap, open ventral hernia repair, appendectomy, partial omentectomy 9/21  -CLD  -Hold IVF  -Pain control  -Wound care  -Surgical service following     #Elevated bilirubin  #Cholecysto-gastric fistula  #Gallstone, large, within lumen   -Monitor LFTs     #GERD  -PPI     #Acute hypoxic respiratory failure d/t acute pulmonary edema, pleural effusion, bilateral, moderate and atelectasis   -Hold further Lasix right now, re-assess today  -Oxygen, wean as able  -Incentive spirometry  -Increase activity      #Mediastinal lymphadenopathy, etiology?   -Outpatient follow-up     #Pericardial effusion, minimal    -Consider ECHO     #Essential hypertension  #Dyslipidemia  #Asthma     DVT Px: Per surgery       GEN SURGERY CONSULT NOTE    Reason for Surgical Consultation: Incarcerated ventral hernia with bowel obstruction     History of Present Illness:  Yudy Lama is a a(n) 74 year old female who presented to hospital with 1 day history of abdominal pain, nausea, and vomiting.  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  During hospitalization, patient was able to tolerate diet and have bowel function.  She was discharged yesterday.  Patient states since discharge, she has only passed liquid stool.  This morning, she was awakened by pain in her abdomen.  She subsequently developed nausea and vomiting.  During this time, she also had decreased saturations and was coughing quite a bit.  She presented to the emergency room for further evaluation management.     In the emergency room, patient was hemodynamically stable but had an oxygen saturation of 85%.  Serology was done which was all normal.  CT abdomen pelvis was performed which showed worsening of her ventral hernia with a loop of small bowel with proximal bowel dilation and decompressed distal bowel concerning for incarcerated ventral hernia with bowel obstruction.  General surgery was called for further evaluation and management.     On interview, patient reports continued abdominal pain.  NG tube was placed without any relief.  Her abdominal surgeries is significant for  and an open umbilical hernia repair with mesh in 2020.          Impression/Plan:     74 year old female with incarcerated ventral hernia with small bowel obstruction     CT images were reviewed personally by me  CT showed an infraumbilical ventral hernia with a loop of small bowel  Small intestines was dilated proximally to it and decompressed distally to the  hernia concerning for incarcerated ventral hernia  On physical exam, patient has a hard, tender, and nonreducible infraumbilical hernia with overlying skin color changes including erythema  I recommend proceeding emergently to the operating room for reduction and repair with mesh  I discussed the procedure with the patient, including the use of permanent mesh, using biologic mesh if a bowel resection is done or if there is any compromise  I also discussed with the patient that in light of her newly discovered cholegastric fistula, this will not be evaluated or taken down at the time of surgery, goal of surgery is to alleviate the small bowel obstruction  Risks include bleeding, pain, infection, recurrence, injury to anything in the abdomen, anastomotic leak if we have to do a bowel resection, and need for further intervention  Patient acknowledged understanding and wishes to proceed     Patient will be admitted postoperatively  Surgery will continue to follow  Rest of care per primary     9/21 GEN SURGERY PROGRESS NOTE    Subjective:  The patient was seen and examined at bedside. She states she feels very distended today. She denies nausea or vomiting. She is tolerating clear liquids. She denies passing flatus or having a bowel movement.      Objective/Physical Exam:  /57 (BP Location: Right arm)   Pulse 74   Temp 97.7 °F (36.5 °C) (Oral)   Resp 20   Ht 60\"   Wt 164 lb 15.9 oz (74.8 kg)   LMP 01/01/1995   SpO2 93%   BMI 32.22 kg/m²      Intake/Output Summary (Last 24 hours) at 9/21/2024 1451  Last data filed at 9/21/2024 1400      Gross per 24 hour   Intake 1340 ml   Output 2150 ml   Net -810 ml       POD 0 exploratory laparotomy, open ventral hernia repair with biologic mesh, appendectomy, partial omentectomy, mesh explantation     Plan:  Continue clear liquid diet until further return of bowel function  Antiemetics and analgesics as needed  Continue [place] catheter until improved urine output    Encourage ambulation and up to chair  Encourage incentive spirometer   Medical management per primary  DVT prophylaxis with lovenox  GI prophylaxis with protonix     9/22 HOSPITALIST NOTE    Subjective:  Patient felt a little bloated yesterday but better today  Breathing much better            Objective:  Review of Systems:   A comprehensive review of systems was completed; pertinent positive and negatives stated in subjective.     Vital signs:  Temp:  [97.7 °F (36.5 °C)-98.9 °F (37.2 °C)] 98.6 °F (37 °C)  Pulse:  [66-79] 71  Resp:  [18-24] 21  BP: (107-130)/(57-70) 125/69  SpO2:  [91 %-93 %] 93 %     Physical Exam:    General: No acute distress  Respiratory: basilar fine crackles   Cardiovascular: S1, S2, regular rate and rhythm  Abdomen: midline incision bandaged,  wound vac, ALEJANDRINA drain with SS output   Neuro: No new focal deficits.   Extremities: No edema        Diagnostic Data:    Labs:          Recent Labs   Lab 09/16/24  0630 09/18/24  0536 09/20/24 2042 09/21/24  1010 09/22/24  0630   WBC 7.2 5.9 9.7 12.7* 10.0   HGB 11.0* 10.9* 13.4 12.4 11.6*   MCV 93.2 93.5 91.9 92.9 94.3   .0 221.0 311.0 274.0 248.0   INR  --   --  1.04  --   --                  Recent Labs   Lab 09/18/24  0536 09/18/24  1517 09/19/24  0505 09/20/24 2042 09/21/24  1010   *  --   --  108* 171*   BUN <5*  --   --  7* 7*   CREATSERUM 0.45*  --   --  0.55 0.62   CA 8.8  --   --  10.3 9.5   ALB  --   --   --  4.2 3.5     --   --  143 142   K 2.9*   < > 4.2 3.8 3.8     --   --  106 106   CO2 29.0  --   --  27.0 26.0   ALKPHO  --   --   --  113 107   AST  --   --   --  30 27   ALT  --   --   --  64* 50*   BILT  --   --   --  1.2* 0.8   TP  --   --   --  7.6 7.0          Assessment & Plan:  #Incarcerated ventral hernia with small bowel obstruction s/p Exploratory Laparotomy, hernia repair, appendectomy  -Date of surgery 9/21/2024  -Diet per surgery  -remove monten[place]ro when okay with surgery   -Pain control      #Pulmonary  edema, Acute on chronic HFpEF  -ECHO notable for diastolic dysfunction  -improved with diuresis, additional dose of Lasix today     #Acute hypoxic respiratory failure 2/2 above + Atelectasis   -Diurese as tolerated  -IS and ambulation encouraged   -Wean O2     #Essential hypertension  -PTA: Lisinopril     #Hyperlipidemia  -PTA: Rosuvastatin, fenofibrate     #GERD  -PTA: PPI    #Mild intermittent asthma  -Advair  -prn albuterol     9/22 GEN SURGERY NOTE    Subjective:  She is seen and examined resting in bed with family at bedside. She reports doing well today. She is tolerating clear liquids. She denies passing flatus or a bowel movement, but reports feeling \"rumbling\".      Urine output yesterday 1,850   Objective/Physical Exam:  /70 (BP Location: Right arm)   Pulse 74   Temp 98.3 °F (36.8 °C) (Oral)   Resp 18   Ht 60\"   Wt 164 lb 15.9 oz (74.8 kg)   LMP 01/01/1995   SpO2 93%   BMI 32.22 kg/m²      Intake/Output Summary (Last 24 hours) at 9/22/2024 1158  Last data filed at 9/22/2024 0730      Gross per 24 hour   Intake 420 ml   Output 1750 ml   Net -1330 ml      POD 1 exploratory laparotomy, open ventral hernia repair with biologic mesh, appendectomy, partial omentectomy, mesh explantation      Plan:  Continue clear liquid diet; await return of bowel function prior to diet advancement  Intraoperative findings discussed with the patient   Okay to keep [place] given diuretic, but can discontinue afterwards   Encourage ambulation and up to chair- add abdominal binder for comfort   Encourage incentive spirometer  Continue prevena wound vac for 5 days post op   Medical management per primary  DVT prophylaxis with lovenox  GI prophylaxis with protonix      Ella Sanchez PA-C  9/22/2024  11:58 AM     This note was initiated by Ella Sanchez.  The PA saw the patient in conjunction with me. The PA performed a history, exam, and developed the assessment and plan in conjunction with me. I agree with the above  note and have made changes which reflect my own history and physical, if necessary.     Having gas pain but no flatus or bowel movements  Tolerating clear liquids without any nausea  Pain is controlled  Ambulating  Urine output increased to 1800, receiving diuretics per primary  Remove [place]en[place]  Abdominal binder  Will advance diet once patient having more bowel function    9/23 HOSPITALIST NOTE    Chief Complaint: Abdominal pain        Subjective:  Feels well, about to advance to full liquid diet   Breathing is improved            Objective:  Review of Systems:   A comprehensive review of systems was completed; pertinent positive and negatives stated in subjective.     Vital signs:  Temp:  [98 °F (36.7 °C)-98.6 °F (37 °C)] 98.6 °F (37 °C)  Pulse:  [68-94] 75  Resp:  [18-26] 18  BP: (106-119)/(51-66) 108/56  SpO2:  [92 %-94 %] 93 %     Physical Exam:    General: No acute distress  Respiratory: basilar fine crackles   Cardiovascular: S1, S2, regular rate and rhythm  Abdomen: midline incision bandaged,  wound vac, ALEJANDRINA drain with SS output   Neuro: No new focal deficits.   Extremities: No edema         Assessment & Plan:  #Incarcerated ventral hernia with small bowel obstruction s/p Exploratory Laparotomy, hernia repair, appendectomy  -Date of surgery 9/21/2024  -Diet per surgery - FLD  -[place] removed   -Pain control      #Pulmonary edema, Acute on chronic HFpEF  -ECHO notable for diastolic dysfunction  -improved with diuresis     #Acute hypoxic respiratory failure 2/2 above + Atelectasis   -Diurese as tolerated  -IS and ambulation encouraged   -Wean O2 as able, Ambulatory O2 walk      #Essential hypertension  -PTA: Lisinopril     #Hyperlipidemia  -PTA: Rosuvastatin, fenofibrate     #GERD  -PTA: PPI    #Mild intermittent asthma  -Advair  -prn albuterol      9/23 GEN SURGERY NOTE    Subjective:  Patient is sitting comfortably in chair.  She states she overall is feeling well today.  She reports minimal incisional discomfort.   She is tolerating a clear liquid diet without nausea or vomiting.  She is passing flatus.  Denies any fevers or chills.     Objective/Physical Exam:  General: Alert, orientated x3.  Cooperative.  No apparent distress.  Vital Signs:  Blood pressure 108/56, pulse 75, temperature 98.6 °F (37 °C), temperature source Oral, resp. rate 18, height 60\", weight 164 lb 15.9 oz (74.8 kg), last menstrual period 01/01/1995, SpO2 93%, not currently breastfeeding.  Lungs: No respiratory distress.  Cardiac: Regular rate and rhythm.   Abdomen:  Soft, mildly distended, appropriate incisional tenderness, with no rebound or guarding.  No peritoneal signs.   Extremities:  No lower extremity edema noted.    Incision: Prevena VAC clean, dry, intact  Drain: serosang          POD 2 exploratory laparotomy, open ventral hernia repair with biologic mesh, appendectomy, partial omentectomy, mesh explantation      Plan:  Patient is doing well postoperatively.  She has had return of bowel function.  May advance to full liquid diet, then soft diet as tolerated  Prevena VAC to stay in place for 5 days postop  ALEJANDRINA teaching  Rosales removed and patient voiding without difficulty  Continue analgesics and antiemetics as needed  Abdominal binder for comfort  Ambulate and up to chair  DVT prophylaxis with Lovenox  GI prophylaxis with Protonix  Possible discharge tomorrow if cleared by medical team     This note was initiated by Parul Alvarenga.  The PA saw the patient in conjunction with me. The PA performed a history, exam, and developed the assessment and plan in conjunction with me. I agree with the above note and have made changes which reflect my own history and physical, if necessary.     Patient continues to do well  Is tolerating clear liquids without any nausea or vomiting  Pain is controlled  Mobilizing without issue  Voiding spontaneously  Passing flatus, no bowel movements yet  Okay to advance diet to full liquids and advance as tolerated to  soft

## 2024-09-24 NOTE — DISCHARGE SUMMARY
[facility]     Yudy Lama Patient Status:  Inpatient    1950 MRN [id number]   Location [facility] 3NE-A Attending Antonio Mcmillan MD   Hosp Day # 3 PCP Robert Kraus MD     Date of Admission: 2024  Date of Discharge:   2024    Discharge Disposition: Home or Self Care    Discharge Diagnosis:  #Incarcerated ventral hernia with small bowel obstruction s/p Exploratory Laparotomy, hernia repair, appendectomy  -Date of surgery 2024  -Diet per surgery - FLD -> Soft  -Rosales removed   -Pain control      #Pulmonary edema, Acute on chronic HFpEF  -ECHO notable for diastolic dysfunction  -improved with diuresis, will hold further      #Acute hypoxic respiratory failure 2/2 above + Atelectasis   -Diurese as tolerated  -IS and ambulation encouraged   -Wean O2 as able, Ambulatory O2 walk      #Essential hypertension  -PTA: Lisinopril     #Hyperlipidemia  -PTA: Rosuvastatin, fenofibrate     #GERD  -PTA: PPI    #Mild intermittent asthma  -Advair  -prn albuterol     History of Present Illness:      Yudy Lama is a 74 year old female with essential hypertension, dyslipidemia, GERD and asthma who presents with abdominal pain. Patient recently admitted for abdominal pain d/t suspected perforated gastric ulcer (ruled out) and acute vs chronic cholecystitis. 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 Patient treated with abx and bowel rest. Diet initiated and tolerated. She was discharged home. Patient returns with abdominal pain. She was found to have incarcerated ventral hernia with SBO for which she underwent exlap, open ventral hernia repair, appendectomy, partial omentectomy . Patient seen post-op. She states she is feeling better. Has abdominal pain at the margins. No nausea or vomiting. She was short of breath at home and on arrival in the ER, given Lasix to which she responded positively. Patient remains on oxygen, but is breathing better.     Brief  Synopsis: Patient was admitted, taken for exploratory laparotomy with hernia repair, oxygen requirements were weaned from 8 to 10 L all the way off to room air at rest but still required half a liter only with exertion so patient is being discharged with home O2 and outpatient PCP kate.  Encouraged to continue spirometry and ambulation as tolerated.  She will follow-up with general surgery but postoperatively did well with diet advancement.  Patient being discharged home in stable condition.    Lace+ Score: 71  59-90 High Risk  29-58 Medium Risk  0-28   Low Risk       TCM Follow-Up Recommendation:  LACE > 58: High Risk of readmission after discharge from the hospital.      Procedures during hospitalization:    Exploratory Laparotomy, hernia repair, appendectomy    Incidental or significant findings and recommendations (brief descriptions):  N/a    Lab/Test results pending at Discharge:   N/a    Consultants:  Surgery    Discharge Medication List:     Discharge Medications        CONTINUE taking these medications        Instructions Prescription details   Advair -21 MCG/ACT Aero  Generic drug: fluticasone-salmeterol      TAKE 2 PUFFS BY MOUTH TWICE A DAY **$492   Quantity: 3 each  Refills: 2     albuterol 108 (90 Base) MCG/ACT Aers  Commonly known as: Ventolin HFA      Inhale 2 puffs into the lungs every 6 (six) hours as needed for Wheezing.   Quantity: 18 g  Refills: 3     albuterol (2.5 MG/3ML) 0.083% Nebu  Commonly known as: Ventolin      Take 3 mL (2.5 mg total) by nebulization every 6 (six) hours as needed for Wheezing.   Quantity: 1 each  Refills: 5     aspirin 325 MG Tabs      Take 1 tablet (325 mg total) by mouth daily.   Refills: 0     clobetasol 0.05 % Oint  Commonly known as: Temovate      Apply twice daily to affected areas, reduce to using once, daily as the areas begin to resolve.   Quantity: 60 g  Refills: 5     dicyclomine 10 MG Caps  Commonly known as: Bentyl      TAKE 1 CAPSULE (10 MG TOTAL)  BY MOUTH 4 (FOUR) TIMES DAILY BEFORE MEALS AND NIGHTLY.   Quantity: 120 capsule  Refills: 2     fenofibrate 145 MG Tabs  Commonly known as: Tricor      Take 1 tablet (145 mg total) by mouth once daily.   Quantity: 90 tablet  Refills: 1     Florastor 250 MG Caps  Generic drug: saccharomyces boulardii      As directed   Quantity: 1 capsule  Refills: 0     lisinopril 10 MG Tabs  Commonly known as: Zestril      Take 1 tablet (10 mg total) by mouth daily.   Refills: 0     Magnesium Oxide 140 MG Caps      500 MG PO Q DAY   Quantity: 28 capsule  Refills: 0     Nebulizer Dacia      One nebulizer   Quantity: 1 each  Refills: 0     Oysco 500 500 MG Tabs  Generic drug: calcium carbonate      TAKE 1 TABLET BY MOUTH 3 TIMES A DAY   Quantity: 90 tablet  Refills: 0     pantoprazole 40 MG Tbec  Commonly known as: Protonix      Take 1 tablet (40 mg total) by mouth daily as needed.   Quantity: 90 tablet  Refills: 1     rosuvastatin 5 MG Tabs  Commonly known as: Crestor      Take 1 tablet (5 mg total) by mouth nightly.   Quantity: 90 tablet  Refills: 1     silver sulfADIAZINE 1 % Crea  Commonly known as: Silvadene      Apply 1 Application topically 3 (three) times daily.   Quantity: 50 g  Refills: 0              [place] n[place]    Follow-up appointment:   No follow-up provider specified.  Appointments for Next 30 Days 2024 - 10/24/2024      None            Vital signs:  Temp:  [97.8 °F (36.6 °C)-98.4 °F (36.9 °C)] 98.2 °F (36.8 °C)  Pulse:  [70-73] 73  Resp:  [16-18] 16  BP: ()/(49-61) 93/49  SpO2:  [91 %-92 %] 91 %    Physical Exam:    General: No acute distress   Lungs: clear to auscultation  Cardiovascular: S1, S2  Abdomen: Soft      -----------------------------------------------------------------------------------------------  PATIENT DISCHARGE INSTRUCTIONS: See electronic chart    Michel Denis DO    Total time spent on discharge plannin minutes     The  Cures Act makes medical notes like these  available to patients in the interest of transparency. Please be advised this is a medical document. Medical documents are intended to carry relevant information, facts as evident, and the clinical opinion of the practitioner. The medical note is intended as peer to peer communication and may appear blunt or direct. It is written in medical language and may contain abbreviations or verbiage that are unfamiliar.

## 2024-09-24 NOTE — PLAN OF CARE
Assumed care at 1930. A&O X4, 1L NC, VSS, no tele. Cardiac electrolyte replacement protocol. Lab draw. PIV in L AC flushed and capped. Dressing CDI. Lovenox for VTE. Full liquid diet, tolerating well. Bowel sounds active, last BM 9/23. Continent. PRN tylenol x1 for pain- see MAR. Up SBA. ALEJANDRINA drain intact with minimal drainage. Wound vac intact with no drainage.     Problem: GASTROINTESTINAL - ADULT  Goal: Maintains or returns to baseline bowel function  Description: INTERVENTIONS:  - Assess bowel function  - Maintain adequate hydration with IV or PO as ordered and tolerated  - Evaluate effectiveness of GI medications  - Encourage mobilization and activity  - Obtain nutritional consult as needed  - Establish a toileting routine/schedule  - Consider collaborating with pharmacy to review patient's medication profile  Outcome: Progressing  Goal: Maintains adequate nutritional intake (undernourished)  Description: INTERVENTIONS:  - Monitor percentage of each meal consumed  - Identify factors contributing to decreased intake, treat as appropriate  - Assist with meals as needed  - Monitor I&O, WT and lab values  - Obtain nutritional consult as needed  - Optimize oral hygiene and moisture  - Encourage food from home; allow for food preferences  - Enhance eating environment  Outcome: Progressing     Problem: SKIN/TISSUE INTEGRITY - ADULT  Goal: Skin integrity remains intact  Description: INTERVENTIONS  - Assess and document risk factors for pressure ulcer development  - Assess and document skin integrity  - Monitor for areas of redness and/or skin breakdown  - Initiate interventions, skin care algorithm/standards of care as needed  Outcome: Progressing  Goal: Incision(s), wounds(s) or drain site(s) healing without S/S of infection  Description: INTERVENTIONS:  - Assess and document risk factors for pressure ulcer development  - Assess and document skin integrity  - Assess and document dressing/incision, wound bed, drain sites  and surrounding tissue  - Implement wound care per orders  - Initiate isolation precautions as appropriate  - Initiate Pressure Ulcer prevention bundle as indicated  Outcome: Progressing     Problem: DISCHARGE PLANNING  Goal: Discharge to home or other facility with appropriate resources  Description: INTERVENTIONS:  - Identify barriers to discharge w/pt and caregiver  - Include patient/family/discharge partner in discharge planning  - Arrange for needed discharge resources and transportation as appropriate  - Identify discharge learning needs (meds, wound care, etc)  - Arrange for interpreters to assist at discharge as needed  - Consider post-discharge preferences of patient/family/discharge partner  - Complete POLST form as appropriate  - Assess patient's ability to be responsible for managing their own health  - Refer to Case Management Department for coordinating discharge planning if the patient needs post-hospital services based on physician/LIP order or complex needs related to functional status, cognitive ability or social support system  Outcome: Progressing

## 2024-09-24 NOTE — PROGRESS NOTES
09/24/24 1500   Mobility   O2 walk? Yes   SPO2% on Room Air at Rest 85   SPO2% on Oxygen at Rest 93   At rest oxygen flow (liters per minute) 0   SPO2% Ambulation on Oxygen 90   Ambulation oxygen flow (liters per minute) 0.5     O2 walk. MD and SW notified.

## 2024-09-24 NOTE — DISCHARGE INSTRUCTIONS
It was recommended that you use oxygen at home to facilitate your recovery and compliment your treatment.  The [facility] team has set up delivery with [facility].  Contact information: Lay Parisi [address] [place] [id number].  Phone: [phone]..     If you experienced any difficulties with the delivery, please contact the [place] Case Management department at [phone].    Utilize abdominal binder as needed for comfort.

## 2024-09-24 NOTE — PROGRESS NOTES
[facility]   part of [facility] with [facility]     Hospitalist Progress Note     Yudy Lama Patient Status:  Inpatient    1950 MRN [id number]   Location [facility] 3NE-A Attending Antonio Mcmillan MD   Hosp Day # 3 PCP Robert Kraus MD     Chief Complaint: Abdominal pain    Subjective:     Tolerating full liquid diet  Breathing is good  No complaints     Objective:    Review of Systems:   A comprehensive review of systems was completed; pertinent positive and negatives stated in subjective.    Vital signs:  Temp:  [97.8 °F (36.6 °C)-98.4 °F (36.9 °C)] 98.2 °F (36.8 °C)  Pulse:  [70-73] 73  Resp:  [16-18] 16  BP: ()/(49-61) 93/49  SpO2:  [91 %-92 %] 91 %    Physical Exam:    General: No acute distress  Respiratory: CTAB with mild diminishment at bases  Cardiovascular: S1, S2, regular rate and rhythm  Abdomen: midline incision bandaged,  wound vac, ALEJANDRINA drain with SS output   Neuro: No new focal deficits.   Extremities: No edema      Diagnostic Data:    Labs:  Recent Labs   Lab 24  0536 24  1010 24  0630   WBC 5.9 9.7 12.7* 10.0   HGB 10.9* 13.4 12.4 11.6*   MCV 93.5 91.9 92.9 94.3   .0 311.0 274.0 248.0   INR  --  1.04  --   --        Recent Labs   Lab 24  1010 24  0630 24  1641 24  0151 24  0811   * 171* 93 107*  --  91   BUN 7* 7* 11 10  --  12   CREATSERUM 0.55 0.62 0.59 0.58  --  0.49*   CA 10.3 9.5 9.4 9.7  --  9.7   ALB 4.2 3.5 3.5  --   --   --     142 141 139  --  138   K 3.8 3.8 4.0  4.0 3.6 4.4 4.2    106 104 100  --  103   CO2 27.0 26.0 32.0 33.0*  --  30.0   ALKPHO 113 107 92  --   --   --    AST 30 27 21  --   --   --    ALT 64* 50* 34  --   --   --    BILT 1.2* 0.8 0.7  --   --   --    TP 7.6 7.0 6.4  --   --   --        Estimated Creatinine Clearance: 70.2 mL/min (A) (based on SCr of 0.49 mg/dL (L)).    Recent Labs   Lab 24  1010   TROPHS 36* 26       Recent  Labs   Lab 09/20/24 2042   PTP 13.6   INR 1.04                  Microbiology    No results found for this visit on 09/20/24.      Imaging: Reviewed in Epic.    Medications:    lisinopril  10 mg Oral Daily    fenofibrate micronized  134 mg Oral Daily with breakfast    pantoprazole  40 mg Oral QAM AC    rosuvastatin  5 mg Oral Nightly    fluticasone-salmeterol  2 puff Inhalation BID    enoxaparin  40 mg Subcutaneous Nightly       Assessment & Plan:      #Incarcerated ventral hernia with small bowel obstruction s/p Exploratory Laparotomy, hernia repair, appendectomy  -Date of surgery 9/21/2024  -Diet per surgery - FLD -> Soft  -Rosales removed   -Pain control      #Pulmonary edema, Acute on chronic HFpEF  -ECHO notable for diastolic dysfunction  -improved with diuresis, will hold further     #Acute hypoxic respiratory failure 2/2 above + Atelectasis   -Diurese as tolerated  -IS and ambulation encouraged   -Wean O2 as able, Ambulatory O2 walk     #Essential hypertension  -PTA: Lisinopril    #Hyperlipidemia  -PTA: Rosuvastatin, fenofibrate     #GERD  -PTA: PPI    #Mild intermittent asthma  -Advair  -prn albuterol     POC:    Discharge when okay with General surgery, today possibly    I had a face to face visit with this patient and I evaluated the patient's O2 saturations. The patient is mobile in their home and is in need of a portable oxygen tank. The home oxygen will improve the patient's condition.       Michel Denis DO    Supplementary Documentation:     Quality:  DVT Mechanical Prophylaxis:   SCDs, Early ambuation  DVT Pharmacologic Prophylaxis   Medication    enoxaparin (Lovenox) 40 MG/0.4ML SUBQ injection 40 mg                Code Status: Full Code  Rosales: No urinary catheter in place      The 21st Century Cures Act makes medical notes like these available to patients in the interest of transparency. Please be advised this is a medical document. Medical documents are intended to carry relevant information, facts  as evident, and the clinical opinion of the practitioner. The medical note is intended as peer to peer communication and may appear blunt or direct. It is written in medical language and may contain abbreviations or verbiage that are unfamiliar.              **Certification      PHYSICIAN Certification of Need for Inpatient Hospitalization - Initial Certification    Patient will require inpatient services that will reasonably be expected to span two midnight's based on the clinical documentation in H+P.   Based on patients current state of illness, I anticipate that, after discharge, patient will require TBD.

## 2024-09-24 NOTE — PROGRESS NOTES
[facility]  Progress Note    Yudy SEBAS Kelby Patient Status:  Inpatient    1950 MRN [id number]   Location [facility] 3NE-A Attending Antonio Mcmillan MD   Hosp Day # 3 PCP Robert Kraus MD     Subjective:  The patient is resting in bed. She denies abdominal pain. She reports some mild nausea last night after eating food. She reports passing flatus and having multiple loose bowel movements.     Objective/Physical Exam:  General: Alert, orientated x3.  Cooperative.  No apparent distress.  Vital Signs:  Blood pressure 110/61, pulse 70, temperature 98.4 °F (36.9 °C), temperature source Oral, resp. rate 16, height 60\", weight 164 lb 15.9 oz (74.8 kg), last menstrual period 1995, SpO2 92%, not currently breastfeeding.  HEENT: Normocephalic, atraumatic. No scleral icterus.  Abdomen:  Soft, non-distended, appropriately tender, with no rebound or guarding.  No peritoneal signs.  Incision: Dry, clean, and intact with Prevena wound vac in place. No surrounding erythema or drainage. No signs of infection.  Drain: in place with 10ml of serosangineous output over the past 24 hours.    Labs:  CBC:    Lab Results   Component Value Date    WBC 10.0 2024    WBC 12.7 (H) 2024    WBC 9.7 2024     Lab Results   Component Value Date    HEMOGLOBIN 14.9 2016    HEMOGLOBIN 14.3 2016    HEMOGLOBIN 14.9 2016    HGB 11.6 (L) 2024    HGB 12.4 2024    HGB 13.4 2024      Lab Results   Component Value Date    .0 2024    .0 2024    .0 2024     Lab Results   Component Value Date    CREATSERUM 0.58 2024    BUN 10 2024     2024    K 4.4 2024     2024    CO2 33.0 2024     2024    CA 9.7 2024    MG 1.6 2024         Assessment/Plan:  Patient Active Problem List   Diagnosis    Essential hypertension    Mixed hyperlipidemia    Other hemorrhoids    Mild intermittent  asthma without complication (HCC)    Colon cancer screening    Eczema    Medicare annual wellness visit, subsequent    Visit for screening mammogram    Fatty liver    Asymptomatic varicose veins of unspecified lower extremity    Calculus of gallbladder without cholecystitis without obstruction    Atherosclerosis of coronary artery    Gastroesophageal reflux disease without esophagitis    Benign hematuria    Atrophic vaginitis    Primary generalized (osteo)arthritis    Irreducible umbilical hernia    Chronic venous insufficiency    History of renal calculi    Post-menopause    Abnormal chest CT    Impaired fasting glucose    Diverticulosis of colon    Hx of colonic polyps    Other irritable bowel syndrome    Well woman exam with routine gynecological exam    Kappa light chain deposition disorder (HCC)    Bronchiectasis without complication (HCC)    Acute gastric ulcer with perforation (HCC)    Gastric fistula    Incarcerated hernia    Dyspnea, unspecified type     POD 3 exploratory laparotomy, open ventral hernia repair with biologic mesh, appendectomy, partial omentectomy, mesh explantation    Advance to soft diet.  Continue wound and drain care. Prevena wound vac to stay in place until POD 5.  Continue abdominal binder as needed.  Continue pain control and antiemetics as needed.  Encourage ambulation and up to chair.  DVT prophylaxis with Lovenox  GI prophylaxis with Protonix.  The patient is stable for discharge home once cleared by other services. Hopeful discharge in the next 24-48 hours.      STEFANIE Romo  9/24/2024  9:09 AM     This note was initiated by Parul Alvarenga.  The PA saw the patient in conjunction with me. The PA performed a history, exam, and developed the assessment and plan in conjunction with me. I agree with the above note and have made changes which reflect my own history and physical, if necessary.    Continues to do well  Passing flatus and having bowel movements  Tolerating liquids  without any nausea vomiting  Okay to advance diet to soft  Continue pain control as needed  Continue drain care  If patient tolerates soft diet without any nausea, vomiting, or abdominal pain, she would be cleared from a surgical standpoint for discharge home  Will remove Prevena prior to discharge  Patient will need to follow-up in 1 week for drain removal  Surgery will continue to follow  Rest of care per primary    Selin Conley MD  [facility] General Surgery  9/24/2024  5:19 PM

## 2024-09-24 NOTE — CM/SW NOTE
Notified by RN pt will need home oxygen. GEOFFREY sent referral to [facility] who stated she will need O2 order, O2 verbiage, and O2 walk.     Below is needed information for home oxygen.     Documentation for O2 sats: (RN to add a progress note with either o2 walk written out or flow sheet added to progress note )  SPO2% on Room Air at Rest:   SPO2% on Oxygen at Rest:   At rest oxygen flow (liters per minute):   SPO2% Ambulation on Oxygen:   Ambulation oxygen flow (liters per minute):     OR      (Found in the RN flowsheets called daily cares)   Mobility   O2 walk? Yes   SPO2% on Room Air at Rest    SPO2% on Oxygen at Rest    At rest oxygen flow (liters per minute)    SPO2% Ambulation on Oxygen    Ambulation oxygen flow (liters per minute)         (Reminder: The \"at rest\" and \"while ambulating\" are required statements. If patient is non ambulatory you can use \"with exertion\" such as during a transfer to assess their O2 level)    MD to document AFTER O2 sats  I had a face to face visit with this patient and I evaluated the patient's O2 saturations.  The patient is mobile in their home and is in need of a portable oxygen tank.  The home oxygen will improve the patient's condition.    Once O2 sats and MD verbiage are completed and IF home O2 is indicated, GEOFFREY to place VALENTIN in [facility] and request MD to cosign as appropriate. (Dx: CHF)    BAM Cohen  Discharge Planner

## 2024-09-24 NOTE — PLAN OF CARE
Assumed care at 0730.  A&O 4.  Oxygen- 1 L via NC. Attempting to wean back to baseline (room air) based on oxygen saturations this shift. O2 walk completed per order. See notes for more details.   IS at bedside and use encouraged.  Diet advanced to low fiber/soft per MD.   No tele.   VTE- Lovenox.   Wound vac to abd with no output noted. ALEJANDRINA drain with minimal sanguinous drainage.   Cardiac electrolyte replacement protocol. Magnesium (1.6) replaced. See MAR and managed orders for more details.  Abd binder utilized as needed when up and out of bed for comfort.  SBA.     Pt oriented to the room, safety prec initiated, bed is in the lowest position and call light within reach. Pt updated on the plan of care. All needs met at this time.    Problem: GASTROINTESTINAL - ADULT  Goal: Maintains or returns to baseline bowel function  Description: INTERVENTIONS:  - Assess bowel function  - Maintain adequate hydration with IV or PO as ordered and tolerated  - Evaluate effectiveness of GI medications  - Encourage mobilization and activity  - Obtain nutritional consult as needed  - Establish a toileting routine/schedule  - Consider collaborating with pharmacy to review patient's medication profile  Outcome: Progressing  Goal: Maintains adequate nutritional intake (undernourished)  Description: INTERVENTIONS:  - Monitor percentage of each meal consumed  - Identify factors contributing to decreased intake, treat as appropriate  - Assist with meals as needed  - Monitor I&O, WT and lab values  - Obtain nutritional consult as needed  - Optimize oral hygiene and moisture  - Encourage food from home; allow for food preferences  - Enhance eating environment  Outcome: Progressing     Problem: SKIN/TISSUE INTEGRITY - ADULT  Goal: Skin integrity remains intact  Description: INTERVENTIONS  - Assess and document risk factors for pressure ulcer development  - Assess and document skin integrity  - Monitor for areas of redness and/or skin  breakdown  - Initiate interventions, skin care algorithm/standards of care as needed  Outcome: Progressing  Goal: Incision(s), wounds(s) or drain site(s) healing without S/S of infection  Description: INTERVENTIONS:  - Assess and document risk factors for pressure ulcer development  - Assess and document skin integrity  - Assess and document dressing/incision, wound bed, drain sites and surrounding tissue  - Implement wound care per orders  - Initiate isolation precautions as appropriate  - Initiate Pressure Ulcer prevention bundle as indicated  Outcome: Progressing     Problem: DISCHARGE PLANNING  Goal: Discharge to home or other facility with appropriate resources  Description: INTERVENTIONS:  - Identify barriers to discharge w/pt and caregiver  - Include patient/family/discharge partner in discharge planning  - Arrange for needed discharge resources and transportation as appropriate  - Identify discharge learning needs (meds, wound care, etc)  - Arrange for interpreters to assist at discharge as needed  - Consider post-discharge preferences of patient/family/discharge partner  - Complete POLST form as appropriate  - Assess patient's ability to be responsible for managing their own health  - Refer to Case Management Department for coordinating discharge planning if the patient needs post-hospital services based on physician/LIP order or complex needs related to functional status, cognitive ability or social support system  Outcome: Progressing

## 2024-09-24 NOTE — CM/SW NOTE
GEOFFREY attached O2 order, progress note, and O2 walk to [facility] referral. GEOFFREY spoke with Anabel at [facility] who stated pt will need insurance authorization before a portable tank can be dispensed. Anabel stated she has reached out to her insurance authorization team at [facility] to inquire if authorization can be submitted/obtained today. Updated RN. SW will continue to follow.     Addendum (5pm) - GEOFFREY received call from Anabel at [facility] who stated [facility] received insurance approval and portable O2 tank can be dispensed. Anabel stated the oxygen will be delivered tonight and [facility] will reach out to pt regarding delivery time.     Updated RN. GEOFFREY informed RN to reach out to RT to dispense portable O2 tank.     [facility] contact information placed on AVS.     BAM Cohen  Discharge Planner

## 2024-09-25 NOTE — PROGRESS NOTES
NURSING DISCHARGE NOTE    Discharged Home via Wheelchair.  Accompanied by RN and Spouse  Belongings Taken by patient/family.  Peripheral IV discontinued. AVS reviewed and sent home with the patient along with abdominal binder and container to measure/keep track of ALEJANDRINA drain output. Wound vac C/D/I and information provided in AVS regarding removal. All questions answered at this time.

## 2024-09-26 ENCOUNTER — OFFICE VISIT (OUTPATIENT)
Facility: LOCATION | Age: 74
End: 2024-09-26
Payer: MEDICARE

## 2024-09-26 VITALS
OXYGEN SATURATION: 98 % | RESPIRATION RATE: 18 BRPM | HEIGHT: 60 IN | SYSTOLIC BLOOD PRESSURE: 110 MMHG | TEMPERATURE: 97 F | WEIGHT: 164 LBS | DIASTOLIC BLOOD PRESSURE: 67 MMHG | HEART RATE: 87 BPM | BODY MASS INDEX: 32.2 KG/M2

## 2024-09-26 DIAGNOSIS — Z90.49 STATUS POST APPENDECTOMY: ICD-10-CM

## 2024-09-26 DIAGNOSIS — Z98.890 STATUS POST REPAIR OF RECURRENT VENTRAL HERNIA: ICD-10-CM

## 2024-09-26 DIAGNOSIS — Z98.890 POSTOPERATIVE STATE: Primary | ICD-10-CM

## 2024-09-26 DIAGNOSIS — Z87.19 STATUS POST REPAIR OF RECURRENT VENTRAL HERNIA: ICD-10-CM

## 2024-09-26 PROCEDURE — 3008F BODY MASS INDEX DOCD: CPT

## 2024-09-26 PROCEDURE — 99024 POSTOP FOLLOW-UP VISIT: CPT

## 2024-09-26 PROCEDURE — 3074F SYST BP LT 130 MM HG: CPT

## 2024-09-26 PROCEDURE — 1160F RVW MEDS BY RX/DR IN RCRD: CPT

## 2024-09-26 PROCEDURE — 1111F DSCHRG MED/CURRENT MED MERGE: CPT

## 2024-09-26 PROCEDURE — 3078F DIAST BP <80 MM HG: CPT

## 2024-09-26 PROCEDURE — 1159F MED LIST DOCD IN RCRD: CPT

## 2024-09-26 NOTE — PAYOR COMM NOTE
--------------  DISCHARGE REVIEW    Payor: [employer] MEDICARE ADV PPO  Subscriber #:  [id number]  Authorization Number: [id number]    Admit date: 24  Admit time:   6:03 AM  Discharge Date: 2024  6:27 PM     Admitting Physician: Antonio Mcmillan MD  Attending Physician:  Bradly Fuentes MD  Primary Care Physician: Robert Kraus MD          Discharge Summary Notes        Discharge Summary signed by Michel Denis DO at 2024  4:18 PM       Author: Michel Denis DO Specialty: HOSPITALIST, Internal Medicine Author Type: Physician    Filed: 2024  4:18 PM Date of Service: 2024  4:15 PM Status: Signed    : Michel Denis DO (Physician)           [facility]     Yudy Lama Patient Status:  Inpatient    1950 MRN [id number]   Location [facility] 3NE-A Attending Antonio Mcmillan MD   Hosp Day # 3 PCP Robert Kraus MD     Date of Admission: 2024  Date of Discharge:   2024    Discharge Disposition: Home or Self Care    Discharge Diagnosis:  #Incarcerated ventral hernia with small bowel obstruction s/p Exploratory Laparotomy, hernia repair, appendectomy  -Date of surgery 2024  -Diet per surgery - FLD -> Soft  -Rosales removed   -Pain control      #Pulmonary edema, Acute on chronic HFpEF  -ECHO notable for diastolic dysfunction  -improved with diuresis, will hold further      #Acute hypoxic respiratory failure 2/2 above + Atelectasis   -Diurese as tolerated  -IS and ambulation encouraged   -Wean O2 as able, Ambulatory O2 walk      #Essential hypertension  -PTA: Lisinopril     #Hyperlipidemia  -PTA: Rosuvastatin, fenofibrate     #GERD  -PTA: PPI    #Mild intermittent asthma  -Advair  -prn albuterol     History of Present Illness:      Yudy Lama is a 74 year old female with essential hypertension, dyslipidemia, GERD and asthma who presents with abdominal pain. Patient recently admitted for abdominal pain d/t suspected perforated gastric  ulcer (ruled out) and acute vs chronic cholecystitis. 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 Patient treated with abx and bowel rest. Diet initiated and tolerated. She was discharged home. Patient returns with abdominal pain. She was found to have incarcerated ventral hernia with SBO for which she underwent exlap, open ventral hernia repair, appendectomy, partial omentectomy 9/21. Patient seen post-op. She states she is feeling better. Has abdominal pain at the margins. No nausea or vomiting. She was short of breath at home and on arrival in the ER, given Lasix to which she responded positively. Patient remains on oxygen, but is breathing better.     Brief Synopsis: Patient was admitted, taken for exploratory laparotomy with hernia repair, oxygen requirements were weaned from 8 to 10 L all the way off to room air at rest but still required half a liter only with exertion so patient is being discharged with home O2 and outpatient PCP eval.  Encouraged to continue spirometry and ambulation as tolerated.  She will follow-up with general surgery but postoperatively did well with diet advancement.  Patient being discharged home in stable condition.    Lace+ Score: 71  59-90 High Risk  29-58 Medium Risk  0-28   Low Risk       TCM Follow-Up Recommendation:  LACE > 58: High Risk of readmission after discharge from the hospital.      Procedures during hospitalization:    Exploratory Laparotomy, hernia repair, appendectomy    Incidental or significant findings and recommendations (brief descriptions):  N/a    Lab/Test results pending at Discharge:   N/a    Consultants:  Surgery    Discharge Medication List:     Discharge Medications        CONTINUE taking these medications        Instructions Prescription details   Advair -21 MCG/ACT Aero  Generic drug: fluticasone-salmeterol      TAKE 2 PUFFS BY MOUTH TWICE A DAY **$492   Quantity: 3 each  Refills: 2     albuterol 108 (90 Base) MCG/ACT Aers  Commonly known  as: Ventolin HFA      Inhale 2 puffs into the lungs every 6 (six) hours as needed for Wheezing.   Quantity: 18 g  Refills: 3     albuterol (2.5 MG/3ML) 0.083% Nebu  Commonly known as: Ventolin      Take 3 mL (2.5 mg total) by nebulization every 6 (six) hours as needed for Wheezing.   Quantity: 1 each  Refills: 5     aspirin 325 MG Tabs      Take 1 tablet (325 mg total) by mouth daily.   Refills: 0     clobetasol 0.05 % Oint  Commonly known as: Temovate      Apply twice daily to affected areas, reduce to using once, daily as the areas begin to resolve.   Quantity: 60 g  Refills: 5     dicyclomine 10 MG Caps  Commonly known as: Bentyl      TAKE 1 CAPSULE (10 MG TOTAL) BY MOUTH 4 (FOUR) TIMES DAILY BEFORE MEALS AND NIGHTLY.   Quantity: 120 capsule  Refills: 2     fenofibrate 145 MG Tabs  Commonly known as: Tricor      Take 1 tablet (145 mg total) by mouth once daily.   Quantity: 90 tablet  Refills: 1     Florastor 250 MG Caps  Generic drug: saccharomyces boulardii      As directed   Quantity: 1 capsule  Refills: 0     lisinopril 10 MG Tabs  Commonly known as: Zestril      Take 1 tablet (10 mg total) by mouth daily.   Refills: 0     Magnesium Oxide 140 MG Caps      500 MG PO Q DAY   Quantity: 28 capsule  Refills: 0     Nebulizer Dacia      One nebulizer   Quantity: 1 each  Refills: 0     Oysco 500 500 MG Tabs  Generic drug: calcium carbonate      TAKE 1 TABLET BY MOUTH 3 TIMES A DAY   Quantity: 90 tablet  Refills: 0     pantoprazole 40 MG Tbec  Commonly known as: Protonix      Take 1 tablet (40 mg total) by mouth daily as needed.   Quantity: 90 tablet  Refills: 1     rosuvastatin 5 MG Tabs  Commonly known as: Crestor      Take 1 tablet (5 mg total) by mouth nightly.   Quantity: 90 tablet  Refills: 1     silver sulfADIAZINE 1 % Crea  Commonly known as: Silvadene      Apply 1 Application topically 3 (three) times daily.   Quantity: 50 g  Refills: 0              [place] n[place]    Follow-up appointment:   No follow-up  provider specified.  Appointments for Next 30 Days 2024 - 10/24/2024      None            Vital signs:  Temp:  [97.8 °F (36.6 °C)-98.4 °F (36.9 °C)] 98.2 °F (36.8 °C)  Pulse:  [70-73] 73  Resp:  [16-18] 16  BP: ()/(49-61) 93/49  SpO2:  [91 %-92 %] 91 %    Physical Exam:    General: No acute distress   Lungs: clear to auscultation  Cardiovascular: S1, S2  Abdomen: Soft      -----------------------------------------------------------------------------------------------  PATIENT DISCHARGE INSTRUCTIONS: See electronic chart    Michel Denis DO    Total time spent on discharge plannin minutes     The  [employer] Act makes medical notes like these available to patients in the interest of transparency. Please be advised this is a medical document. Medical documents are intended to carry relevant information, facts as evident, and the clinical opinion of the practitioner. The medical note is intended as peer to peer communication and may appear blunt or direct. It is written in medical language and may contain abbreviations or verbiage that are unfamiliar.       Electronically signed by Michel Denis DO on 2024  4:18 PM         REVIEWER COMMENTS

## 2024-09-26 NOTE — PROGRESS NOTES
Post Operative Visit Note       Active Problems  1. Postoperative state    2. Status post appendectomy    3. Status post repair of recurrent ventral hernia         Chief Complaint   Chief Complaint   Patient presents with    Post-Op     PO-09/21 HERNIA VENTRAL REPAIR WITH BIOLOGICAL  MESH , EXPLORATORY LAPAROTOMY,   OMENTECTOMY & APPENDECTOMY. States that she is doing well               History of Present Illness   The patient presents for continued care and evaluation following a exploratory laparotomy, open ventral hernia repair with biologic mesh, appendectomy, partial omentectomy and mesh explantation with Dr. Conley on 9/21/2024.    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    The patient presents today for removal of Prevena wound VAC.    Overall, the patient is doing very well postoperatively.  She states she has some incisional site pain that is primarily with activity and changing positions.  She states overall her pain is tolerable.    She reports minimal appetite, but is tolerating a bland diet without nausea or vomiting.    She is having loose stools, but they are becoming more firm.    She states her ALEJANDRINA drain has had less than 17 mL of serosanguineous output for the last 3 days.        Allergies  Yudy has No Known Allergies.    Past Medical / Surgical / Social / Family History    The past medical and past surgical history have been reviewed by me today.     Past Medical History:    Atherosclerosis of coronary artery    Description: mild, medical therapy.     Atrophic vaginitis    Bundle branch block    Calculus of gallbladder without cholecystitis without obstruction    Chronic venous insufficiency    Cyst of ovary    Diverticulosis of colon    Left side    Diverticulosis of intestine    Eczema    Esophageal reflux    Gastroesophageal reflux disease with esophagitis    High blood  pressure    High cholesterol    Hx of colonic polyps    Hyperlipidemia    Hyperlipidemia, mixed    Hypertension, essential    Irritable bowel syndrome    KIDNEY STONE    Migraines    Mild intermittent asthma without complication (HCC)    Primary generalized (osteo)arthritis    Sleep apnea    Ventral hernia without obstruction or gangrene    Repaired 2020     Past Surgical History:   Procedure Laterality Date      1980    Colonoscopy  2015    Diverticulosis    Egd N/A 2021    Procedure: ESOPHAGOGASTRODUODENOSCOPY, COLONOSCOPY, POSSIBLE BIOPSY, POSSIBLE POLYPECTOMY 33809, 33745;  Surgeon: Sampson Damon MD;  Location: [facility]    Hernia surgery      Inguinal hernia repair  1968    Lithotripsy         and     Ventral hernia repair  2020    1.7 inch Ventralex ST hernia patch       The family history and social history have been reviewed by me today.    Family History   Problem Relation Age of Onset    Breast Cancer Father     Colon Cancer Father     Prostate Cancer Father     Heart Surgery Mother 63    Other (Other) Mother          at 77y/o;  AAA surgery.    Breast Cancer Maternal Grandmother 87        87    Heart Surgery Brother         Coronary stents in 40's; CABG in 50's    Other (Other) Brother         AAA repair    Heart Disease Brother      Social History     Socioeconomic History    Marital status:    Tobacco Use    Smoking status: Former     Current packs/day: 0.00     Types: Cigarettes     Quit date: 1/15/2008     Years since quittin.7    Smokeless tobacco: Never   Vaping Use    Vaping status: Never Used   Substance and Sexual Activity    Alcohol use: Yes     Alcohol/week: 0.0 standard drinks of alcohol     Comment: ocassionally    Drug use: Never        Current Outpatient Medications:     lisinopril 10 MG Oral Tab, Take 1 tablet (10 mg total) by mouth daily., Disp: , Rfl:     albuterol (2.5 MG/3ML) 0.083% Inhalation Nebu Soln,  Take 3 mL (2.5 mg total) by nebulization every 6 (six) hours as needed for Wheezing., Disp: 1 each, Rfl: 5    Respiratory Therapy Supplies (NEBULIZER) Does not apply Device, One nebulizer, Disp: 1 each, Rfl: 0    pantoprazole 40 MG Oral Tab EC, Take 1 tablet (40 mg total) by mouth daily as needed., Disp: 90 tablet, Rfl: 1    rosuvastatin 5 MG Oral Tab, Take 1 tablet (5 mg total) by mouth nightly., Disp: 90 tablet, Rfl: 1    fenofibrate 145 MG Oral Tab, Take 1 tablet (145 mg total) by mouth once daily., Disp: 90 tablet, Rfl: 1    ADVAIR -21 MCG/ACT Inhalation Aerosol, TAKE 2 PUFFS BY MOUTH TWICE A DAY **$492, Disp: 3 each, Rfl: 2    Albuterol Sulfate HFA (VENTOLIN HFA) 108 (90 Base) MCG/ACT Inhalation Aero Soln, Inhale 2 puffs into the lungs every 6 (six) hours as needed for Wheezing., Disp: 18 g, Rfl: 3    FLORASTOR 250 MG Oral Cap, As directed, Disp: 1 capsule, Rfl: 0    DICYCLOMINE HCL 10 MG Oral Cap, TAKE 1 CAPSULE (10 MG TOTAL) BY MOUTH 4 (FOUR) TIMES DAILY BEFORE MEALS AND NIGHTLY., Disp: 120 capsule, Rfl: 2    Clobetasol Propionate 0.05 % External Ointment, Apply twice daily to affected areas, reduce to using once, daily as the areas begin to resolve., Disp: 60 g, Rfl: 5    OYSCO 500 500 MG Oral Tab, TAKE 1 TABLET BY MOUTH 3 TIMES A DAY, Disp: 90 tablet, Rfl: 0    Magnesium Oxide 140 MG Oral Cap, 500 MG PO Q DAY, Disp: 28 capsule, Rfl: 0    silver sulfADIAZINE 1 % External Cream, Apply 1 Application topically 3 (three) times daily., Disp: 50 g, Rfl: 0    aspirin 325 MG Oral Tab, Take 1 tablet (325 mg total) by mouth daily., Disp: , Rfl:       Review of Systems  The Review of Systems has been reviewed by me during today.  Review of Systems    Physical Findings   /67   Pulse 87   Temp 96.6 °F (35.9 °C) (Temporal)   Resp 18   Ht 60\"   Wt 164 lb (74.4 kg)   LMP 01/01/1995   SpO2 98%   BMI 32.03 kg/m²   Physical Exam  Vitals and nursing note reviewed.   Constitutional:       General: She is not  in acute distress.     Appearance: Normal appearance.   HENT:      Head: Normocephalic and atraumatic.      Right Ear: External ear normal.      Left Ear: External ear normal.      Nose: Nose normal.   Eyes:      General: No scleral icterus.     Conjunctiva/sclera: Conjunctivae normal.   Abdominal:      General: Abdomen is flat. A surgical scar is present. There is no distension.      Palpations: Abdomen is soft. There is no mass.      Tenderness: There is no abdominal tenderness.      Hernia: No hernia is present.          Comments: Clinical exam of the abdomen reveals it to be soft, nondistended without tympany to percussion.  I took the opportunity at today's visit to remove her Prevena wound VAC.  Midline incision closed with staples is clean, dry, intact without surrounding erythema or cellulitis.  I also took the opportunity at today's visit to remove her ALEJANDRINA drain.  It had less than 10 cc of serosanguineous output at the time of removal.   Musculoskeletal:      Cervical back: Normal range of motion and neck supple.   Neurological:      Mental Status: She is alert.   Psychiatric:         Mood and Affect: Mood normal.         Behavior: Behavior normal.         Thought Content: Thought content normal.             Assessment   1. Postoperative state    2. Status post appendectomy    3. Status post repair of recurrent ventral hernia          Plan   The patient is doing well status post exploratory laparotomy, open ventral hernia repair with biologic mesh, appendectomy, partial omentectomy and mesh explantation .    I took the opportunity at this appointment to discuss the pathology with the patient which revealed mesothelial lined fibroadipose tissue consistent with hernia sac, tissue consistent with mesh material, partial fibrous obliteration of the appendix and omental fibroadipose tissue with focal fat necrosis.  No evidence of malignancy.    I discussed with the patient that they should refrain from any  bending, pushing, pulling, twisting, or lifting of a force greater than 15-20 pounds for 6 weeks post-op. Activity restrictions were reviewed.     The patient may shower. They should avoid scrubbing their incisions, but may allow soap and water to wash over the incisions.  No submerging the incisions.    I recommend an abdominal binder with activity.    The patient should continue a soft diet.    The patient may take ibuprofen and Tylenol as needed for pain management.  They may also utilize heating pads or ice packs for comfort measures.    The patient should follow-up with GI for her recent history of perforated gastric ulcer.  She currently has an appointment with them on 10/21/2024    I will also have this patient follow-up with Dr. Conley in approximately 10 days for staple removal and to discuss her perforated gastric ulcer and possible cholecysto gastric fistula.    All of the patient's questions were answered.  The patient verbalized understanding and agreement with the plan of care.           No orders of the defined types were placed in this encounter.      Imaging & Referrals   None    Follow Up  No follow-ups on file.    Tayler Gomez PA-C

## 2024-10-09 ENCOUNTER — OFFICE VISIT (OUTPATIENT)
Facility: LOCATION | Age: 74
End: 2024-10-09
Payer: MEDICARE

## 2024-10-09 VITALS
BODY MASS INDEX: 29.06 KG/M2 | HEIGHT: 60 IN | HEART RATE: 75 BPM | OXYGEN SATURATION: 100 % | SYSTOLIC BLOOD PRESSURE: 109 MMHG | DIASTOLIC BLOOD PRESSURE: 66 MMHG | TEMPERATURE: 97 F | WEIGHT: 148 LBS

## 2024-10-09 DIAGNOSIS — K43.6 INCARCERATED VENTRAL HERNIA: Primary | ICD-10-CM

## 2024-10-09 DIAGNOSIS — Z98.890 S/P HERNIA REPAIR: ICD-10-CM

## 2024-10-09 DIAGNOSIS — Z87.19 S/P HERNIA REPAIR: ICD-10-CM

## 2024-10-09 PROCEDURE — 3078F DIAST BP <80 MM HG: CPT | Performed by: STUDENT IN AN ORGANIZED HEALTH CARE EDUCATION/TRAINING PROGRAM

## 2024-10-09 PROCEDURE — 3074F SYST BP LT 130 MM HG: CPT | Performed by: STUDENT IN AN ORGANIZED HEALTH CARE EDUCATION/TRAINING PROGRAM

## 2024-10-09 PROCEDURE — 1111F DSCHRG MED/CURRENT MED MERGE: CPT | Performed by: STUDENT IN AN ORGANIZED HEALTH CARE EDUCATION/TRAINING PROGRAM

## 2024-10-09 PROCEDURE — 99024 POSTOP FOLLOW-UP VISIT: CPT | Performed by: STUDENT IN AN ORGANIZED HEALTH CARE EDUCATION/TRAINING PROGRAM

## 2024-10-09 PROCEDURE — 1159F MED LIST DOCD IN RCRD: CPT | Performed by: STUDENT IN AN ORGANIZED HEALTH CARE EDUCATION/TRAINING PROGRAM

## 2024-10-09 PROCEDURE — 3008F BODY MASS INDEX DOCD: CPT | Performed by: STUDENT IN AN ORGANIZED HEALTH CARE EDUCATION/TRAINING PROGRAM

## 2024-10-09 RX ORDER — CEPHALEXIN 500 MG/1
500 CAPSULE ORAL 2 TIMES DAILY
Qty: 14 CAPSULE | Refills: 0 | Status: SHIPPED | OUTPATIENT
Start: 2024-10-09 | End: 2024-10-16

## 2024-10-09 NOTE — PROGRESS NOTES
Patient ID: Yudy Lama is a 74 year old female.    Chief Complaint   Patient presents with    Post-Op     PO - staple removal and discuss gastric ulcer  HERNIA VENTRAL REPAIR WITH BIOLOGICL  MESH , EXPLORATORY LAPAROTOMY,  OMENTECTOMY & APPENDECTOMY, no symptoms.       HPI: Yudy Lama is a 74 year old female presents to clinic for follow-up.  Since discharge, patient has been doing well.  She has been tolerating a diet and having normal bowel function.  Pain has been controlled.    Workup: None      Past Medical History  Past Medical History:    Allergic rhinitis    Asthma (HCC)    Atherosclerosis of coronary artery    Description: mild, medical therapy.     Atrophic vaginitis    Bundle branch block    Calculus of gallbladder without cholecystitis without obstruction    Chronic venous insufficiency    Cyst of ovary    Diverticulosis of colon    Left side    Diverticulosis of intestine    Eczema    Esophageal reflux    Essential hypertension    Gastroesophageal reflux disease with esophagitis    High blood pressure    High cholesterol    History of stomach ulcers    Hx of colonic polyps    Hyperlipidemia    Hyperlipidemia, mixed    Hypertension, essential    Irritable bowel syndrome    KIDNEY STONE    Migraines    Mild intermittent asthma without complication (HCC)    Obesity    Primary generalized (osteo)arthritis    Sleep apnea    Ventral hernia without obstruction or gangrene    Repaired 2020       Past Surgical History  Past Surgical History:   Procedure Laterality Date    Appendectomy        1980    Colonoscopy  2015    Diverticulosis    D & c      Egd N/A 2021    Procedure: ESOPHAGOGASTRODUODENOSCOPY, COLONOSCOPY, POSSIBLE BIOPSY, POSSIBLE POLYPECTOMY 19427, 94454;  Surgeon: Sampson Damon MD;  Location: [facility]    Hernia surgery      Inguinal hernia repair  1968    Lithotripsy  2002       and     Ventral hernia repair   08/12/2020    1.7 inch Ventralex ST hernia patch       Medications  Current Outpatient Medications   Medication Sig Dispense Refill    cephALEXin (KEFLEX) 500 MG Oral Cap Take 1 capsule (500 mg total) by mouth 4 (four) times daily for 5 days. 20 capsule 0    cephALEXin (KEFLEX) 500 MG Oral Cap Take 1 capsule (500 mg total) by mouth 2 (two) times daily for 7 days. 14 capsule 0    lisinopril 10 MG Oral Tab Take 1 tablet (10 mg total) by mouth daily.      albuterol (2.5 MG/3ML) 0.083% Inhalation Nebu Soln Take 3 mL (2.5 mg total) by nebulization every 6 (six) hours as needed for Wheezing. 1 each 5    Respiratory Therapy Supplies (NEBULIZER) Does not apply Device One nebulizer 1 each 0    pantoprazole 40 MG Oral Tab EC Take 1 tablet (40 mg total) by mouth daily as needed. 90 tablet 1    rosuvastatin 5 MG Oral Tab Take 1 tablet (5 mg total) by mouth nightly. 90 tablet 1    fenofibrate 145 MG Oral Tab Take 1 tablet (145 mg total) by mouth once daily. 90 tablet 1    ADVAIR -21 MCG/ACT Inhalation Aerosol TAKE 2 PUFFS BY MOUTH TWICE A DAY **$492 3 each 2    Albuterol Sulfate HFA (VENTOLIN HFA) 108 (90 Base) MCG/ACT Inhalation Aero Soln Inhale 2 puffs into the lungs every 6 (six) hours as needed for Wheezing. 18 g 3    FLORASTOR 250 MG Oral Cap As directed 1 capsule 0    DICYCLOMINE HCL 10 MG Oral Cap TAKE 1 CAPSULE (10 MG TOTAL) BY MOUTH 4 (FOUR) TIMES DAILY BEFORE MEALS AND NIGHTLY. 120 capsule 2    Clobetasol Propionate 0.05 % External Ointment Apply twice daily to affected areas, reduce to using once, daily as the areas begin to resolve. 60 g 5    OYSCO 500 500 MG Oral Tab TAKE 1 TABLET BY MOUTH 3 TIMES A DAY 90 tablet 0    Magnesium Oxide 140 MG Oral Cap 500 MG PO Q DAY 28 capsule 0    silver sulfADIAZINE 1 % External Cream Apply 1 Application topically 3 (three) times daily. 50 g 0    aspirin 325 MG Oral Tab Take 1 tablet (325 mg total) by mouth daily.         Allergies  Allergies[1]    Social History  History    Smoking Status    Former    Types: Cigarettes   Smokeless Tobacco    Never     History   Alcohol Use    3.0 standard drinks of alcohol/week    3 Standard drinks or equivalent per week     Comment: ocassionally     History   Drug Use Unknown       Family History  Family History   Problem Relation Age of Onset    Breast Cancer Father     Colon Cancer Father     Prostate Cancer Father     Heart Surgery Mother 63    Other (Other) Mother          at 77y/o;  AAA surgery.    Heart Disease Mother     Breast Cancer Maternal Grandmother 87        87    Heart Surgery Brother         Coronary stents in 40's; CABG in 50's    Other (Other) Brother         AAA repair    Heart Disease Brother        Review of Systems  Review of Systems   Constitutional: Negative.    Respiratory: Negative.     Cardiovascular: Negative.    Gastrointestinal:  Negative for abdominal distention, abdominal pain, constipation, nausea and vomiting.       Exam  Vitals:    10/09/24 1003   BP: 109/66   Pulse: 75   Temp: 97.4 °F (36.3 °C)     Physical Exam  Constitutional:       Appearance: Normal appearance.   Cardiovascular:      Rate and Rhythm: Normal rate.   Pulmonary:      Effort: Pulmonary effort is normal.   Abdominal:      General: There is no distension.      Palpations: Abdomen is soft.      Tenderness: There is no abdominal tenderness.       Skin:     General: Skin is warm and dry.   Neurological:      Mental Status: She is alert and oriented to person, place, and time.           Assessment/Plan  Assessment   Problem List Items Addressed This Visit          Gastrointestinal and Abdominal    Incarcerated ventral hernia - Primary     Other Visit Diagnoses       S/P hernia repair                Yudy Lama is a 74 year old female status post laparotomy and open ventral hernia repair with biologic mesh for incarcerated ventral hernia    On physical exam, patient has some erythema surrounding an area of swelling  She most likely has a  seroma in place but there is no drainage  Will give prescription for Keflex   every other staple was removed and replaced with Mastisol and Steri-Strips  Patient will follow-up in 1 week for reevaluation    She can continue diet as tolerated  Bowel regimen as needed      Selin Conley MD  General Surgery  [facility]     CC:  Robert Kraus MD         [1] No Known Allergies

## 2024-10-10 ENCOUNTER — PATIENT MESSAGE (OUTPATIENT)
Facility: LOCATION | Age: 74
End: 2024-10-10

## 2024-10-10 ENCOUNTER — TELEPHONE (OUTPATIENT)
Facility: LOCATION | Age: 74
End: 2024-10-10

## 2024-10-10 RX ORDER — CEPHALEXIN 500 MG/1
500 CAPSULE ORAL 4 TIMES DAILY
Qty: 20 CAPSULE | Refills: 0 | Status: SHIPPED | OUTPATIENT
Start: 2024-10-10 | End: 2024-10-15

## 2024-10-10 NOTE — TELEPHONE ENCOUNTER
S/W patient patient informed that more antibiotics was sent to her pharmacy and that she is to take it every 6 hours.  Patient advised to call if she develops fever/chills or needs any assistance.  Patient advised to keep her follow up appointment.

## 2024-10-10 NOTE — TELEPHONE ENCOUNTER
Patient calling because she was told to call if there was any drainage from her incision. Patient reports drainage this morning. It hasn't happened before, but it soaked her underwear this morning. She is on antibiotics.     Please advise  Best callback number is [phone]    Future Appointments   Date Time Provider Department Center   10/23/2024  2:45 PM Selin Conley MD [id number]

## 2024-10-23 ENCOUNTER — OFFICE VISIT (OUTPATIENT)
Facility: LOCATION | Age: 74
End: 2024-10-23
Payer: MEDICARE

## 2024-10-23 VITALS
TEMPERATURE: 98 F | BODY MASS INDEX: 29.06 KG/M2 | HEIGHT: 60 IN | SYSTOLIC BLOOD PRESSURE: 100 MMHG | OXYGEN SATURATION: 97 % | HEART RATE: 81 BPM | DIASTOLIC BLOOD PRESSURE: 59 MMHG | WEIGHT: 148 LBS

## 2024-10-23 DIAGNOSIS — K43.6 INCARCERATED VENTRAL HERNIA: Primary | ICD-10-CM

## 2024-10-23 DIAGNOSIS — Z98.890 S/P REPAIR OF VENTRAL HERNIA: ICD-10-CM

## 2024-10-23 DIAGNOSIS — L98.8 FISTULA: ICD-10-CM

## 2024-10-23 DIAGNOSIS — Z87.19 S/P REPAIR OF VENTRAL HERNIA: ICD-10-CM

## 2024-10-23 PROCEDURE — 1111F DSCHRG MED/CURRENT MED MERGE: CPT | Performed by: STUDENT IN AN ORGANIZED HEALTH CARE EDUCATION/TRAINING PROGRAM

## 2024-10-23 PROCEDURE — 1159F MED LIST DOCD IN RCRD: CPT | Performed by: STUDENT IN AN ORGANIZED HEALTH CARE EDUCATION/TRAINING PROGRAM

## 2024-10-23 PROCEDURE — 3008F BODY MASS INDEX DOCD: CPT | Performed by: STUDENT IN AN ORGANIZED HEALTH CARE EDUCATION/TRAINING PROGRAM

## 2024-10-23 PROCEDURE — 99024 POSTOP FOLLOW-UP VISIT: CPT | Performed by: STUDENT IN AN ORGANIZED HEALTH CARE EDUCATION/TRAINING PROGRAM

## 2024-10-23 PROCEDURE — 3078F DIAST BP <80 MM HG: CPT | Performed by: STUDENT IN AN ORGANIZED HEALTH CARE EDUCATION/TRAINING PROGRAM

## 2024-10-23 PROCEDURE — 3074F SYST BP LT 130 MM HG: CPT | Performed by: STUDENT IN AN ORGANIZED HEALTH CARE EDUCATION/TRAINING PROGRAM

## 2024-10-23 NOTE — PROGRESS NOTES
Patient ID: Yudy Lama is a 74 year old female.    Chief Complaint   Patient presents with    Post-Op     PO - staple removal and discuss gastric ulcer  HERNIA VENTRAL REPAIR WITH BIOLOGICAL MESH , EXPLORATORY LAPAROTOMY, OMENTECTOMY & APPENDECTOMY w/LEH 2 week follow up, no symptoms.         HPI: Yudy Lama is a 74 year old female presents to clinic for follow-up.  Since last clinic appointment, patient had serous drainage from her midline incision.  She completed the course of antibiotics.  She denies any fevers or chills.  She states that the drainage has subsided greatly.  She denies any other symptoms.    Workup: None      Past Medical History  Past Medical History:    Allergic rhinitis    Asthma (HCC)    Atherosclerosis of coronary artery    Description: mild, medical therapy.     Atrophic vaginitis    Bundle branch block    Calculus of gallbladder without cholecystitis without obstruction    Chronic venous insufficiency    Cyst of ovary    Diverticulosis of colon    Left side    Diverticulosis of intestine    Eczema    Esophageal reflux    Essential hypertension    Gastroesophageal reflux disease with esophagitis    High blood pressure    High cholesterol    History of stomach ulcers    Hx of colonic polyps    Hyperlipidemia    Hyperlipidemia, mixed    Hypertension, essential    Irritable bowel syndrome    KIDNEY STONE    Migraines    Mild intermittent asthma without complication (HCC)    Obesity    Primary generalized (osteo)arthritis    Sleep apnea    Ventral hernia without obstruction or gangrene    Repaired 2020       Past Surgical History  Past Surgical History:   Procedure Laterality Date    Appendectomy        1980    Colonoscopy  2015    Diverticulosis    D & c      Egd N/A 2021    Procedure: ESOPHAGOGASTRODUODENOSCOPY, COLONOSCOPY, POSSIBLE BIOPSY, POSSIBLE POLYPECTOMY 66951, 76649;  Surgeon: Sampson Damon MD;  Location: [facility]     Hernia surgery      Inguinal hernia repair  1968    Lithotripsy  2002      1976 and 1977    Ventral hernia repair  2020    1.7 inch Ventralex ST hernia patch       Medications  Current Outpatient Medications   Medication Sig Dispense Refill    lisinopril 10 MG Oral Tab Take 1 tablet (10 mg total) by mouth daily.      albuterol (2.5 MG/3ML) 0.083% Inhalation Nebu Soln Take 3 mL (2.5 mg total) by nebulization every 6 (six) hours as needed for Wheezing. 1 each 5    Respiratory Therapy Supplies (NEBULIZER) Does not apply Device One nebulizer 1 each 0    pantoprazole 40 MG Oral Tab EC Take 1 tablet (40 mg total) by mouth daily as needed. 90 tablet 1    rosuvastatin 5 MG Oral Tab Take 1 tablet (5 mg total) by mouth nightly. 90 tablet 1    fenofibrate 145 MG Oral Tab Take 1 tablet (145 mg total) by mouth once daily. 90 tablet 1    ADVAIR -21 MCG/ACT Inhalation Aerosol TAKE 2 PUFFS BY MOUTH TWICE A DAY **$492 3 each 2    Albuterol Sulfate HFA (VENTOLIN HFA) 108 (90 Base) MCG/ACT Inhalation Aero Soln Inhale 2 puffs into the lungs every 6 (six) hours as needed for Wheezing. 18 g 3    FLORASTOR 250 MG Oral Cap As directed 1 capsule 0    DICYCLOMINE HCL 10 MG Oral Cap TAKE 1 CAPSULE (10 MG TOTAL) BY MOUTH 4 (FOUR) TIMES DAILY BEFORE MEALS AND NIGHTLY. 120 capsule 2    Clobetasol Propionate 0.05 % External Ointment Apply twice daily to affected areas, reduce to using once, daily as the areas begin to resolve. 60 g 5    OYSCO 500 500 MG Oral Tab TAKE 1 TABLET BY MOUTH 3 TIMES A DAY 90 tablet 0    Magnesium Oxide 140 MG Oral Cap 500 MG PO Q DAY 28 capsule 0    silver sulfADIAZINE 1 % External Cream Apply 1 Application topically 3 (three) times daily. 50 g 0    aspirin 325 MG Oral Tab Take 1 tablet (325 mg total) by mouth daily.         Allergies  Allergies[1]    Social History  History   Smoking Status    Former    Types: Cigarettes   Smokeless Tobacco    Never     History   Alcohol Use    3.0 standard  drinks of alcohol/week    3 Standard drinks or equivalent per week     Comment: ocassionally     History   Drug Use Unknown       Family History  Family History   Problem Relation Age of Onset    Breast Cancer Father     Colon Cancer Father     Prostate Cancer Father     Heart Surgery Mother 63    Other (Other) Mother          at 75y/o;  AAA surgery.    Heart Disease Mother     Breast Cancer Maternal Grandmother 87        87    Heart Surgery Brother         Coronary stents in 40's; CABG in 50's    Other (Other) Brother         AAA repair    Heart Disease Brother        Review of Systems  Review of Systems   Constitutional: Negative.    Respiratory: Negative.     Cardiovascular: Negative.    Gastrointestinal:  Negative for abdominal distention, abdominal pain, constipation, nausea and vomiting.       Exam  Vitals:    10/23/24 1506   BP: 100/59   Pulse: 81   Temp: 98 °F (36.7 °C)     Physical Exam  Constitutional:       Appearance: Normal appearance.   Cardiovascular:      Rate and Rhythm: Normal rate.   Pulmonary:      Effort: Pulmonary effort is normal.   Abdominal:      General: Abdomen is flat. There is no distension.      Palpations: Abdomen is soft.      Tenderness: There is no abdominal tenderness.       Skin:     General: Skin is warm and dry.   Neurological:      Mental Status: She is alert and oriented to person, place, and time.           Assessment/Plan  Assessment   Problem List Items Addressed This Visit          Gastrointestinal and Abdominal    Incarcerated ventral hernia - Primary    S/P repair of ventral hernia     Other Visit Diagnoses       Fistula, cholegastric                Yudy Lama is a 74 year old female status post exploratory laparotomy mesh explantation and ventral hernia repair on 2024, cholecystogastric fistula    On physical exam, midline incisions healing well, staples removed  Area where seroma was located softer, no evidence of active infection  Steri-Strips placed  at site of dehiscence, she can remove these in 7 days  Otherwise, patient is doing well, tolerating diet and having bowel function    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

## 2025-01-22 ENCOUNTER — OFFICE VISIT (OUTPATIENT)
Facility: LOCATION | Age: 75
End: 2025-01-22
Payer: MEDICARE

## 2025-01-22 VITALS
TEMPERATURE: 98 F | BODY MASS INDEX: 29.06 KG/M2 | DIASTOLIC BLOOD PRESSURE: 61 MMHG | OXYGEN SATURATION: 96 % | SYSTOLIC BLOOD PRESSURE: 105 MMHG | HEIGHT: 60 IN | HEART RATE: 71 BPM | WEIGHT: 148 LBS

## 2025-01-22 DIAGNOSIS — K31.6 GASTRIC FISTULA: Primary | ICD-10-CM

## 2025-01-22 PROCEDURE — 3008F BODY MASS INDEX DOCD: CPT | Performed by: STUDENT IN AN ORGANIZED HEALTH CARE EDUCATION/TRAINING PROGRAM

## 2025-01-22 PROCEDURE — 3074F SYST BP LT 130 MM HG: CPT | Performed by: STUDENT IN AN ORGANIZED HEALTH CARE EDUCATION/TRAINING PROGRAM

## 2025-01-22 PROCEDURE — 99212 OFFICE O/P EST SF 10 MIN: CPT | Performed by: STUDENT IN AN ORGANIZED HEALTH CARE EDUCATION/TRAINING PROGRAM

## 2025-01-22 PROCEDURE — 1159F MED LIST DOCD IN RCRD: CPT | Performed by: STUDENT IN AN ORGANIZED HEALTH CARE EDUCATION/TRAINING PROGRAM

## 2025-01-22 PROCEDURE — 3078F DIAST BP <80 MM HG: CPT | Performed by: STUDENT IN AN ORGANIZED HEALTH CARE EDUCATION/TRAINING PROGRAM

## 2025-01-22 RX ORDER — CHLORTHALIDONE 25 MG/1
TABLET ORAL
COMMUNITY
Start: 2025-01-09

## 2025-01-22 NOTE — PROGRESS NOTES
Patient ID: Yudy Lama is a 74 year old female.    Chief Complaint   Patient presents with    Follow - Up     EP - 3-mo f/u.  HERNIA VENTRAL REPAIR WITH BIOLOGICAL MESH, no symptoms         HPI: Yudy Lama is a 74 year old female presents to clinic for follow-up.  Patient is status post open ventral hernia repair for an incarcerated hernia on 2024.  Patient has been doing well from a surgical standpoint.  She has been tolerating diet and having normal bowel function.  Patient was also seen in hospital for gastrocholecystic fistula.  She denies any issues.    Workup: None      Past Medical History  Past Medical History:    Allergic rhinitis    Asthma (HCC)    Atherosclerosis of coronary artery    Description: mild, medical therapy.     Atrophic vaginitis    Bundle branch block    Calculus of gallbladder without cholecystitis without obstruction    Chronic venous insufficiency    Cyst of ovary    Diverticulosis of colon    Left side    Diverticulosis of intestine    Eczema    Esophageal reflux    Essential hypertension    Gastroesophageal reflux disease with esophagitis    High blood pressure    High cholesterol    History of stomach ulcers    Hx of colonic polyps    Hyperlipidemia    Hyperlipidemia, mixed    Hypertension, essential    Irritable bowel syndrome    KIDNEY STONE    Migraines    Mild intermittent asthma without complication (HCC)    Obesity    Primary generalized (osteo)arthritis    Sleep apnea    Ventral hernia without obstruction or gangrene    Repaired 2020       Past Surgical History  Past Surgical History:   Procedure Laterality Date    Appendectomy        1980    Colonoscopy  2015    Diverticulosis    D & c      Egd N/A 2021    Procedure: ESOPHAGOGASTRODUODENOSCOPY, COLONOSCOPY, POSSIBLE BIOPSY, POSSIBLE POLYPECTOMY 85516, 94796;  Surgeon: Sampson Damon MD;  Location: [facility]    Hernia surgery      Inguinal hernia repair   1968    Lithotripsy  2002      1976 and     Ventral hernia repair  2020    1.7 inch Ventralex ST hernia patch       Medications  Current Outpatient Medications   Medication Sig Dispense Refill    chlorthalidone 25 MG Oral Tab       lisinopril 10 MG Oral Tab Take 1 tablet (10 mg total) by mouth daily.      albuterol (2.5 MG/3ML) 0.083% Inhalation Nebu Soln Take 3 mL (2.5 mg total) by nebulization every 6 (six) hours as needed for Wheezing. 1 each 5    Respiratory Therapy Supplies (NEBULIZER) Does not apply Device One nebulizer 1 each 0    pantoprazole 40 MG Oral Tab EC Take 1 tablet (40 mg total) by mouth daily as needed. 90 tablet 1    rosuvastatin 5 MG Oral Tab Take 1 tablet (5 mg total) by mouth nightly. 90 tablet 1    fenofibrate 145 MG Oral Tab Take 1 tablet (145 mg total) by mouth once daily. 90 tablet 1    ADVAIR -21 MCG/ACT Inhalation Aerosol TAKE 2 PUFFS BY MOUTH TWICE A DAY **$492 3 each 2    Albuterol Sulfate HFA (VENTOLIN HFA) 108 (90 Base) MCG/ACT Inhalation Aero Soln Inhale 2 puffs into the lungs every 6 (six) hours as needed for Wheezing. 18 g 3    FLORASTOR 250 MG Oral Cap As directed 1 capsule 0    DICYCLOMINE HCL 10 MG Oral Cap TAKE 1 CAPSULE (10 MG TOTAL) BY MOUTH 4 (FOUR) TIMES DAILY BEFORE MEALS AND NIGHTLY. 120 capsule 2    Clobetasol Propionate 0.05 % External Ointment Apply twice daily to affected areas, reduce to using once, daily as the areas begin to resolve. 60 g 5    OYSCO 500 500 MG Oral Tab TAKE 1 TABLET BY MOUTH 3 TIMES A DAY 90 tablet 0    Magnesium Oxide 140 MG Oral Cap 500 MG PO Q DAY 28 capsule 0    silver sulfADIAZINE 1 % External Cream Apply 1 Application topically 3 (three) times daily. 50 g 0    aspirin 325 MG Oral Tab Take 1 tablet (325 mg total) by mouth daily.         Allergies  Allergies[1]    Social History  History   Smoking Status    Former    Types: Cigarettes   Smokeless Tobacco    Never     History   Alcohol Use    3.0 standard drinks of  alcohol/week    3 Standard drinks or equivalent per week     Comment: ocassionally     History   Drug Use Unknown       Family History  Family History   Problem Relation Age of Onset    Breast Cancer Father     Colon Cancer Father     Prostate Cancer Father     Heart Surgery Mother 63    Other (Other) Mother          at 75y/o;  AAA surgery.    Heart Disease Mother     Polyps Mother     Breast Cancer Maternal Grandmother 87        87    Heart Surgery Brother         Coronary stents in 40's; CABG in 50's    Other (Other) Brother         AAA repair    Heart Disease Brother        Review of Systems  Review of Systems   Constitutional: Negative.    Respiratory: Negative.     Cardiovascular: Negative.    Gastrointestinal:  Negative for abdominal distention, abdominal pain, constipation, nausea and vomiting.       Exam  Vitals:    25 1415   BP: 105/61   Pulse: 71   Temp: 97.7 °F (36.5 °C)     Physical Exam  Constitutional:       Appearance: Normal appearance.   Cardiovascular:      Rate and Rhythm: Normal rate.   Pulmonary:      Effort: Pulmonary effort is normal.   Abdominal:      General: Abdomen is flat. There is no distension.      Palpations: Abdomen is soft.      Tenderness: There is no abdominal tenderness.      Hernia: No hernia is present.       Skin:     General: Skin is warm and dry.   Neurological:      Mental Status: She is alert and oriented to person, place, and time.           Assessment/Plan  Assessment   Problem List Items Addressed This Visit          Gastrointestinal and Abdominal    Gastric fistula - Primary    Relevant Orders    CT ABDOMEN+PELVIS(CONTRAST ONLY)(CPT=74177)       Yudy Lama is a 74 year old female status post ventral hernia repair with biological mesh for incarcerated hernia and gastrocholecystic fistula    From the ventral hernia aspect, patient is doing well  She is tolerating diet and having normal bowel function  There is no recurrence on examination  She does have  a suture granuloma that can be removed at any time    In terms of patient's gastrocholecystic fistula, I believe patient would benefit from cholecystectomy  Patient has a second large gallstone that remains within the gallbladder lumen  However, she does not have typical gallbladder symptoms  Patient may need EGD to evaluate the lining of the stomach and duodenum see if site of fistula can be located  I do want to repeat CT imaging to reevaluate the anatomy  After obtaining more information, a discussion about the risks and benefits of the surgery is warranted  Patient will follow-up via telephone once imaging is done    If she has any questions or concerns, she can contact my office      Selin Conley MD  General Surgery  [facility]     CC:  Robert Kraus MD         [1] No Known Allergies

## 2025-02-24 ENCOUNTER — HOSPITAL ENCOUNTER (OUTPATIENT)
Dept: CT IMAGING | Age: 75
Discharge: HOME | End: 2025-02-24
Attending: STUDENT IN AN ORGANIZED HEALTH CARE EDUCATION/TRAINING PROGRAM
Payer: MEDICARE

## 2025-02-24 DIAGNOSIS — K31.6 GASTRIC FISTULA: ICD-10-CM

## 2025-02-24 PROCEDURE — 74177 CT ABD & PELVIS W/CONTRAST: CPT | Performed by: STUDENT IN AN ORGANIZED HEALTH CARE EDUCATION/TRAINING PROGRAM

## 2025-04-02 ENCOUNTER — OFFICE VISIT (OUTPATIENT)
Dept: SURGERY | Facility: CLINIC | Age: 75
End: 2025-04-02
Payer: MEDICARE

## 2025-04-02 VITALS
DIASTOLIC BLOOD PRESSURE: 60 MMHG | RESPIRATION RATE: 18 BRPM | HEART RATE: 65 BPM | TEMPERATURE: 97 F | OXYGEN SATURATION: 96 % | SYSTOLIC BLOOD PRESSURE: 108 MMHG

## 2025-04-02 DIAGNOSIS — K31.6 GASTRIC FISTULA: ICD-10-CM

## 2025-04-02 DIAGNOSIS — K43.9 VENTRAL HERNIA WITHOUT OBSTRUCTION OR GANGRENE: ICD-10-CM

## 2025-04-02 DIAGNOSIS — K80.20 CALCULUS OF GALLBLADDER WITHOUT CHOLECYSTITIS WITHOUT OBSTRUCTION: Primary | ICD-10-CM

## 2025-04-02 PROCEDURE — 3074F SYST BP LT 130 MM HG: CPT | Performed by: STUDENT IN AN ORGANIZED HEALTH CARE EDUCATION/TRAINING PROGRAM

## 2025-04-02 PROCEDURE — 1159F MED LIST DOCD IN RCRD: CPT | Performed by: STUDENT IN AN ORGANIZED HEALTH CARE EDUCATION/TRAINING PROGRAM

## 2025-04-02 PROCEDURE — 1160F RVW MEDS BY RX/DR IN RCRD: CPT | Performed by: STUDENT IN AN ORGANIZED HEALTH CARE EDUCATION/TRAINING PROGRAM

## 2025-04-02 PROCEDURE — 99213 OFFICE O/P EST LOW 20 MIN: CPT | Performed by: STUDENT IN AN ORGANIZED HEALTH CARE EDUCATION/TRAINING PROGRAM

## 2025-04-02 PROCEDURE — 1170F FXNL STATUS ASSESSED: CPT | Performed by: STUDENT IN AN ORGANIZED HEALTH CARE EDUCATION/TRAINING PROGRAM

## 2025-04-02 PROCEDURE — 3078F DIAST BP <80 MM HG: CPT | Performed by: STUDENT IN AN ORGANIZED HEALTH CARE EDUCATION/TRAINING PROGRAM

## 2025-04-02 NOTE — PROGRESS NOTES
Patient ID: Yudy Lama is a 75 year old female.    Chief Complaint   Patient presents with    [facility]     EP- Discuss Robotic Denn[facility] Surgery, CT Abd/Pel on 2/24, 9/21/24 HERNIA VENTRAL REPAIR WITH BIOLOGICAL  MESH , EXPLORATORY LAPAROTOMY,  OMENTECTOMY & APPENDECTOMY- states few lumps around the incision area        HPI: Yudy Lama is a 75 year old female presents to clinic for follow-up.  Since last clinic appointment, patient obtained CT scan.  She also reports some intermittent symptoms from her gallbladder including abdominal pain and nausea.  She follows a low-fat diet.  She also reports recurrence of her abdominal wall hernia.  She denies any discomfort at the site.  She denies any changes to her bowel habits.    Workup:   CT ABDOMEN+PELVIS(CONTRAST ONLY)(CPT=74177)    Result Date: 2/24/2025  CONCLUSION:  No extravasation of oral contrast.  No abnormal gas collection.  The gallbladder is difficult to evaluate in its contracted state with thick wall and sizable stone.  Along the fundal region there is either a Phrygian cap anatomic variation or a contained gallbladder perforation measuring 17 mm in a region which previously showed air.  Currently there is no air outside of the GI tract or free air.  The common bile duct is dilated.  However there is no intrahepatic biliary dilation.  Suggest  correlation with laboratory studies.   LOCATION:  [id number]   Dictated by (CST): Baron Mar MD on 2/24/2025 at 10:40 AM     Finalized by (CST): Baron Mar MD on 2/24/2025 at 10:52 AM          Past Medical History  Past Medical History:    Allergic rhinitis    Asthma (HCC)    Atherosclerosis of coronary artery    Description: mild, medical therapy.     Atrophic vaginitis    Bundle branch block    Calculus of gallbladder without cholecystitis without obstruction    Chronic venous insufficiency    Cyst of ovary    Diverticulosis of colon    Left side    Diverticulosis of intestine    Eczema     Esophageal reflux    Essential hypertension    Gastroesophageal reflux disease with esophagitis    High blood pressure    High cholesterol    History of stomach ulcers    Hx of colonic polyps    Hyperlipidemia    Hyperlipidemia, mixed    Hypertension, essential    Irritable bowel syndrome    KIDNEY STONE    Migraines    Mild intermittent asthma without complication (HCC)    Obesity    Primary generalized (osteo)arthritis    Sleep apnea    Ventral hernia without obstruction or gangrene    Repaired 2020       Past Surgical History  Past Surgical History:   Procedure Laterality Date    Appendectomy        1980    Colonoscopy  2015    Diverticulosis    D & c      Egd N/A 2021    Procedure: ESOPHAGOGASTRODUODENOSCOPY, COLONOSCOPY, POSSIBLE BIOPSY, POSSIBLE POLYPECTOMY 10508, 66462;  Surgeon: Sampson Damon MD;  Location: [facility]    Hernia surgery      Inguinal hernia repair  1968    Lithotripsy         and     Ventral hernia repair  2020    1.7 inch Ventralex ST hernia patch       Medications  Current Outpatient Medications   Medication Sig Dispense Refill    chlorthalidone 25 MG Oral Tab       lisinopril 10 MG Oral Tab Take 1 tablet (10 mg total) by mouth daily.      albuterol (2.5 MG/3ML) 0.083% Inhalation Nebu Soln Take 3 mL (2.5 mg total) by nebulization every 6 (six) hours as needed for Wheezing. 1 each 5    Respiratory Therapy Supplies (NEBULIZER) Does not apply Device One nebulizer 1 each 0    pantoprazole 40 MG Oral Tab EC Take 1 tablet (40 mg total) by mouth daily as needed. 90 tablet 1    rosuvastatin 5 MG Oral Tab Take 1 tablet (5 mg total) by mouth nightly. 90 tablet 1    fenofibrate 145 MG Oral Tab Take 1 tablet (145 mg total) by mouth once daily. 90 tablet 1    ADVAIR -21 MCG/ACT Inhalation Aerosol TAKE 2 PUFFS BY MOUTH TWICE A DAY **$492 3 each 2    Albuterol Sulfate HFA (VENTOLIN HFA) 108 (90 Base) MCG/ACT Inhalation Aero  Soln Inhale 2 puffs into the lungs every 6 (six) hours as needed for Wheezing. 18 g 3    FLORASTOR 250 MG Oral Cap As directed 1 capsule 0    DICYCLOMINE HCL 10 MG Oral Cap TAKE 1 CAPSULE (10 MG TOTAL) BY MOUTH 4 (FOUR) TIMES DAILY BEFORE MEALS AND NIGHTLY. 120 capsule 2    Clobetasol Propionate 0.05 % External Ointment Apply twice daily to affected areas, reduce to using once, daily as the areas begin to resolve. 60 g 5    OYSCO 500 500 MG Oral Tab TAKE 1 TABLET BY MOUTH 3 TIMES A DAY 90 tablet 0    Magnesium Oxide 140 MG Oral Cap 500 MG PO Q DAY 28 capsule 0    silver sulfADIAZINE 1 % External Cream Apply 1 Application topically 3 (three) times daily. 50 g 0    aspirin 325 MG Oral Tab Take 1 tablet (325 mg total) by mouth daily.         Allergies  Allergies[1]    Social History  History   Smoking Status    Former    Types: Cigarettes   Smokeless Tobacco    Never     History   Alcohol Use    3.0 standard drinks of alcohol/week    3 Standard drinks or equivalent per week     Comment: ocassionally     History   Drug Use Unknown       Family History  Family History   Problem Relation Age of Onset    Breast Cancer Father     Colon Cancer Father     Prostate Cancer Father     Heart Surgery Mother 63    Other (Other) Mother          at 77y/o;  AAA surgery.    Heart Disease Mother     Polyps Mother     Breast Cancer Maternal Grandmother 87        87    Heart Surgery Brother         Coronary stents in 40's; CABG in 50's    Other (Other) Brother         AAA repair    Heart Disease Brother        Review of Systems  Review of Systems   Constitutional: Negative.    Respiratory: Negative.     Cardiovascular: Negative.    Gastrointestinal:  Positive for nausea. Negative for abdominal distention, abdominal pain, constipation, diarrhea and vomiting.       Exam  Vitals:    25 1308   BP: 108/60   Pulse: 65   Resp: 18   Temp: 97.2 °F (36.2 °C)     Physical Exam  Constitutional:       Appearance: Normal appearance.    Cardiovascular:      Rate and Rhythm: Normal rate.   Pulmonary:      Effort: Pulmonary effort is normal.   Abdominal:      General: Abdomen is flat. There is no distension.      Palpations: Abdomen is soft.      Tenderness: There is no abdominal tenderness.      Hernia: A hernia is present.       Skin:     General: Skin is warm and dry.   Neurological:      Mental Status: She is alert and oriented to person, place, and time.           Assessment/Plan  Assessment   Problem List Items Addressed This Visit          Gastrointestinal and Abdominal    Calculus of gallbladder without cholecystitis without obstruction - Primary    Gastric fistula     Other Visit Diagnoses       Ventral hernia without obstruction or gangrene                Yudy Lama is a 75 year old female with cholegastric fistula and recurrent ventral hernia    CT images were reviewed personally by me and with the patient  CT shows oral contrast going through the stomach into the duodenum and small bowel without any extravasation into the gallbladder  Gallbladder is contracted and has a large stone  Common bile duct is also slightly dilated  Patient does endorse minimal symptoms from her gallbladder including intermittent abdominal pain and nausea  Patient may be a candidate for cholecystectomy  At this time, we will continue to observe    Patient also presents with recurrent ventral hernia  On her prior CT scan, potentially small recurrent hernia was identified  Patient reports recently being very active and has felt the bulge since  On physical exam, she has approximately 5 cm recurrent defect  Patient does not report significant discomfort  She would be a candidate for repair  But at this time, she wishes to continue to observe    Patient will follow-up with me in clinic in August  If she has any changes to her symptoms or any questions or concerns, she is to contact my office sooner      Selin Conley MD  General Surgery  [facility]  [facility]     CC:  Robert Kraus MD         [1] No Known Allergies

## 2025-08-20 ENCOUNTER — OFFICE VISIT (OUTPATIENT)
Dept: SURGERY | Facility: CLINIC | Age: 75
End: 2025-08-20

## 2025-08-20 VITALS
HEART RATE: 76 BPM | RESPIRATION RATE: 18 BRPM | SYSTOLIC BLOOD PRESSURE: 108 MMHG | OXYGEN SATURATION: 96 % | TEMPERATURE: 97 F | DIASTOLIC BLOOD PRESSURE: 58 MMHG

## 2025-08-20 DIAGNOSIS — K43.9 VENTRAL HERNIA WITHOUT OBSTRUCTION OR GANGRENE: Primary | ICD-10-CM

## (undated) DEVICE — PROXIMATE SKIN STAPLERS (35 WIDE) CONTAINS 35 STAINLESS STEEL STAPLES (FIXED HEAD): Brand: PROXIMATE

## (undated) DEVICE — LOADING UNIT WITH DST SERIES TECHNOLOGY: Brand: GIA

## (undated) DEVICE — STAPLER WITH DST SERIES TECHNOLOGY: Brand: GIA

## (undated) DEVICE — VIOLET BRAIDED (POLYGLACTIN 910), SYNTHETIC ABSORBABLE SUTURE: Brand: COATED VICRYL

## (undated) DEVICE — PACK PBDS LAPAROTOMY GENERAL

## (undated) DEVICE — GLOVE SUR 6.5 SENSICARE PI PIP CRM PWD F

## (undated) DEVICE — GLOVE SUR 6.5 SENSICARE PI PIP GRN PWD F

## (undated) DEVICE — SUT COAT VCRL 2-0 27IN SH ABSRB UD 26MM 1/2

## (undated) DEVICE — SLEEVE COMPR MD KNEE LEN SGL USE KENDALL SCD

## (undated) DEVICE — SOLUTION IRRIG 1000ML 0.9% NACL USP BTL

## (undated) DEVICE — DRAIN SURG W7MMXL20CM SIL 3/4 PERF FLAT

## (undated) DEVICE — 3M™ STERI-STRIP™ REINFORCED ADHESIVE SKIN CLOSURES, R1547, 1/2 IN X 4 IN (12 MM X 100 MM), 6 STRIPS/ENVELOPE: Brand: 3M™ STERI-STRIP™

## (undated) DEVICE — SUT PDS II 1 36IN ABSRB VLT L36MM CT-1

## (undated) DEVICE — COVER,LIGHT,CAMERA,HARD,1/PK,STRL: Brand: MEDLINE

## (undated) DEVICE — SUT ETHLN 2-0 18IN FS NABSRB BLK 26MM 3/8 CIR

## (undated) DEVICE — SUT MCRYL 4-0 18IN PS-2 ABSRB UD 19MM 3/8 CIR

## (undated) DEVICE — SUT COAT VCRL + 2-0 18IN ABSRB UD ANTIBACT

## (undated) DEVICE — EVACUATOR SUR 100CC SIL BLB WND

## (undated) NOTE — LETTER
[address]  [address]  Authorization for Surgical Operation and Procedure     Date:___________                                                                                                         Time:__________  I hereby authorize Surgeon(s):  eSlin Conley MD, my physician and his/her assistants (if applicable), which may include medical students, residents, and/or fellows, to perform the following surgical operation/ procedure and administer such anesthesia as may be determined necessary by my physician:  Operation/Procedure name (s) Procedure(s):  HERNIA VENTRAL REPAIR WITH MESH POSS. EXPLORATORY LAPAROTOMY POSS. BOWEL RESECTION on Yudy A Reading   2.   I recognize that during the surgical operation/procedure, unforeseen conditions may necessitate additional or different procedures than those listed above.  I, therefore, further authorize and request that the above-named surgeon, assistants, or designees perform such procedures as are, in their judgment, necessary and desirable.    3.   My surgeon/physician has discussed prior to my surgery the potential benefits, risks and side effects of this procedure; the likelihood of achieving goals; and potential problems that might occur during recuperation.  They also discussed reasonable alternatives to the procedure, including risks, benefits, and side effects related to the alternatives and risks related to not receiving this procedure.  I have had all my questions answered and I acknowledge that no guarantee has been made as to the result that may be obtained.    4.   Should the need arise during my operation/procedure, which includes change of level of care prior to discharge, I also consent to the administration of blood and/or blood products.  Further, I understand that despite careful testing and screening of blood or blood products by collecting agencies, I may still be subject to ill effects as a result of  receiving a blood transfusion and/or blood products.  The following are some, but not all, of the potential risks that can occur: fever and allergic reactions, hemolytic reactions, transmission of diseases such as Hepatitis, AIDS and Cytomegalovirus (CMV) and fluid overload.  In the event that I wish to have an autologous transfusion of my own blood, or a directed donor transfusion, I will discuss this with my physician.  Check only if Refusing Blood or Blood Products  I understand refusal of blood or blood products as deemed necessary by my physician may have serious consequences to my condition to include possible death. I hereby assume responsibility for my refusal and release the hospital, its personnel, and my physicians from any responsibility for the consequences of my refusal.          o  Refuse      5.   I authorize the use of any specimen, organs, tissues, body parts or foreign objects that may be removed from my body during the operation/procedure for diagnosis, research or teaching purposes and their subsequent disposal by hospital authorities.  I also authorize the release of specimen test results and/or written reports to my treating physician on the hospital medical staff or other referring or consulting physicians involved in my care, at the discretion of the Pathologist or my treating physician.    6.   I consent to the photographing or videotaping of the operations or procedures to be performed, including appropriate portions of my body for medical, scientific, or educational purposes, provided my identity is not revealed by the pictures or by descriptive texts accompanying them.  If the procedure has been photographed/videotaped, the surgeon will obtain the original picture, image, videotape or CD.  The hospital will not be responsible for storage, release or maintenance of the picture, image, tape or CD.    7.   I consent to the presence of a  or observers in the operating room  as deemed necessary by my physician or their designees.    8.   I recognize that in the event my procedure results in extended X-Ray/fluoroscopy time, I may develop a skin reaction.    9. If I have a Do Not Attempt Resuscitation (DNAR) order in place, that status will be suspended while in the operating room, procedural suite, and during the recovery period unless otherwise explicitly stated by me (or a person authorized to consent on my behalf). The surgeon or my attending physician will determine when the applicable recovery period ends for purposes of reinstating the DNAR order.  10. Patients having a sterilization procedure: I understand that if the procedure is successful the results will be permanent and it will therefore be impossible for me to inseminate, conceive, or bear children.  I also understand that the procedure is intended to result in sterility, although the result has not been guaranteed.   11. I acknowledge that my physician has explained sedation/analgesia administration to me including the risk and benefits I consent to the administration of sedation/analgesia as may be necessary or desirable in the judgment of my physician.    I CERTIFY THAT I HAVE READ AND FULLY UNDERSTAND THE ABOVE CONSENT TO OPERATION and/or OTHER PROCEDURE.    _________________________________________  __________________________________  Signature of Patient     Signature of Responsible Person         ___________________________________         Printed Name of Responsible Person           _________________________________                 Relationship to Patient  _________________________________________  ______________________________  Signature of Witness          Date  Time      Patient Name: Yudy MULLEN Kelby     : 1950                 Printed: 2024     Medical Record #: [id number]                     Page 1 of 2                                    [address],  [place]  [address]    Consent for Anesthesia    IYudy agree to be cared for by an anesthesiologist, who is specially trained to monitor me and give me medicine to put me to sleep or keep me comfortable during my procedure    I understand that my anesthesiologist is not an employee or agent of [facility] or [employer]. He or she works for [employer].    As the patient asking for anesthesia services, I agree to:  Allow the anesthesiologist (anesthesia doctor) to give me medicine and do additional procedures as necessary. Some examples are: Starting or using an “IV” to give me medicine, fluids or blood during my procedure, and having a breathing tube placed to help me breathe when I’m asleep (intubation). In the event that my heart stops working properly, I understand that my anesthesiologist will make every effort to sustain my life, unless otherwise directed by [facility] Do Not Resuscitate documents.  Tell my anesthesia doctor before my procedure:  If I am pregnant.  The last time that I ate or drank.  All of the medicines I take (including prescriptions, herbal supplements, and pills I can buy without a prescription (including street drugs/illegal medications). Failure to inform my anesthesiologist about these medicines may increase my risk of anesthetic complications.  If I am allergic to anything or have had a reaction to anesthesia before.  I understand how the anesthesia medicine will help me (benefits).  I understand that with any type of anesthesia medicine there are risks:  The most common risks are: nausea, vomiting, sore throat, muscle soreness, damage to my eyes, mouth, or teeth (from breathing tube placement).  Rare risks include: remembering what happened during my procedure, allergic reactions to medications, injury to my airway, heart, lungs, vision, nerves, or muscles and in extremely rare instances death.  My doctor has explained to me other choices  available to me for my care (alternatives).  Pregnant Patients (“epidural”):  I understand that the risks of having an epidural (medicine given into my back to help control pain during labor), include itching, low blood pressure, difficulty urinating, headache or slowing of the baby’s heart. Very rare risks include infection, bleeding, seizure, irregular heart rhythms and nerve injury.  Regional Anesthesia (“spinal”, “epidural”, & “nerve blocks”):  I understand that rare but potential complications include headache, bleeding, infection, seizure, irregular heart rhythms, and nerve injury.    I can change my mind about having anesthesia services at any time before I get the medicine.    _____________________________________________________________________________  Patient (or Representative) Signature/Relationship to Patient  Date   Time    _____________________________________________________________________________   Name (if used)    Language/Organization   Time    _____________________________________________________________________________  Anesthesiologist Signature     Date   Time  I have discussed the procedure and information above with the patient (or patient’s representative) and answered their questions. The patient or their representative has agreed to have anesthesia services.    _____________________________________________________________________________  Witness        Date   Time  I have verified that the signature is that of the patient or patient’s representative, and that it was signed before the procedure  Patient Name: Yudy MULLEN Kelby     : 1950                 Printed: 2024     Medical Record #: [id number]                     Page 2 of 2